# Patient Record
Sex: FEMALE | Race: WHITE | Employment: UNEMPLOYED | URBAN - METROPOLITAN AREA
[De-identification: names, ages, dates, MRNs, and addresses within clinical notes are randomized per-mention and may not be internally consistent; named-entity substitution may affect disease eponyms.]

---

## 2022-07-28 ENCOUNTER — APPOINTMENT (OUTPATIENT)
Dept: CT IMAGING | Age: 22
DRG: 134 | End: 2022-07-28
Payer: MEDICAID

## 2022-07-28 ENCOUNTER — HOSPITAL ENCOUNTER (INPATIENT)
Age: 22
LOS: 1 days | Discharge: HOME | DRG: 134 | End: 2022-07-28
Attending: EMERGENCY MEDICINE | Admitting: INTERNAL MEDICINE
Payer: MEDICAID

## 2022-07-28 ENCOUNTER — APPOINTMENT (OUTPATIENT)
Dept: GENERAL RADIOLOGY | Age: 22
DRG: 134 | End: 2022-07-28
Payer: MEDICAID

## 2022-07-28 VITALS
HEIGHT: 64 IN | HEART RATE: 102 BPM | OXYGEN SATURATION: 96 % | TEMPERATURE: 97.4 F | SYSTOLIC BLOOD PRESSURE: 114 MMHG | RESPIRATION RATE: 14 BRPM | DIASTOLIC BLOOD PRESSURE: 77 MMHG | WEIGHT: 136 LBS | BODY MASS INDEX: 23.22 KG/M2

## 2022-07-28 DIAGNOSIS — I26.99 ACUTE PULMONARY EMBOLISM, UNSPECIFIED PULMONARY EMBOLISM TYPE, UNSPECIFIED WHETHER ACUTE COR PULMONALE PRESENT (HCC): Primary | ICD-10-CM

## 2022-07-28 LAB
ABSOLUTE EOS #: 1.87 K/UL (ref 0–0.44)
ABSOLUTE IMMATURE GRANULOCYTE: 0.09 K/UL (ref 0–0.3)
ABSOLUTE LYMPH #: 2.38 K/UL (ref 1.2–5.2)
ABSOLUTE MONO #: 0.78 K/UL (ref 0.1–1.4)
ALBUMIN SERPL-MCNC: 3.9 G/DL (ref 3.5–5.2)
ALBUMIN/GLOBULIN RATIO: 1 (ref 1–2.5)
ALP BLD-CCNC: 93 U/L (ref 35–104)
ALT SERPL-CCNC: 18 U/L (ref 5–33)
ANION GAP SERPL CALCULATED.3IONS-SCNC: 8 MMOL/L (ref 9–17)
AST SERPL-CCNC: 22 U/L
BASOPHILS # BLD: 1 % (ref 0–2)
BASOPHILS ABSOLUTE: 0.07 K/UL (ref 0–0.2)
BILIRUB SERPL-MCNC: 0.2 MG/DL (ref 0.3–1.2)
BILIRUBIN URINE: NEGATIVE
BUN BLDV-MCNC: 11 MG/DL (ref 6–20)
CALCIUM SERPL-MCNC: 11 MG/DL (ref 8.6–10.4)
CHLORIDE BLD-SCNC: 102 MMOL/L (ref 98–107)
CO2: 30 MMOL/L (ref 20–31)
COLOR: YELLOW
COMMENT UA: NORMAL
CREAT SERPL-MCNC: 0.65 MG/DL (ref 0.5–0.9)
D-DIMER QUANTITATIVE: 0.51 MG/L FEU
EOSINOPHILS RELATIVE PERCENT: 13 % (ref 1–4)
GFR NON-AFRICAN AMERICAN: ABNORMAL ML/MIN
GFR SERPL CREATININE-BSD FRML MDRD: ABNORMAL ML/MIN/{1.73_M2}
GLUCOSE BLD-MCNC: 99 MG/DL (ref 70–99)
GLUCOSE URINE: NEGATIVE
HCG QUALITATIVE: NEGATIVE
HCT VFR BLD CALC: 38.3 % (ref 36.3–47.1)
HEMOGLOBIN: 11.9 G/DL (ref 11.9–15.1)
IMMATURE GRANULOCYTES: 1 %
KETONES, URINE: NEGATIVE
LEUKOCYTE ESTERASE, URINE: NEGATIVE
LIPASE: 18 U/L (ref 13–60)
LYMPHOCYTES # BLD: 17 % (ref 25–45)
MAGNESIUM: 1.9 MG/DL (ref 1.7–2.2)
MCH RBC QN AUTO: 26.7 PG (ref 25.2–33.5)
MCHC RBC AUTO-ENTMCNC: 31.1 G/DL (ref 28.4–34.8)
MCV RBC AUTO: 86.1 FL (ref 82.6–102.9)
MONOCYTES # BLD: 6 % (ref 2–8)
NITRITE, URINE: NEGATIVE
NRBC AUTOMATED: 0 PER 100 WBC
PDW BLD-RTO: 12.9 % (ref 11.8–14.4)
PH UA: 6.5 (ref 5–8)
PLATELET # BLD: 383 K/UL (ref 138–453)
PMV BLD AUTO: 9.3 FL (ref 8.1–13.5)
POTASSIUM SERPL-SCNC: 4 MMOL/L (ref 3.7–5.3)
PROTEIN UA: NEGATIVE
RBC # BLD: 4.45 M/UL (ref 3.95–5.11)
SEG NEUTROPHILS: 62 % (ref 34–64)
SEGMENTED NEUTROPHILS ABSOLUTE COUNT: 8.88 K/UL (ref 1.8–8)
SODIUM BLD-SCNC: 140 MMOL/L (ref 135–144)
SPECIFIC GRAVITY UA: 1.03 (ref 1–1.03)
TOTAL PROTEIN: 7.8 G/DL (ref 6.4–8.3)
TURBIDITY: CLEAR
URINE HGB: NEGATIVE
UROBILINOGEN, URINE: NORMAL
WBC # BLD: 14.1 K/UL (ref 4.5–13.5)

## 2022-07-28 PROCEDURE — 71260 CT THORAX DX C+: CPT | Performed by: STUDENT IN AN ORGANIZED HEALTH CARE EDUCATION/TRAINING PROGRAM

## 2022-07-28 PROCEDURE — 84703 CHORIONIC GONADOTROPIN ASSAY: CPT

## 2022-07-28 PROCEDURE — 6370000000 HC RX 637 (ALT 250 FOR IP): Performed by: STUDENT IN AN ORGANIZED HEALTH CARE EDUCATION/TRAINING PROGRAM

## 2022-07-28 PROCEDURE — 81003 URINALYSIS AUTO W/O SCOPE: CPT

## 2022-07-28 PROCEDURE — 99285 EMERGENCY DEPT VISIT HI MDM: CPT

## 2022-07-28 PROCEDURE — 83735 ASSAY OF MAGNESIUM: CPT

## 2022-07-28 PROCEDURE — 85025 COMPLETE CBC W/AUTO DIFF WBC: CPT

## 2022-07-28 PROCEDURE — 6360000004 HC RX CONTRAST MEDICATION: Performed by: STUDENT IN AN ORGANIZED HEALTH CARE EDUCATION/TRAINING PROGRAM

## 2022-07-28 PROCEDURE — 93005 ELECTROCARDIOGRAM TRACING: CPT | Performed by: STUDENT IN AN ORGANIZED HEALTH CARE EDUCATION/TRAINING PROGRAM

## 2022-07-28 PROCEDURE — 1200000000 HC SEMI PRIVATE

## 2022-07-28 PROCEDURE — 83690 ASSAY OF LIPASE: CPT

## 2022-07-28 PROCEDURE — 85379 FIBRIN DEGRADATION QUANT: CPT

## 2022-07-28 PROCEDURE — 71045 X-RAY EXAM CHEST 1 VIEW: CPT

## 2022-07-28 PROCEDURE — 2580000003 HC RX 258: Performed by: STUDENT IN AN ORGANIZED HEALTH CARE EDUCATION/TRAINING PROGRAM

## 2022-07-28 PROCEDURE — 80053 COMPREHEN METABOLIC PANEL: CPT

## 2022-07-28 RX ORDER — SODIUM CHLORIDE 0.9 % (FLUSH) 0.9 %
5-40 SYRINGE (ML) INJECTION PRN
Status: CANCELLED | OUTPATIENT
Start: 2022-07-28

## 2022-07-28 RX ORDER — SODIUM CHLORIDE 0.9 % (FLUSH) 0.9 %
5-40 SYRINGE (ML) INJECTION EVERY 12 HOURS SCHEDULED
Status: CANCELLED | OUTPATIENT
Start: 2022-07-28

## 2022-07-28 RX ORDER — POLYETHYLENE GLYCOL 3350 17 G/17G
17 POWDER, FOR SOLUTION ORAL DAILY PRN
Status: CANCELLED | OUTPATIENT
Start: 2022-07-28

## 2022-07-28 RX ORDER — ONDANSETRON 2 MG/ML
4 INJECTION INTRAMUSCULAR; INTRAVENOUS EVERY 6 HOURS PRN
Status: CANCELLED | OUTPATIENT
Start: 2022-07-28

## 2022-07-28 RX ORDER — ONDANSETRON 4 MG/1
4 TABLET, ORALLY DISINTEGRATING ORAL EVERY 8 HOURS PRN
Status: CANCELLED | OUTPATIENT
Start: 2022-07-28

## 2022-07-28 RX ORDER — ONDANSETRON 2 MG/ML
4 INJECTION INTRAMUSCULAR; INTRAVENOUS ONCE
Status: DISCONTINUED | OUTPATIENT
Start: 2022-07-28 | End: 2022-07-28 | Stop reason: HOSPADM

## 2022-07-28 RX ORDER — ACETAMINOPHEN 650 MG/1
650 SUPPOSITORY RECTAL EVERY 6 HOURS PRN
Status: CANCELLED | OUTPATIENT
Start: 2022-07-28

## 2022-07-28 RX ORDER — ACETAMINOPHEN 325 MG/1
650 TABLET ORAL EVERY 6 HOURS PRN
Status: CANCELLED | OUTPATIENT
Start: 2022-07-28

## 2022-07-28 RX ORDER — 0.9 % SODIUM CHLORIDE 0.9 %
1000 INTRAVENOUS SOLUTION INTRAVENOUS ONCE
Status: COMPLETED | OUTPATIENT
Start: 2022-07-28 | End: 2022-07-28

## 2022-07-28 RX ORDER — SODIUM CHLORIDE 9 MG/ML
INJECTION, SOLUTION INTRAVENOUS PRN
Status: CANCELLED | OUTPATIENT
Start: 2022-07-28

## 2022-07-28 RX ADMIN — APIXABAN 5 MG: 5 TABLET, FILM COATED ORAL at 18:13

## 2022-07-28 RX ADMIN — SODIUM CHLORIDE 1000 ML: 9 INJECTION, SOLUTION INTRAVENOUS at 14:47

## 2022-07-28 RX ADMIN — IOPAMIDOL 75 ML: 755 INJECTION, SOLUTION INTRAVENOUS at 15:26

## 2022-07-28 ASSESSMENT — ENCOUNTER SYMPTOMS
CHOKING: 0
VOMITING: 1
ABDOMINAL PAIN: 1
COUGH: 0
SHORTNESS OF BREATH: 1
NAUSEA: 0
BACK PAIN: 0

## 2022-07-28 NOTE — ED NOTES
Nurse confirmed with Dr Arvind Esqueda that patient is to be discharged and she confirmed yes that she perfect served the team and informed them. Nurse to room to inform patient of change and give discharge papers and first dose of elaquis. Patient given RX and assistance card for elaquis. IV removed. No questions at this time.      Zari Knapp LPN  19/37/83 2026

## 2022-07-28 NOTE — ED NOTES
Patient in need of a ride home from the ER. Patient is new to the area and is staying at [facility]. Patient unsure how to get back there. Patient has only [place] Medicaid in place and has not signed up for [employer] [facility] yet, instructions provided to do that. Patient told SW would set up a one-time cab trip for patient to get back to the shelter. [employer] arranged. Celestine Leal.  Edin Reynolds, [place]  07/28/22 2000

## 2022-07-28 NOTE — ED NOTES
Resident asked for Eliquis assistance. SW informed resident that she has [facility] and that they should be able to cover it. She then asked for any other assistance in case they did not. Resident was provided with the free 30 day trial offer for Eliquis.        Deep LANDRY, [address] [place] [address]  07/28/22 4660

## 2022-07-28 NOTE — DISCHARGE INSTRUCTIONS
You will be started on Eliquis because of your pulmonary embolus. Make sure he follow-up with vascular surgeon due to pulmonary embolism. If it anytime you have shortness of breath please make sure him back to the emergency room as soon as possible. Please make sure you take your medications as prescribed.     CT CHEST PULMONARY EMBOLISM W CONTRAST   Final Result   Couple of small nonocclusive intraluminal filling defects involving the   subsegmental branches to the left lower lobe arteries suggesting small   pulmonary emboli      Critical results were attempted and called by Dr. Samira Redd MD   however connection could not be made therefore results were given to  the   Radiology Results [address] and conveyed to Patti Monroy MD   on 7/28/2022 at 16:16,         XR CHEST PORTABLE   Final Result   No acute cardiopulmonary findings

## 2022-07-28 NOTE — ED PROVIDER NOTES
HPI, Physical Exam and Medical Decision Making performed by medical students or scribes is based on my personal performance of the HPI, PE and MDM. For Phys Assistant/ Nurse Practitioner cases/documentation I have had a face to face evaluation of this patient and have completed at least one if not all key elements of the E/M (history, physical exam, and MDM). Additional findings are as noted. For APC cases I have personally evaluated and examined the patient in conjunction with the APC and agree with the treatment plan and disposition of the patient as recorded by the APC.     Ceasar Saldaña MD  Attending Emergency  Physician       Guanako Martin MD  07/28/22 4836       Guanako Martin MD  07/28/22 2011

## 2022-07-28 NOTE — ED PROVIDER NOTES
[facility]  ED  Emergency Department Encounter  Emergency Medicine Resident     Pt Name: Court Orlando  MRN: [id number]  [place] 2000  Date of evaluation: 7/28/22  PCP:  No primary care provider on file. CHIEF COMPLAINT       Chief Complaint   Patient presents with    Chest Pain    Other     Possible pregnancy? ?    Stye     R eye       HISTORY OFPRESENT ILLNESS  (Location/Symptom, Timing/Onset, Context/Setting, Quality, Duration, Modifying Factors,Severity.)      Court Orlando is a 25 y. o.yo female who presents with sudden onset chest pain that started when she woke up this morning. Patient states that there is mildly worsened with inspiration. Patient went to urgent care today and was found to be tachycardic and thus was sent to the emergency room for evaluation. Patient reports that she is never experienced any pain like this before, she states that she is not aware if she is on any OCP, no recent long distance traveling, no recent surgery. Patient denies any fever, chills. Patient also reports that she took multiple pregnancy test, 1 came back positive but the other Dorsie Areola came back negative and unsure if she is pregnant. She is complaining of epigastric pain with mild nausea. Denies any vaginal discharge, vaginal bleeding, urinary dysuria and frequency. PAST MEDICAL / SURGICAL / SOCIAL / FAMILY HISTORY      has no past medical history on file. has no past surgical history on file.      Social History     Socioeconomic History    Marital status: Single     Spouse name: Not on file    Number of children: Not on file    Years of education: Not on file    Highest education level: Not on file   Occupational History    Not on file   Tobacco Use    Smoking status: Never    Smokeless tobacco: Never   Substance and Sexual Activity    Alcohol use: Not Currently    Drug use: Not Currently    Sexual activity: Not on file   Other Topics Concern    Not on file   Social History Narrative Not on file     Social Determinants of Health     Financial Resource Strain: Not on file   Food Insecurity: Not on file   Transportation Needs: Not on file   Physical Activity: Not on file   Stress: Not on file   Social Connections: Not on file   Intimate Partner Violence: Not on file   Housing Stability: Not on file       History reviewed. No pertinent family history. Allergies:  Penicillins    Home Medications:  Prior to Admission medications    Medication Sig Start Date End Date Taking? Authorizing Provider   apixaban starter pack (ELIQUIS DVT/PE STARTER PACK) 5 MG TBPK tablet Take 1 tablet by mouth See Admin Instructions 7/28/22  Yes Tj Vizcaino MD       REVIEW OFSYSTEMS    (2-9 systems for level 4, 10 or more for level 5)      Review of Systems   Constitutional:  Positive for chills. Negative for fatigue and fever. HENT:  Negative for congestion. Respiratory:  Positive for shortness of breath. Negative for cough and choking. Cardiovascular:  Positive for chest pain. Gastrointestinal:  Positive for abdominal pain and vomiting. Negative for nausea. Genitourinary:  Negative for decreased urine volume, dysuria, frequency, hematuria, vaginal bleeding, vaginal discharge and vaginal pain. Musculoskeletal:  Negative for back pain and gait problem. Skin:  Negative for rash and wound. Psychiatric/Behavioral:  Negative for behavioral problems and confusion. PHYSICAL EXAM   (up to 7 for level 4, 8 or more forlevel 5)      INITIAL VITALS:   ED Triage Vitals [07/28/22 1227]   BP Temp Temp Source Heart Rate Resp SpO2 Height Weight   124/78 97.9 °F (36.6 °C) Oral (!) 112 12 96 % 5' 4\" (1.626 m) 136 lb (61.7 kg)       Physical Exam  Constitutional:       Appearance: Normal appearance. HENT:      Head: Normocephalic and atraumatic. Nose: Nose normal.      Mouth/Throat:      Mouth: Mucous membranes are moist.   Eyes:      Extraocular Movements: Extraocular movements intact. Pupils: Pupils are equal, round, and reactive to light. Cardiovascular:      Rate and Rhythm: Tachycardia present. Pulses: Normal pulses. Heart sounds: No murmur heard. Pulmonary:      Effort: Pulmonary effort is normal. No respiratory distress. Abdominal:      General: There is no distension. Tenderness: There is no abdominal tenderness. Musculoskeletal:         General: No swelling or tenderness. Cervical back: Normal range of motion. No rigidity. Skin:     General: Skin is warm. Coloration: Skin is not jaundiced. Neurological:      General: No focal deficit present. Mental Status: She is alert. Psychiatric:         Mood and Affect: Mood normal.       DIFFERENTIAL  DIAGNOSIS     PLAN (LABS / IMAGING / EKG):  Orders Placed This Encounter   Procedures    XR CHEST PORTABLE    CT CHEST PULMONARY EMBOLISM W CONTRAST    Comprehensive Metabolic Panel    Lipase    Magnesium    CBC with Auto Differential    HCG Qualitative, Serum    D-Dimer, Quantitative    Urinalysis with Reflex to Culture    Cardiac Monitor    Pulse Oximetry    Inpatient consult to Vascular Surgery    Inpatient consult to Internal Medicine    EKG 12 Lead    Saline lock IV    ADMIT TO INPATIENT       MEDICATIONS ORDERED:  Orders Placed This Encounter   Medications    0.9 % sodium chloride bolus    DISCONTD: ondansetron (ZOFRAN) injection 4 mg    iopamidol (ISOVUE-370) 76 % injection 75 mL    apixaban starter pack (ELIQUIS DVT/PE STARTER PACK) 5 MG TBPK tablet     Sig: Take 1 tablet by mouth See Admin Instructions     Dispense:  74 tablet     Refill:  0    apixaban (ELIQUIS) tablet 5 mg     Order Specific Question:   Indication of Use     Answer:   Treatment-DVT/PE         Initial MDM/Plan: 25 y.o. female who presents with chest pain and abdominal pain. Patient tachycardic in the emergency room of 123. Not hypoxic, stable vitals.   EKG done did show S1Q3 pattern but no T3 pattern, she is tachycardic of Condition (MA) Reason for Exam: chest pain with elevated d dimer FINDINGS: Pulmonary Arteries: Pulmonary arteries are adequately opacified for evaluation. There are a couple of small nonocclusive intraluminal filling defects involving the subsegmental branches to the left lower lobe. No other significant intraluminal filling defects are seen. Main pulmonary artery is normal in caliber. Mediastinum: No evidence of mediastinal lymphadenopathy. The heart and pericardium demonstrate no acute abnormality. There is no acute abnormality of the thoracic aorta. Lungs/pleura: The lungs are without acute process. No focal consolidation or pulmonary edema. No evidence of pleural effusion or pneumothorax. Upper Abdomen: Limited images of the upper abdomen are unremarkable. Soft Tissues/Bones: No acute bone or soft tissue abnormality.      Couple of small nonocclusive intraluminal filling defects involving the subsegmental branches to the left lower lobe arteries suggesting small pulmonary emboli Critical results were attempted and called by Dr. Jason Harris MD however connection could not be made therefore results were given to  the Radiology Results [address] and conveyed to Patti Monroy MD on 7/28/2022 at 16:16,        EKG  EKG Interpretation    Interpreted by me    Rhythm: normal sinus   Rate: normal  Axis: normal  Ectopy: none  Conduction: normal  ST Segments: no acute change  T Waves: no acute change  Q Waves: none    Clinical Impression: Presence of S1, Q3 no T3    All EKG's are interpreted by the Emergency Department Physicianwho either signs or Co-signs this chart in the absence of a cardiologist.    EMERGENCY DEPARTMENT COURSE:  ED Course as of 07/28/22 2130   Thu Jul 28, 2022   1544 Elevation her dimer was 0.51, will get a CT chest to rule out pulmonary embolism [AN]   1727 Vascular surgery evaluated due to the small pulmonary embolism seen on CT scan, they recommended outpatient treatment with Eliquis or Xarelto. Plan to start patient on Eliquis. Plan for discharge. Patient to follow-up with Dr. Mahsa Jones as outpatient. Patient was updated on results and the plan [AN]      ED Course User Index  [AN] Sam Pate MD          PROCEDURES:  None    CONSULTS:  IP CONSULT TO VASCULAR SURGERY  IP CONSULT TO INTERNAL MEDICINE    CRITICAL CARE:      FINAL IMPRESSION      1.  Acute pulmonary embolism, unspecified pulmonary embolism type, unspecified whether acute cor pulmonale present [facility])          DISPOSITION / PLAN     DISPOSITION Decision To Discharge 07/28/2022 05:54:30 PM      PATIENT REFERRED TO:  [facility] ED  [address]        Saman Giang MD  [facility] [address]  [phone]    In 4 weeks  F/U for Pulmonary Embolism    DISCHARGE MEDICATIONS:  Discharge Medication List as of 7/28/2022  6:00 PM        START taking these medications    Details   apixaban starter pack (ELIQUIS DVT/PE STARTER PACK) 5 MG TBPK tablet Take 1 tablet by mouth See Admin Instructions, Disp-74 tablet, R-0Print             Sam Pate MD  Emergency Medicine Resident    (Please note that portions of this note were completed with a voice recognition program.Efforts were made to edit the dictations but occasionally words are mis-transcribed.)        Sam Pate MD  Resident  07/28/22 6396

## 2022-07-28 NOTE — CONSULTS
bruising  Endocrine ROS: negative for - malaise/lethargy, mood swings or palpitations  Respiratory ROS: negative for - cough, hemoptysis or shortness of breath  Cardiovascular ROS: Positive for chest pain with deep breaths  Gastrointestinal ROS: no abdominal pain, constipation, hematochezia  Genito-Urinary ROS: no dysuria, trouble voiding, or hematuria  Musculoskeletal ROS: negative for - joint pain, joint swelling or muscle pain  Neurological ROS:  TIA or stroke like symptom  Dermatological ROS: negative for dry skin and eczema      PHYSICAL EXAM:    VITALS:  /77   Pulse (!) 102   Temp 97.4 °F (36.3 °C)   Resp 14   Ht 5' 4\" (1.626 m)   Wt 136 lb (61.7 kg)   SpO2 96%   BMI 23.34 kg/m²   INTAKE/OUTPUT:   No intake or output data in the 24 hours ending 07/28/22 1723      CONSTITUTIONAL:  Alert and oriented, no acute distress  HEAD: normocephalic, atraumatic  EYES: Pupils equal and reactive to light, Extraocular muscles intact, sclera non icteric  ENT: Mucus membranes moist, No otorrhea, no rhinorrhea  NECK:  supple, symmetrical, trachea midline   LUNGS:  Good air movement bilaterally, unlabored respirations, on room air  CARDIOVASCULAR: Regular rate and rhythm  ABDOMEN: soft, non tender, non distended  : Deferred  Rectal:  Deferred  MUSCULOSKELETAL:  Equal strength bilaterally, normal muscle tone, mild tenderness to right calf  SKIN: No abscess or rash, punctate lesions appeared to look like bug bites on bilateral lower extremities  Ext: Palpable PT/DP pulses, no significant swelling to bilateral lower extremities  NEUROLOGIC:  Cranial nerves 2-12 grossly intact, no focal deficits  PSYCH: affect appropriate      Labs:   Lab Results   Component Value Date/Time    WBC 14.1 07/28/2022 01:23 PM    HGB 11.9 07/28/2022 01:23 PM    HCT 38.3 07/28/2022 01:23 PM    MCV 86.1 07/28/2022 01:23 PM     07/28/2022 01:23 PM     Lab Results   Component Value Date/Time     07/28/2022 01:23 PM    K 4.0

## 2022-07-29 LAB
EKG ATRIAL RATE: 110 BPM
EKG P AXIS: 61 DEGREES
EKG P-R INTERVAL: 140 MS
EKG Q-T INTERVAL: 316 MS
EKG QRS DURATION: 70 MS
EKG QTC CALCULATION (BAZETT): 427 MS
EKG R AXIS: 78 DEGREES
EKG T AXIS: 49 DEGREES
EKG VENTRICULAR RATE: 110 BPM

## 2022-07-29 PROCEDURE — 93010 ELECTROCARDIOGRAM REPORT: CPT | Performed by: INTERNAL MEDICINE

## 2022-08-09 ENCOUNTER — HOSPITAL ENCOUNTER (EMERGENCY)
Age: 22
Discharge: HOME | End: 2022-08-10
Attending: EMERGENCY MEDICINE

## 2022-08-09 DIAGNOSIS — R07.9 CHEST PAIN, UNSPECIFIED TYPE: Primary | ICD-10-CM

## 2022-08-09 PROCEDURE — 36415 COLL VENOUS BLD VENIPUNCTURE: CPT

## 2022-08-09 PROCEDURE — 93005 ELECTROCARDIOGRAM TRACING: CPT | Performed by: EMERGENCY MEDICINE

## 2022-08-09 PROCEDURE — 99285 EMERGENCY DEPT VISIT HI MDM: CPT

## 2022-08-10 ENCOUNTER — APPOINTMENT (OUTPATIENT)
Dept: GENERAL RADIOLOGY | Age: 22
End: 2022-08-10
Attending: EMERGENCY MEDICINE

## 2022-08-10 VITALS
WEIGHT: 136.02 LBS | DIASTOLIC BLOOD PRESSURE: 75 MMHG | OXYGEN SATURATION: 97 % | RESPIRATION RATE: 16 BRPM | TEMPERATURE: 98.3 F | SYSTOLIC BLOOD PRESSURE: 121 MMHG | HEART RATE: 98 BPM

## 2022-08-10 LAB
ALBUMIN SERPL-MCNC: 3.1 G/DL (ref 3.6–5.1)
ALBUMIN/GLOB SERPL: 0.7 {RATIO} (ref 1–2.4)
ALP SERPL-CCNC: 72 UNITS/L (ref 42–110)
ALT SERPL-CCNC: 29 UNITS/L
ANION GAP SERPL CALC-SCNC: 12 MMOL/L (ref 7–19)
AST SERPL-CCNC: 25 UNITS/L
BASOPHILS # BLD: 0 K/MCL (ref 0–0.3)
BASOPHILS NFR BLD: 0 %
BILIRUB SERPL-MCNC: 0.1 MG/DL (ref 0.2–1)
BUN SERPL-MCNC: 14 MG/DL (ref 6–20)
BUN/CREAT SERPL: 23 (ref 7–25)
CALCIUM SERPL-MCNC: 10.2 MG/DL (ref 8.4–10.2)
CHLORIDE SERPL-SCNC: 103 MMOL/L (ref 97–110)
CO2 SERPL-SCNC: 27 MMOL/L (ref 21–32)
CREAT SERPL-MCNC: 0.62 MG/DL (ref 0.51–0.95)
DEPRECATED RDW RBC: 39.5 FL (ref 39–50)
EOSINOPHIL # BLD: 1.4 K/MCL (ref 0–0.5)
EOSINOPHIL NFR BLD: 11 %
ERYTHROCYTE [DISTWIDTH] IN BLOOD: 13.2 % (ref 11–15)
FASTING DURATION TIME PATIENT: ABNORMAL H
GFR SERPLBLD BASED ON 1.73 SQ M-ARVRAT: >90 ML/MIN
GLOBULIN SER-MCNC: 4.6 G/DL (ref 2–4)
GLUCOSE SERPL-MCNC: 117 MG/DL (ref 70–99)
HCT VFR BLD CALC: 32.7 % (ref 36–46.5)
HGB BLD-MCNC: 10.4 G/DL (ref 12–15.5)
IMM GRANULOCYTES # BLD AUTO: 0.1 K/MCL (ref 0–0.2)
IMM GRANULOCYTES # BLD: 1 %
LIPASE SERPL-CCNC: 93 UNITS/L (ref 73–393)
LYMPHOCYTES # BLD: 3.1 K/MCL (ref 1.2–5.2)
LYMPHOCYTES NFR BLD: 24 %
MCH RBC QN AUTO: 26.5 PG (ref 26–34)
MCHC RBC AUTO-ENTMCNC: 31.8 G/DL (ref 32–36.5)
MCV RBC AUTO: 83.4 FL (ref 78–100)
MONOCYTES # BLD: 0.9 K/MCL (ref 0.3–0.9)
MONOCYTES NFR BLD: 7 %
NEUTROPHILS # BLD: 7.6 K/MCL (ref 1.8–8)
NEUTROPHILS NFR BLD: 57 %
NRBC BLD MANUAL-RTO: 0 /100 WBC
P AXIS (DEGREES): 55
P AXIS (DEGREES): 59
PLATELET # BLD AUTO: 376 K/MCL (ref 140–450)
POTASSIUM SERPL-SCNC: 4 MMOL/L (ref 3.4–5.1)
PR-INTERVAL (MSEC): 163
PR-INTERVAL (MSEC): 164
PROT SERPL-MCNC: 7.7 G/DL (ref 6.4–8.2)
QRS-INTERVAL (MSEC): 77
QRS-INTERVAL (MSEC): 77
QT-INTERVAL (MSEC): 338
QT-INTERVAL (MSEC): 341
QTC: 374
QTC: 380
R AXIS (DEGREES): 69
R AXIS (DEGREES): 70
RBC # BLD: 3.92 MIL/MCL (ref 4–5.2)
REPORT TEXT: NORMAL
REPORT TEXT: NORMAL
SODIUM SERPL-SCNC: 138 MMOL/L (ref 135–145)
T AXIS (DEGREES): 35
T AXIS (DEGREES): 35
TROPONIN I SERPL DL<=0.01 NG/ML-MCNC: <4 NG/L
VENTRICULAR RATE EKG/MIN (BPM): 80
VENTRICULAR RATE EKG/MIN (BPM): 83
WBC # BLD: 13.1 K/MCL (ref 4.2–11)

## 2022-08-10 PROCEDURE — 83690 ASSAY OF LIPASE: CPT | Performed by: EMERGENCY MEDICINE

## 2022-08-10 PROCEDURE — 84484 ASSAY OF TROPONIN QUANT: CPT | Performed by: EMERGENCY MEDICINE

## 2022-08-10 PROCEDURE — 80053 COMPREHEN METABOLIC PANEL: CPT | Performed by: EMERGENCY MEDICINE

## 2022-08-10 PROCEDURE — 85025 COMPLETE CBC W/AUTO DIFF WBC: CPT | Performed by: EMERGENCY MEDICINE

## 2022-08-10 PROCEDURE — 93005 ELECTROCARDIOGRAM TRACING: CPT | Performed by: EMERGENCY MEDICINE

## 2022-08-10 PROCEDURE — 71045 X-RAY EXAM CHEST 1 VIEW: CPT

## 2022-08-10 RX ORDER — 0.9 % SODIUM CHLORIDE 0.9 %
2 VIAL (ML) INJECTION EVERY 12 HOURS SCHEDULED
Status: DISCONTINUED | OUTPATIENT
Start: 2022-08-10 | End: 2022-08-10 | Stop reason: HOSPADM

## 2022-08-10 ASSESSMENT — HEART SCORE
HEART SCORE: 0
RISK FACTORS: NO RISK FACTORS KNOWN
TROPONIN: EQUAL OR LESS THAN NORMAL LIMIT
EKG: NORMAL
AGE: LESS THAN OR EQUAL TO 45
HISTORY: SLIGHTLY SUSPICIOUS

## 2022-08-10 ASSESSMENT — ENCOUNTER SYMPTOMS
HEADACHES: 0
ABDOMINAL PAIN: 0
SHORTNESS OF BREATH: 1
FEVER: 0
CONFUSION: 0

## 2022-08-10 ASSESSMENT — PAIN SCALES - GENERAL: PAINLEVEL_OUTOF10: 9

## 2022-09-04 ENCOUNTER — HOSPITAL ENCOUNTER (EMERGENCY)
Age: 22
Discharge: HOME | End: 2022-09-04
Attending: EMERGENCY MEDICINE
Payer: MEDICAID

## 2022-09-04 VITALS
DIASTOLIC BLOOD PRESSURE: 75 MMHG | TEMPERATURE: 98.4 F | RESPIRATION RATE: 17 BRPM | BODY MASS INDEX: 22.2 KG/M2 | HEIGHT: 64 IN | SYSTOLIC BLOOD PRESSURE: 124 MMHG | OXYGEN SATURATION: 100 % | HEART RATE: 67 BPM | WEIGHT: 130 LBS

## 2022-09-04 DIAGNOSIS — Z00.00 NORMAL EXAM: Primary | ICD-10-CM

## 2022-09-04 LAB — HCG(URINE) PREGNANCY TEST: NEGATIVE

## 2022-09-04 PROCEDURE — 81025 URINE PREGNANCY TEST: CPT

## 2022-09-04 PROCEDURE — 6370000000 HC RX 637 (ALT 250 FOR IP): Performed by: EMERGENCY MEDICINE

## 2022-09-04 PROCEDURE — 99283 EMERGENCY DEPT VISIT LOW MDM: CPT

## 2022-09-04 RX ORDER — IBUPROFEN 200 MG
400 TABLET ORAL ONCE
Status: COMPLETED | OUTPATIENT
Start: 2022-09-04 | End: 2022-09-04

## 2022-09-04 RX ADMIN — IBUPROFEN 400 MG: 200 TABLET, FILM COATED ORAL at 17:59

## 2022-09-04 NOTE — ED PROVIDER NOTES
[id number] [facility] ED  [address] [place] [facility] [address]  Phone: [phone]  Fax: [phone]      Pt Name: Gina JONES:[id number]  [place] 2000  Date of evaluation: 9/4/2022      CHIEF COMPLAINT       Chief Complaint   Patient presents with    Fall       HISTORY OF PRESENT ILLNESS   68-year-old female presents to the emergency department today after she had a ground-level fall. She states that she fell but does not know why. She is on blood thinners. She denies striking her head. She states that she hurts everywhere and is unable to identify specifically where she hurts. She presents here by EMS on full Bordon collar. She denies loss conscious. No nausea vomiting. No distal deficits. There is been no other contemporaneous evaluation or management of the symptoms prior arrival.  Patient does report there is a very strong chance that she could be pregnant. REVIEWOF SYSTEMS     Review of Systems   All other systems reviewed and are negative. PAST MEDICAL HISTORY    has no past medical history on file. SURGICAL HISTORY      has no past surgical history on file. [address]       Current Discharge Medication List        CONTINUE these medications which have NOT CHANGED    Details   apixaban starter pack (ELIQUIS DVT/PE STARTER PACK) 5 MG TBPK tablet Take 1 tablet by mouth See Admin Instructions  Qty: 74 tablet, Refills: 0             ALLERGIES     is allergic to penicillins. FAMILY HISTORY     has no family status information on file. family history is not on file. SOCIAL HISTORY      reports that she has never smoked. She has never used smokeless tobacco. She reports that she does not currently use alcohol. She reports that she does not currently use drugs. PHYSICAL EXAM     INITIAL VITALS:  height is 5' 4\" (1.626 m) and weight is 59 kg (130 lb). Her oral temperature is 98.4 °F (36.9 °C). Her blood pressure is 124/75 and her pulse is 67.  Her respiration is 17 and oxygen saturation is 100%. Physical Exam  Vitals reviewed. Constitutional:       General: She is not in acute distress. Appearance: She is well-developed. HENT:      Head: Normocephalic and atraumatic. Eyes:      Conjunctiva/sclera: Conjunctivae normal.      Pupils: Pupils are equal, round, and reactive to light. Neck:      Trachea: No tracheal deviation. Cardiovascular:      Rate and Rhythm: Normal rate and regular rhythm. Pulmonary:      Effort: No respiratory distress. Breath sounds: Normal breath sounds. Abdominal:      General: Bowel sounds are normal. There is no distension. Palpations: Abdomen is soft. Tenderness: no abdominal tenderness   Musculoskeletal:         General: No tenderness. Normal range of motion. Cervical back: Normal range of motion and neck supple. Skin:     General: Skin is warm and dry. Neurological:      Mental Status: She is alert and oriented to person, place, and time. Psychiatric:         Behavior: Behavior normal.         Thought Content: Thought content normal.         Judgment: Judgment normal.         MDM:   I have been able to clear this patient from cervical collar. There is no objective evidence of trauma. I will treat her pain with ibuprofen but saying that there is chance that she could be pregnant, I have ordered a urine pregnancy. 6:25 PM EDT  Patient remains neurologically intact. She ambulates here without difficulty. Supportive care instructions have been given and she will be discharged home. The patient was given the following medications:  Orders Placed This Encounter   Medications    ibuprofen (ADVIL;MOTRIN) tablet 400 mg          FINAL IMPRESSION      1.  Normal exam          DISPOSITION/PLAN   DISPOSITION Decision To Discharge 09/04/2022 06:26:03 PM      Condition on Disposition  Good    PATIENT REFERRED TO:  Your personal physician    Schedule an appointment as soon as possible for a visit in 2 days      DISCHARGE MEDICATIONS:  Current Discharge Medication List          (Please note that portions of this note werecompleted with a voice recognition program.  Efforts were made to edit the dictations but occasionally words are mis-transcribed.)    Lev Dawson MD MD, F.A.C.E.P, F.A.A.E.M  Emergency Physician Attending         Lev Dawson MD  09/04/22 7975

## 2022-09-07 ENCOUNTER — HOSPITAL ENCOUNTER (EMERGENCY)
Age: 22
Discharge: HOME | End: 2022-09-08
Attending: EMERGENCY MEDICINE
Payer: MEDICAID

## 2022-09-07 ENCOUNTER — APPOINTMENT (OUTPATIENT)
Dept: GENERAL RADIOLOGY | Age: 22
End: 2022-09-07
Payer: MEDICAID

## 2022-09-07 VITALS
HEART RATE: 95 BPM | DIASTOLIC BLOOD PRESSURE: 80 MMHG | OXYGEN SATURATION: 98 % | WEIGHT: 135 LBS | SYSTOLIC BLOOD PRESSURE: 130 MMHG | RESPIRATION RATE: 14 BRPM | BODY MASS INDEX: 23.17 KG/M2 | TEMPERATURE: 98.7 F

## 2022-09-07 DIAGNOSIS — S82.892A CLOSED FRACTURE OF LEFT ANKLE, INITIAL ENCOUNTER: Primary | ICD-10-CM

## 2022-09-07 PROCEDURE — 6370000000 HC RX 637 (ALT 250 FOR IP): Performed by: EMERGENCY MEDICINE

## 2022-09-07 PROCEDURE — 99283 EMERGENCY DEPT VISIT LOW MDM: CPT

## 2022-09-07 PROCEDURE — 73610 X-RAY EXAM OF ANKLE: CPT

## 2022-09-07 PROCEDURE — 73562 X-RAY EXAM OF KNEE 3: CPT

## 2022-09-07 RX ORDER — IBUPROFEN 800 MG/1
800 TABLET ORAL ONCE
Status: COMPLETED | OUTPATIENT
Start: 2022-09-07 | End: 2022-09-07

## 2022-09-07 RX ADMIN — IBUPROFEN 800 MG: 800 TABLET ORAL at 22:43

## 2022-09-07 ASSESSMENT — PAIN DESCRIPTION - PAIN TYPE: TYPE: ACUTE PAIN

## 2022-09-07 ASSESSMENT — PAIN SCALES - GENERAL
PAINLEVEL_OUTOF10: 8
PAINLEVEL_OUTOF10: 8

## 2022-09-07 ASSESSMENT — PAIN DESCRIPTION - LOCATION: LOCATION: ANKLE

## 2022-09-07 ASSESSMENT — PAIN DESCRIPTION - ORIENTATION: ORIENTATION: LEFT

## 2022-09-07 ASSESSMENT — PAIN - FUNCTIONAL ASSESSMENT: PAIN_FUNCTIONAL_ASSESSMENT: 0-10
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
## 2022-09-08 NOTE — ED PROVIDER NOTES
[address] [facility] ED  EMERGENCY DEPARTMENT ENCOUNTER      Pt Name: Penelope Reynolds  MRN: [id number]  [place] 2000  Date of evaluation: 9/7/2022  Provider: Tom Sutton MD    CHIEF COMPLAINT       Chief Complaint   Patient presents with    Ankle Pain     left       HISTORY OF PRESENT ILLNESS  (Location/Symptom, Timing/Onset, Context/Setting, Quality, Duration, Modifying Factors, Severity.)   Penelope Reynolds is a 25 y.o. female who presents to the emergency department complaining of left ankle pain and left knee pain. She relates the whole leg between the ankle and knee is hurting after a fall today. She feels like she stepped in a hole and twisted her ankle. She denies falling otherwise. Nursing Notes were reviewed. REVIEW OF SYSTEMS    (2-9 systems for level 4, 10 or more for level 5)     Review of Systems   Musculoskeletal:         Left ankle and knee pain after a fall   All other systems reviewed and are negative. Except as noted above the remainder of the review of systems was reviewed and negative. PAST MEDICAL HISTORY   No past medical history on file. SURGICAL HISTORY     No past surgical history on file. CURRENT MEDICATIONS       Discharge Medication List as of 9/8/2022 12:04 AM        CONTINUE these medications which have NOT CHANGED    Details   apixaban starter pack (ELIQUIS DVT/PE STARTER PACK) 5 MG TBPK tablet Take 1 tablet by mouth See Admin Instructions, Disp-74 tablet, R-0Print             ALLERGIES     Penicillins    FAMILY HISTORY     No family history on file. No family status information on file. SOCIAL HISTORY      reports that she has never smoked. She has never used smokeless tobacco. She reports that she does not currently use alcohol. She reports that she does not currently use drugs.     PHYSICAL EXAM    (up to 7 for level 4, 8 or more for level 5)     ED Triage Vitals [09/07/22 2229]   BP Temp Temp Source Heart Rate Resp SpO2 Height Weight 130/80 -- Oral 95 14 98 % -- 135 lb (61.2 kg)     Physical Exam  Vitals and nursing note reviewed. Constitutional:       General: She is not in acute distress. Appearance: She is well-developed. She is not ill-appearing, toxic-appearing or diaphoretic. HENT:      Head: Normocephalic and atraumatic. Right Ear: External ear normal.      Left Ear: External ear normal.      Nose: Nose normal.      Mouth/Throat:      Mouth: Mucous membranes are moist.   Eyes:      General:         Right eye: No discharge. Left eye: No discharge. Conjunctiva/sclera: Conjunctivae normal.      Pupils: Pupils are equal, round, and reactive to light. Cardiovascular:      Rate and Rhythm: Normal rate and regular rhythm. Heart sounds: Normal heart sounds. No murmur heard. Pulmonary:      Effort: Pulmonary effort is normal. No respiratory distress. Breath sounds: Normal breath sounds. No wheezing or rales. Abdominal:      General: Bowel sounds are normal. There is no distension. Palpations: Abdomen is soft. There is no mass. Tenderness: There is no abdominal tenderness. There is no guarding or rebound. Musculoskeletal:         General: Swelling, tenderness and signs of injury present. No deformity. Normal range of motion. Cervical back: Normal range of motion and neck supple. Right lower leg: No edema. Left lower leg: No edema. Comments: Left ankle pain   Lymphadenopathy:      Cervical: No cervical adenopathy. Skin:     General: Skin is warm. Findings: No rash. Neurological:      General: No focal deficit present. Mental Status: She is alert and oriented to person, place, and time. Motor: No abnormal muscle tone.    Psychiatric:         Mood and Affect: Mood normal.         Behavior: Behavior normal.        DIAGNOSTIC RESULTS     EKG: All EKG's are interpreted by the Emergency Department Physician who either signs or Co-signs this chart in the absence of a cardiologist.    none    RADIOLOGY:   Non-plain film images such as CT, Ultrasound and MRI are read by the radiologist.   Interpretation per the Radiologist below, if available at the time of this note:    XR ANKLE LEFT (MIN 3 VIEWS)   Final Result   Punctate calcific density between the tip of the lateral malleolus and   lateral process of the talus could represent an acute avulsion fracture if   the patient has focal correlative symptoms. No definite donor site is   identified. XR KNEE LEFT (3 VIEWS)   Final Result   No acute osseous abnormality. ED BEDSIDE ULTRASOUND:   Performed by ED Physician - none    LABS:  Labs Reviewed - No data to display    All other labs were within normal range or not returned as of this dictation. EMERGENCY DEPARTMENT COURSE and DIFFERENTIAL DIAGNOSIS/MDM:   Vitals:    Vitals:    09/07/22 2229   BP: 130/80   Pulse: 95   Resp: 14   Temp: 98.7 °F (37.1 °C)   TempSrc: Oral   SpO2: 98%   Weight: 61.2 kg (135 lb)   Patient is comfortable with xray. Concern for fracture versus strain. I did go over xrays and we will place a boot for possible avulsion fracture and have her f/u with ortho. CONSULTS:  None    PROCEDURES:  Ortho boot placed by RN for possible fracture. Pt tolerates well. PMS intact following procedure. Crutch training provided by RN. Patient will follow-up with orthopedic surgery for definitive care. FINAL IMPRESSION      1. Closed fracture of left ankle, initial encounter          DISPOSITION/PLAN   DISPOSITION Decision To Discharge 09/08/2022 12:03:05 AM      PATIENT REFERRED TO:  No follow-up provider specified.     DISCHARGE MEDICATIONS:  Discharge Medication List as of 9/8/2022 12:04 AM          (Please note that portions of this note were completed with a voice recognition program.  Efforts were made to edit the dictations but occasionally words are mis-transcribed.)    Torrey Terrazas MD  Attending Emergency Physician El Neal MD  09/11/22 8441

## 2022-09-12 DIAGNOSIS — M25.572 LEFT ANKLE PAIN, UNSPECIFIED CHRONICITY: Primary | ICD-10-CM

## 2022-10-13 ENCOUNTER — HOSPITAL ENCOUNTER (EMERGENCY)
Age: 22
Discharge: HOME | End: 2022-10-14
Attending: EMERGENCY MEDICINE
Payer: MEDICAID

## 2022-10-13 DIAGNOSIS — R10.84 GENERALIZED ABDOMINAL PAIN: Primary | ICD-10-CM

## 2022-10-13 LAB
ABSOLUTE EOS #: 0.1 K/UL (ref 0–0.4)
ABSOLUTE LYMPH #: 2.7 K/UL (ref 1.2–5.2)
ABSOLUTE MONO #: 0.7 K/UL (ref 0.1–1.4)
BASOPHILS # BLD: 0 % (ref 0–2)
BASOPHILS ABSOLUTE: 0 K/UL (ref 0–0.2)
EOSINOPHILS RELATIVE PERCENT: 1 % (ref 1–4)
HCT VFR BLD CALC: 40.1 % (ref 36–46)
HEMOGLOBIN: 13.2 G/DL (ref 12–16)
LYMPHOCYTES # BLD: 23 % (ref 25–45)
MCH RBC QN AUTO: 25.7 PG (ref 26–34)
MCHC RBC AUTO-ENTMCNC: 32.9 G/DL (ref 31–37)
MCV RBC AUTO: 78.1 FL (ref 80–100)
MONOCYTES # BLD: 6 % (ref 2–8)
PDW BLD-RTO: 14.5 % (ref 12.5–15.4)
PLATELET # BLD: 347 K/UL (ref 140–450)
PMV BLD AUTO: 7.4 FL (ref 6–12)
RBC # BLD: 5.14 M/UL (ref 4–5.2)
SEG NEUTROPHILS: 70 % (ref 34–64)
SEGMENTED NEUTROPHILS ABSOLUTE COUNT: 8 K/UL (ref 1.8–8)
WBC # BLD: 11.4 K/UL (ref 4.5–13.5)

## 2022-10-13 PROCEDURE — 83690 ASSAY OF LIPASE: CPT

## 2022-10-13 PROCEDURE — 99284 EMERGENCY DEPT VISIT MOD MDM: CPT

## 2022-10-13 PROCEDURE — 85025 COMPLETE CBC W/AUTO DIFF WBC: CPT

## 2022-10-13 PROCEDURE — 84702 CHORIONIC GONADOTROPIN TEST: CPT

## 2022-10-13 PROCEDURE — 2580000003 HC RX 258: Performed by: EMERGENCY MEDICINE

## 2022-10-13 PROCEDURE — 80053 COMPREHEN METABOLIC PANEL: CPT

## 2022-10-13 PROCEDURE — 6370000000 HC RX 637 (ALT 250 FOR IP): Performed by: EMERGENCY MEDICINE

## 2022-10-13 RX ORDER — QUETIAPINE FUMARATE 100 MG/1
TABLET, FILM COATED ORAL
COMMUNITY
Start: 2022-07-30

## 2022-10-13 RX ORDER — LEVOTHYROXINE SODIUM 0.05 MG/1
TABLET ORAL
COMMUNITY
Start: 2022-08-15

## 2022-10-13 RX ORDER — 0.9 % SODIUM CHLORIDE 0.9 %
1000 INTRAVENOUS SOLUTION INTRAVENOUS ONCE
Status: COMPLETED | OUTPATIENT
Start: 2022-10-13 | End: 2022-10-14

## 2022-10-13 RX ORDER — TRAZODONE HYDROCHLORIDE 100 MG/1
TABLET ORAL
COMMUNITY
Start: 2022-08-24

## 2022-10-13 RX ORDER — HYDROXYZINE HYDROCHLORIDE 25 MG/1
TABLET, FILM COATED ORAL
COMMUNITY
Start: 2022-08-19

## 2022-10-13 RX ORDER — ONDANSETRON 4 MG/1
4 TABLET, ORALLY DISINTEGRATING ORAL EVERY 8 HOURS PRN
Status: DISCONTINUED | OUTPATIENT
Start: 2022-10-13 | End: 2022-10-14 | Stop reason: HOSPADM

## 2022-10-13 RX ORDER — SERTRALINE HYDROCHLORIDE 100 MG/1
100 TABLET, FILM COATED ORAL DAILY
COMMUNITY

## 2022-10-13 RX ADMIN — ONDANSETRON 4 MG: 4 TABLET, ORALLY DISINTEGRATING ORAL at 23:15

## 2022-10-13 RX ADMIN — SODIUM CHLORIDE 1000 ML: 9 INJECTION, SOLUTION INTRAVENOUS at 23:40

## 2022-10-13 ASSESSMENT — PAIN DESCRIPTION - LOCATION: LOCATION: ABDOMEN

## 2022-10-13 ASSESSMENT — PAIN SCALES - GENERAL: PAINLEVEL_OUTOF10: 10

## 2022-10-13 ASSESSMENT — ENCOUNTER SYMPTOMS
ABDOMINAL PAIN: 1
VOMITING: 1
RESPIRATORY NEGATIVE: 1
ALLERGIC/IMMUNOLOGIC NEGATIVE: 1
EYES NEGATIVE: 1

## 2022-10-14 VITALS
BODY MASS INDEX: 23.9 KG/M2 | TEMPERATURE: 98.2 F | DIASTOLIC BLOOD PRESSURE: 76 MMHG | OXYGEN SATURATION: 99 % | RESPIRATION RATE: 16 BRPM | HEIGHT: 64 IN | HEART RATE: 82 BPM | WEIGHT: 140 LBS | SYSTOLIC BLOOD PRESSURE: 111 MMHG

## 2022-10-14 LAB
ALBUMIN SERPL-MCNC: 4.7 G/DL (ref 3.5–5.2)
ALBUMIN/GLOBULIN RATIO: 1 (ref 1–2.5)
ALP BLD-CCNC: 95 U/L (ref 35–104)
ALT SERPL-CCNC: 25 U/L (ref 5–33)
ANION GAP SERPL CALCULATED.3IONS-SCNC: 11 MMOL/L (ref 9–17)
AST SERPL-CCNC: 24 U/L
BILIRUB SERPL-MCNC: 0.1 MG/DL (ref 0.3–1.2)
BUN BLDV-MCNC: 11 MG/DL (ref 6–20)
CALCIUM SERPL-MCNC: 11.9 MG/DL (ref 8.6–10.4)
CHLORIDE BLD-SCNC: 97 MMOL/L (ref 98–107)
CO2: 28 MMOL/L (ref 20–31)
CREAT SERPL-MCNC: 0.53 MG/DL (ref 0.5–0.9)
GFR SERPL CREATININE-BSD FRML MDRD: >60 ML/MIN/1.73M2
GLUCOSE BLD-MCNC: 91 MG/DL (ref 70–99)
HCG QUANTITATIVE: <1 MIU/ML
LIPASE: 21 U/L (ref 13–60)
POTASSIUM SERPL-SCNC: 4.1 MMOL/L (ref 3.7–5.3)
SODIUM BLD-SCNC: 136 MMOL/L (ref 135–144)
TOTAL PROTEIN: 9.2 G/DL (ref 6.4–8.3)

## 2022-10-14 ASSESSMENT — PAIN - FUNCTIONAL ASSESSMENT: PAIN_FUNCTIONAL_ASSESSMENT: 0-10

## 2022-10-14 ASSESSMENT — PAIN SCALES - GENERAL: PAINLEVEL_OUTOF10: 10

## 2022-10-14 NOTE — ED NOTES
Pt brought in by squad from home. C/o generalized abdominal pain & Nausea. Pt also is concerned that she may be pregnant & has not had a menstrual cycle since August. Pain level of 10/10 & 100mg of OTC tylenol taken PTA. RR are easy/unlabored, AOX4.      Racquel Draper, RN  10/13/22 [address], RN  10/14/22 0001

## 2022-10-14 NOTE — ED NOTES
Relayed to Dr. Gonzalez Re that pts peripheral iv infiltrated while fluids were running-this was her 3rd poke.  Holding off on another insertion attempt until blood work gets back & confirms pregnancy     Karen Daugherty RN  10/13/22 2362

## 2022-10-14 NOTE — ED PROVIDER NOTES
eMERGENCY dEPARTMENT eNCOUnter      Pt Name: Archie Ramos  MRN: [id number]  [place] 2000  Date of evaluation: 10/13/2022      CHIEF COMPLAINT       Chief Complaint   Patient presents with    Abdominal Pain          HISTORY OF PRESENT ILLNESS    Archie Ramos is a 23 y.o. female who presents to department for evaluation of about a 3-day history of generalized abdominal pain. Patient is mildly MRDD and is not a very good historian. Patient states she has had some nausea vomited times once and has been going to the bathroom multiple times today. Apparently the patient did take a home pregnancy test which was positive. We will recheck this tonight. REVIEW OF SYSTEMS       Review of Systems   Constitutional: Negative. HENT: Negative. Eyes: Negative. Respiratory: Negative. Cardiovascular: Negative. Gastrointestinal:  Positive for abdominal pain and vomiting. Endocrine: Negative. Genitourinary: Negative. Musculoskeletal: Negative. Skin: Negative. Allergic/Immunologic: Negative. Neurological: Negative. Hematological: Negative. Psychiatric/Behavioral: Negative. PAST MEDICAL HISTORY    has a past medical history of Anxiety, Bipolar 1 disorder ([address] Heart murmur, PTSD (post-traumatic stress disorder), and Thyroid disease. SURGICAL HISTORY      has no past surgical history on file.     CURRENT MEDICATIONS       Previous Medications    APIXABAN STARTER PACK (ELIQUIS DVT/PE STARTER PACK) 5 MG TBPK TABLET    Take 1 tablet by mouth See Admin Instructions    HYDROXYZINE HCL (ATARAX) 25 MG TABLET    TAKE 1 TABLET BY MOUTH THREE TIMES DAILY    LEVOTHYROXINE (SYNTHROID) 50 MCG TABLET    TAKE 1 TABLET BY MOUTH EVERY DAY    QUETIAPINE (SEROQUEL) 100 MG TABLET    TAKE 1 TABLET BY MOUTH EVERY DAY AT BEDTIME    SERTRALINE (ZOLOFT) 100 MG TABLET    Take 100 mg by mouth daily Pt takes 1.5 tab per day  150mg q morning    TRAZODONE (DESYREL) 100 MG TABLET           ALLERGIES is allergic to penicillins. FAMILY HISTORY     has no family status information on file. family history is not on file. SOCIAL HISTORY      reports that she has never smoked. She has never used smokeless tobacco. She reports that she does not currently use alcohol. She reports that she does not currently use drugs. PHYSICAL EXAM     INITIAL VITALS:  height is 5' 4\" (1.626 m) and weight is 63.5 kg (140 lb). Her oral temperature is 98.2 °F (36.8 °C). Her blood pressure is 111/76 and her pulse is 82. Her respiration is 16 and oxygen saturation is 99%. Constitutional: Alert, oriented x3, nontoxic, afebrile, answering questions appropriately, acting properly for age, in no acute distress  HEENT: Extraocular muscles intact, mucus membranes moist, TMs clear bilaterally, no posterior pharyngeal erythema or exudates, Pupils equal, round, reactive to light,   Neck: Trachea midline, Supple without lymphadenopathy, no posterior midline neck tenderness to palpation  Cardiovascular: Regular rhythm and rate no S3, S4, or murmurs  Respiratory: Clear to auscultation bilaterally no wheezes, rhonchi, rales, no respiratory distress  Gastrointestinal: Soft, nontender, nondistended, positive bowel sounds. No rebound, rigidity, or guarding. Musculoskeletal: No extremity pain or swelling  Neurologic: Moving all 4 extremities without difficulty there are no gross focal neurologic deficits  Skin: Warm and dry    DIFFERENTIAL DIAGNOSIS/ MDM:     Abdominal lpain uncertain etiology will get a go ahead and check to see if the patient is actually pregnant and that will determine what else we can do here in the department. We will do some baseline belly labs and give her some Zofran. She will also get a liter of fluid.     DIAGNOSTIC RESULTS     EKG: All EKG's are interpreted by the Emergency Department Physician who either signs or Co-signs this chart in the absence of a cardiologist.        Not indicated unless otherwise documented above    LABS:  Results for orders placed or performed during the hospital encounter of 10/13/22   HCG, Quantitative, Pregnancy   Result Value Ref Range    hCG Quant <1 <5 mIU/mL   CBC with Auto Differential   Result Value Ref Range    WBC 11.4 4.5 - 13.5 k/uL    RBC 5.14 4.0 - 5.2 m/uL    Hemoglobin 13.2 12.0 - 16.0 g/dL    Hematocrit 40.1 36 - 46 %    MCV 78.1 (L) 80 - 100 fL    MCH 25.7 (L) 26 - 34 pg    MCHC 32.9 31 - 37 g/dL    RDW 14.5 12.5 - 15.4 %    Platelets 761 416 - 423 k/uL    MPV 7.4 6.0 - 12.0 fL    Seg Neutrophils 70 (H) 34 - 64 %    Lymphocytes 23 (L) 25 - 45 %    Monocytes 6 2 - 8 %    Eosinophils % 1 1 - 4 %    Basophils 0 0 - 2 %    Segs Absolute 8.00 1.8 - 8.0 k/uL    Absolute Lymph # 2.70 1.2 - 5.2 k/uL    Absolute Mono # 0.70 0.1 - 1.4 k/uL    Absolute Eos # 0.10 0.0 - 0.4 k/uL    Basophils Absolute 0.00 0.0 - 0.2 k/uL   Comprehensive Metabolic Panel   Result Value Ref Range    Glucose 91 70 - 99 mg/dL    BUN 11 6 - 20 mg/dL    Creatinine 0.53 0.50 - 0.90 mg/dL    Est, Glom Filt Rate >60 >60 mL/min/1.73m2    Calcium 11.9 (H) 8.6 - 10.4 mg/dL    Sodium 136 135 - 144 mmol/L    Potassium 4.1 3.7 - 5.3 mmol/L    Chloride 97 (L) 98 - 107 mmol/L    CO2 28 20 - 31 mmol/L    Anion Gap 11 9 - 17 mmol/L    Alkaline Phosphatase 95 35 - 104 U/L    ALT 25 5 - 33 U/L    AST 24 <32 U/L    Total Bilirubin 0.1 (L) 0.3 - 1.2 mg/dL    Total Protein 9.2 (H) 6.4 - 8.3 g/dL    Albumin 4.7 3.5 - 5.2 g/dL    Albumin/Globulin Ratio 1.0 1.0 - 2.5   Lipase   Result Value Ref Range    Lipase 21 13 - 60 U/L       Not indicated unless otherwise documented above    RADIOLOGY:   I reviewed the radiologist interpretations:  No orders to display       Not indicated unless otherwise documented above    EMERGENCY DEPARTMENT COURSE:     The patient was given the following medications:  Orders Placed This Encounter   Medications    0.9 % sodium chloride bolus    ondansetron (ZOFRAN-ODT) disintegrating tablet 4 mg Vitals:    Vitals:    10/13/22 2245 10/13/22 2248 10/14/22 0001   BP: 122/66  111/76   Pulse: 76  82   Resp: 12  16   Temp:  98.2 °F (36.8 °C)    TempSrc:  Oral    SpO2: 99%  99%   Weight: 63.5 kg (140 lb)     Height: 5' 4\" (1.626 m)       -------------------------  /76   Pulse 82   Temp 98.2 °F (36.8 °C) (Oral)   Resp 16   Ht 5' 4\" (1.626 m)   Wt 63.5 kg (140 lb)   LMP 08/02/2022 (Approximate)   SpO2 99%   BMI 24.03 kg/m²         I have reviewed the disposition diagnosis with the patient and or their family/guardian. I have answered their questions and given discharge instructions. They voiced understanding of these instructions and did not have any further questions or complaints. CRITICAL CARE:    None    CONSULTS:    None    PROCEDURES:    None      OARRS Report if indicated             FINAL IMPRESSION      1. Generalized abdominal pain          DISPOSITION/PLAN   DISPOSITION Decision To Discharge    I have reviewed the disposition diagnosis with the patient and or their family/guardian. I have answered their questions and given discharge instructions. They voiced understanding of these instructions and did not have any further questions or complaints. Reevaluation: Patient has not vomited since she has been here she is feeling better. Her her laboratories are unremarkable and her pregnancy test is negative. I feel the patient has some dyspepsia and that she can take over-the-counter medications for this and be evaluated tomorrow by her family doctors. She is stable for discharge home. PATIENT REFERRED TO:    Follow-up with your own doctor in 1 to 2 days.         DISCHARGE MEDICATIONS:  New Prescriptions    No medications on file       (Please note that portions of this note were completed with a voice recognition program.  Efforts were made to edit the dictations but occasionally words are mis-transcribed.)    Javier Vazquez MD  Attending Emergency Physician Donavan Morales MD  10/14/22 0810

## 2022-10-14 NOTE — DISCHARGE INSTRUCTIONS
Liquid diet x1 day, you may take Gas-X for any of the pain that you might be having. Follow-up with your doctor tomorrow and return back to the emergency setting if you are worsening in any way. Your pregnancy test was negative.

## 2022-11-18 ENCOUNTER — APPOINTMENT (OUTPATIENT)
Dept: CT IMAGING | Age: 22
End: 2022-11-18
Payer: MEDICAID

## 2022-11-18 ENCOUNTER — APPOINTMENT (OUTPATIENT)
Dept: GENERAL RADIOLOGY | Age: 22
End: 2022-11-18
Payer: MEDICAID

## 2022-11-18 ENCOUNTER — HOSPITAL ENCOUNTER (EMERGENCY)
Age: 22
Discharge: HOME | End: 2022-11-19
Attending: EMERGENCY MEDICINE
Payer: MEDICAID

## 2022-11-18 VITALS
OXYGEN SATURATION: 95 % | HEART RATE: 89 BPM | DIASTOLIC BLOOD PRESSURE: 80 MMHG | RESPIRATION RATE: 20 BRPM | SYSTOLIC BLOOD PRESSURE: 121 MMHG | TEMPERATURE: 98 F

## 2022-11-18 DIAGNOSIS — R55 NEAR SYNCOPE: Primary | ICD-10-CM

## 2022-11-18 LAB
ABSOLUTE EOS #: 0.11 K/UL (ref 0–0.44)
ABSOLUTE IMMATURE GRANULOCYTE: <0.03 K/UL (ref 0–0.3)
ABSOLUTE LYMPH #: 3.08 K/UL (ref 1.2–5.2)
ABSOLUTE MONO #: 0.5 K/UL (ref 0.1–1.4)
ANION GAP SERPL CALCULATED.3IONS-SCNC: 10 MMOL/L (ref 9–17)
BASOPHILS # BLD: 0 % (ref 0–2)
BASOPHILS ABSOLUTE: <0.03 K/UL (ref 0–0.2)
BUN BLDV-MCNC: 15 MG/DL (ref 6–20)
CALCIUM SERPL-MCNC: 10.9 MG/DL (ref 8.6–10.4)
CHLORIDE BLD-SCNC: 107 MMOL/L (ref 98–107)
CO2: 25 MMOL/L (ref 20–31)
CREAT SERPL-MCNC: 0.52 MG/DL (ref 0.5–0.9)
EOSINOPHILS RELATIVE PERCENT: 2 % (ref 1–4)
GFR SERPL CREATININE-BSD FRML MDRD: >60 ML/MIN/1.73M2
GLUCOSE BLD-MCNC: 93 MG/DL (ref 70–99)
HCG QUALITATIVE: NEGATIVE
HCT VFR BLD CALC: 35.7 % (ref 36.3–47.1)
HEMOGLOBIN: 11.3 G/DL (ref 11.9–15.1)
IMMATURE GRANULOCYTES: 0 %
LYMPHOCYTES # BLD: 48 % (ref 25–45)
MCH RBC QN AUTO: 26.2 PG (ref 25.2–33.5)
MCHC RBC AUTO-ENTMCNC: 31.7 G/DL (ref 28.4–34.8)
MCV RBC AUTO: 82.6 FL (ref 82.6–102.9)
MONOCYTES # BLD: 8 % (ref 2–8)
NRBC AUTOMATED: 0 PER 100 WBC
PDW BLD-RTO: 13.8 % (ref 11.8–14.4)
PLATELET # BLD: 251 K/UL (ref 138–453)
PMV BLD AUTO: 9.4 FL (ref 8.1–13.5)
POTASSIUM SERPL-SCNC: 3.8 MMOL/L (ref 3.7–5.3)
PRO-BNP: <20 PG/ML
RBC # BLD: 4.32 M/UL (ref 3.95–5.11)
SEG NEUTROPHILS: 42 % (ref 34–64)
SEGMENTED NEUTROPHILS ABSOLUTE COUNT: 2.7 K/UL (ref 1.8–8)
SODIUM BLD-SCNC: 142 MMOL/L (ref 135–144)
TROPONIN, HIGH SENSITIVITY: <6 NG/L (ref 0–14)
WBC # BLD: 6.4 K/UL (ref 4.5–13.5)

## 2022-11-18 PROCEDURE — 84443 ASSAY THYROID STIM HORMONE: CPT

## 2022-11-18 PROCEDURE — 84484 ASSAY OF TROPONIN QUANT: CPT

## 2022-11-18 PROCEDURE — 99285 EMERGENCY DEPT VISIT HI MDM: CPT

## 2022-11-18 PROCEDURE — 80048 BASIC METABOLIC PNL TOTAL CA: CPT

## 2022-11-18 PROCEDURE — 84703 CHORIONIC GONADOTROPIN ASSAY: CPT

## 2022-11-18 PROCEDURE — 93005 ELECTROCARDIOGRAM TRACING: CPT | Performed by: EMERGENCY MEDICINE

## 2022-11-18 PROCEDURE — 83880 ASSAY OF NATRIURETIC PEPTIDE: CPT

## 2022-11-18 PROCEDURE — 6360000004 HC RX CONTRAST MEDICATION: Performed by: EMERGENCY MEDICINE

## 2022-11-18 PROCEDURE — 71046 X-RAY EXAM CHEST 2 VIEWS: CPT

## 2022-11-18 PROCEDURE — 85025 COMPLETE CBC W/AUTO DIFF WBC: CPT

## 2022-11-18 PROCEDURE — 71260 CT THORAX DX C+: CPT | Performed by: EMERGENCY MEDICINE

## 2022-11-18 RX ADMIN — IOPAMIDOL 75 ML: 755 INJECTION, SOLUTION INTRAVENOUS at 23:23

## 2022-11-18 ASSESSMENT — ENCOUNTER SYMPTOMS
COUGH: 0
DIARRHEA: 0
SHORTNESS OF BREATH: 1
NAUSEA: 0
VOMITING: 0
ABDOMINAL PAIN: 1

## 2022-11-19 LAB — TSH SERPL DL<=0.05 MIU/L-ACNC: 0.98 UIU/ML (ref 0.3–5)

## 2022-11-19 NOTE — ED NOTES
Patient remains resting on cart, calm, watching tv  Call light in hand  No new updates at this time    Patient ambulates to bathroom additional time with [facility], steady gait     Vel Patterson RN  11/19/22 3490

## 2022-11-19 NOTE — ED PROVIDER NOTES
[facility] ED  Emergency Department Encounter  Emergency Medicine Resident     Pt Lorraine Stewart  MRN: [id number]  [place] 2000  Date of evaluation: 11/18/22  PCP:  No primary care provider on file. CHIEF COMPLAINT       Chief Complaint   Patient presents with    Dizziness    Pain     All over      Other     Family wants her to checked for MS        HISTORY OF PRESENT ILLNESS  (Location/Symptom, Timing/Onset, Context/Setting, Quality, Duration, Modifying Factors, Severity.)      Ari Owens is a 23 y.o. female who presents with dizziness/lightheadedness, diffuse pain, near syncope event. Patient has past medical history of ADHD, PTSD, bipolar, depression, autism, anxiety. Patient has been taking BuSpar, Zoloft, clonazepam daily, pantoprazole. Patient reports she has been compliant with Eliquis since being diagnosed with pulmonary embolism. Patient says she has a longstanding history of syncope episodes with transient memory loss. Patient reports she currently does not remember any details regarding prior syncope events. Today, patient was climbing up stairs and she felt shortness of breath and had difficulty speaking in complete sentences. Patient was speaking to fiancé on the phone and felt like she was going to faint (lightheaded, dizzy). Patient is also currently reporting diffuse pain on her entire body with weakness/numbness mainly in her lower extremities. Patient admits her current symptoms are similar to the symptoms she was experiencing with her pulmonary embolism. Denies nausea, vomiting, diarrhea, rash, cough. PAST MEDICAL / SURGICAL / SOCIAL / FAMILY HISTORY      has a past medical history of Anxiety, Bipolar 1 disorder ([address] Heart murmur, PTSD (post-traumatic stress disorder), and Thyroid disease. has no past surgical history on file.       Social History     Socioeconomic History    Marital status: Single     Spouse name: Not on file    Number of children: Not on file    Years of education: Not on file    Highest education level: Not on file   Occupational History    Not on file   Tobacco Use    Smoking status: Never    Smokeless tobacco: Never   Substance and Sexual Activity    Alcohol use: Not Currently    Drug use: Not Currently    Sexual activity: Not on file   Other Topics Concern    Not on file   Social History Narrative    Not on file     Social Determinants of Health     Financial Resource Strain: Not on file   Food Insecurity: Not on file   Transportation Needs: Not on file   Physical Activity: Not on file   Stress: Not on file   Social Connections: Not on file   Intimate Partner Violence: Not on file   Housing Stability: Not on file       No family history on file. Allergies:  Penicillins and Vyvanse [lisdexamfetamine]    Home Medications:  Prior to Admission medications    Medication Sig Start Date End Date Taking? Authorizing Provider   hydrOXYzine HCl (ATARAX) 25 MG tablet TAKE 1 TABLET BY MOUTH THREE TIMES DAILY 8/19/22   Historical Provider, MD   levothyroxine (SYNTHROID) 50 MCG tablet TAKE 1 TABLET BY MOUTH EVERY DAY 8/15/22   Historical Provider, MD   traZODone (DESYREL) 100 MG tablet  8/24/22   Historical Provider, MD   QUEtiapine (SEROQUEL) 100 MG tablet TAKE 1 TABLET BY MOUTH EVERY DAY AT BEDTIME 7/30/22   Historical Provider, MD   sertraline (ZOLOFT) 100 MG tablet Take 100 mg by mouth daily Pt takes 1.5 tab per day  150mg q morning    Historical Provider, MD   apixaban starter pack (ELIQUIS DVT/PE STARTER PACK) 5 MG TBPK tablet Take 1 tablet by mouth See Admin Instructions 7/28/22   Linsey Huizar MD       REVIEW OF SYSTEMS    (2-9 systems for level 4, 10 or more for level 5)      Review of Systems   Constitutional:  Negative for chills and diaphoresis. Respiratory:  Positive for shortness of breath. Negative for cough. Cardiovascular:  Positive for chest pain. Gastrointestinal:  Positive for abdominal pain.  Negative for diarrhea, nausea and vomiting. Musculoskeletal:  Positive for myalgias. Neurological:  Positive for dizziness, weakness, light-headedness, numbness and headaches. Negative for seizures. PHYSICAL EXAM   (up to 7 for level 4, 8 or more for level 5)      INITIAL VITALS:   /71   Pulse (!) 109   Temp 98 °F (36.7 °C) (Oral)   Resp 18   SpO2 97%     Physical Exam  Constitutional:       General: She is awake. She is not in acute distress. Appearance: She is not ill-appearing, toxic-appearing or diaphoretic. HENT:      Mouth/Throat:      Lips: No lesions. Pharynx: Oropharynx is clear. Cardiovascular:      Rate and Rhythm: Regular rhythm. Tachycardia present. Heart sounds: Normal heart sounds. No murmur heard. Pulmonary:      Effort: Pulmonary effort is normal.      Breath sounds: Normal breath sounds and air entry. Abdominal:      General: Abdomen is flat. Palpations: Abdomen is soft. There is no hepatomegaly, splenomegaly or mass. Tenderness: There is generalized abdominal tenderness. Musculoskeletal:      Comments: Tender to palpation throughout entire body   Lymphadenopathy:      Cervical: No cervical adenopathy. Neurological:      Mental Status: She is alert. Comments: Patient reports lower extremity numbness and weakness on physical exam.   Psychiatric:         Behavior: Behavior is cooperative.        DIFFERENTIAL  DIAGNOSIS     PLAN (LABS / IMAGING / EKG):  Orders Placed This Encounter   Procedures    XR CHEST (2 VW)    CT CHEST PULMONARY EMBOLISM W CONTRAST    Basic Metabolic Panel    Brain Natriuretic Peptide    CBC with Auto Differential    Troponin    HCG Qualitative, Serum    Continuous Pulse Oximetry    EKG 12 Lead    Insert peripheral IV       MEDICATIONS ORDERED:  Orders Placed This Encounter   Medications    iopamidol (ISOVUE-370) 76 % injection 75 mL         DDX: Pulmonary embolism, anemia, NSTEMI, arrhythmia, syncope (vasovagal)    MDM: CBC and BMP to check for anemia and electrolyte disturbances. BNP to check for cardiac volume overload. Troponin to check for NSTEMI. Chest x-ray to check for cardiomegaly, pulmonary infiltrates, any other cardiothoracic abnormalities. Twelve-lead EKG to check for arrhythmia. CT chest with contrast to evaluate for pulmonary embolism. DIAGNOSTIC RESULTS / EMERGENCY DEPARTMENT COURSE / MDM   LAB RESULTS:  Results for orders placed or performed during the hospital encounter of 68/10/33   Basic Metabolic Panel   Result Value Ref Range    Glucose 93 70 - 99 mg/dL    BUN 15 6 - 20 mg/dL    Creatinine 0.52 0.50 - 0.90 mg/dL    Est, Glom Filt Rate >60 >60 mL/min/1.73m2    Calcium 10.9 (H) 8.6 - 10.4 mg/dL    Sodium 142 135 - 144 mmol/L    Potassium 3.8 3.7 - 5.3 mmol/L    Chloride 107 98 - 107 mmol/L    CO2 25 20 - 31 mmol/L    Anion Gap 10 9 - 17 mmol/L   CBC with Auto Differential   Result Value Ref Range    WBC 6.4 4.5 - 13.5 k/uL    RBC 4.32 3.95 - 5.11 m/uL    Hemoglobin 11.3 (L) 11.9 - 15.1 g/dL    Hematocrit 35.7 (L) 36.3 - 47.1 %    MCV 82.6 82.6 - 102.9 fL    MCH 26.2 25.2 - 33.5 pg    MCHC 31.7 28.4 - 34.8 g/dL    RDW 13.8 11.8 - 14.4 %    Platelets 103 327 - 409 k/uL    MPV 9.4 8.1 - 13.5 fL    NRBC Automated 0.0 0.0 per 100 WBC    Seg Neutrophils 42 34 - 64 %    Lymphocytes 48 (H) 25 - 45 %    Monocytes 8 2 - 8 %    Eosinophils % 2 1 - 4 %    Basophils 0 0 - 2 %    Immature Granulocytes 0 0 %    Segs Absolute 2.70 1.80 - 8.00 k/uL    Absolute Lymph # 3.08 1.20 - 5.20 k/uL    Absolute Mono # 0.50 0.10 - 1.40 k/uL    Absolute Eos # 0.11 0.00 - 0.44 k/uL    Basophils Absolute <0.03 0.00 - 0.20 k/uL    Absolute Immature Granulocyte <0.03 0.00 - 0.30 k/uL   Troponin   Result Value Ref Range    Troponin, High Sensitivity PENDING 0 - 14 ng/L   HCG Qualitative, Serum   Result Value Ref Range    hCG Qual NEGATIVE NEGATIVE       IMPRESSION:     RADIOLOGY:  XR CHEST (2 VW)   Final Result   No acute process.          CT CHEST PULMONARY EMBOLISM W CONTRAST    (Results Pending)         EKG  Sinus tachycardia    All EKG's are interpreted by the Emergency Department Physician who either signs or Co-signs this chart in the absence of a cardiologist.    EMERGENCY DEPARTMENT COURSE:  Unremarkable    ED Course as of 11/18/22 2207 Fri Nov 18, 2022 2203 CXR neg [EM]      ED Course User Index  [EM] Russ La MD       No notes of [facility] Admission Criteria type on file. PROCEDURES:  None    CONSULTS:  None    CRITICAL CARE:  None      FINAL IMPRESSION      1.  Near syncope          DISPOSITION / PLAN     DISPOSITION        PATIENT REFERRED TO:  [facility] AT [address] [address] [phone]  Schedule an appointment as soon as possible for a visit   For follow up    [facility]  [address] [facility] [address] [phone]  Go to   As needed    [address]:  New Prescriptions    No medications on file       Rosibel Abbott MD  Emergency Medicine Resident    (Please note that portions of thisnote were completed with a voice recognition program.  Efforts were made to edit the dictations but occasionally words are mis-transcribed.)        Rosibel Abbott MD  Resident  11/18/22 2207

## 2022-11-19 NOTE — DISCHARGE INSTRUCTIONS
Take your medication as indicated and prescribed. If you were given a prescription for prednisone or any other steroid then, take Pepcid (famotidine - over the counter) every day while you are taking the steroids. If you are a diabetic, you should check your blood sugar more frequently while taking prednisone. Use you use an inhaler or nebulizer or were given one to use, then use as prescribed, or at minimum every 4 hours while you are having shortness of breath. If you are given an antibiotic then, make sure you get the prescription filled and take the antibiotics until finished. Drink plenty of water while taking the antibiotics. Avoid drinking alcohol or drinks that have caffeine in it while taking antibiotics. For pain use acetaminophen (Tylenol) or ibuprofen (Motrin / Advil), unless prescribed medications that have acetaminophen or ibuprofen (or similar medications) in it. You can take over the counter acetaminophen tablets (1 - 2 tablets of the 500-mg strength every 6 hours) or ibuprofen tablets (2 tablets every 4 hours). PLEASE RETURN TO THE EMERGENCY DEPARTMENT IMMEDIATELY for worsening symptoms of shortness of breath, wheezing, change in the amount of sputum that you cough up or a change in the color of your sputum, using your inhaler more frequently or if your inhaler only lasts up to 2 hours, or if you develop any concerning symptoms such as: high fever not relieved by acetaminophen (Tylenol) and/or ibuprofen (Motrin / Advil), chills, shortness of breath, chest pain, feeling of your heart fluttering or racing, persistent nausea and/or vomiting, vomiting up blood, blood in your stool, loss of consciousness, numbness, weakness or tingling in the arms or legs or change in color of the extremities, changes in mental status, persistent headache, blurry vision, loss of bladder / bowel control, unable to follow up with your physician, or other any other care or concern.

## 2022-11-19 NOTE — ED NOTES
Patient ambulates to bathroom with writer, steady and strong gait  Denies dizziness  Writer assists with patient ambulating back to room, reconnected to monitoring devices   Patient able to reposition self on stretcher  No issues at this time  Call light in reach       Tamica Mar RN  11/18/22 3575

## 2022-11-19 NOTE — ED PROVIDER NOTES
[facility] ED     Emergency Department     Faculty Attestation    I performed a history and physical examination of the patient and discussed management with the resident. I reviewed the residents note and agree with the documented findings and plan of care. Any areas of disagreement are noted on the chart. I was personally present for the key portions of any procedures. I have documented in the chart those procedures where I was not present during the key portions. I have reviewed the emergency nurses triage note. I agree with the chief complaint, past medical history, past surgical history, allergies, medications, social and family history as documented unless otherwise noted below. For Physician Assistant/ Nurse Practitioner cases/documentation I have personally evaluated this patient and have completed at least one if not all key elements of the E/M (history, physical exam, and MDM). Additional findings are as noted. Patient here with a myriad of complaints however chief which is chest pain shortness of breath with palpitations. Of note patient new diagnosis of a pulmonary embolism 4 months ago placed on Eliquis. She states she is compliant with it and has not missed any doses. Near syncopal episode tonight but did not hit her head denies headache. Complains of bilateral lower extremity weakness however she is moving spontaneously in the bed and with distraction has 5 out of 5 strength in all muscle groups of the lower extremity. Of note she also states she walks to the hospital.  Equal radial pedal pulses bilaterally. Lungs clear and equal.  Heart regular no murmur. Abdomen soft nontender. Normal pupils. Will proceed with cardiac work-up including planned PE study is high risk by [place] given tachycardia prior PE with symptoms reevaluate    EKG interpretation: Sinus tachycardia 102. Normal intervals normal axis no acute ST or T changes normal EKG except for rate.     Critical Care     none    Lucie Ludwig MD, Yvonne Dumont  Attending Emergency  Physician           Lucie Ludwig MD  11/18/22 1044

## 2022-11-19 NOTE — ED PROVIDER NOTES
[facility] [address] ED  Emergency Department  Emergency Medicine Resident Sign-out     Care of Murphy Roca was assumed from Dr. Reina Hdez and is being seen for Dizziness, Pain (All over/), and Other (Family wants her to checked for MS )  . The patient's initial evaluation and plan have been discussed with the prior provider who initially evaluated the patient. EMERGENCY DEPARTMENT COURSE / MEDICAL DECISION MAKING:       MEDICATIONS GIVEN:  Orders Placed This Encounter   Medications    iopamidol (ISOVUE-370) 76 % injection 75 mL         LABS / RADIOLOGY:     Labs Reviewed   BASIC METABOLIC PANEL - Abnormal; Notable for the following components:       Result Value    Calcium 10.9 (*)     All other components within normal limits   CBC WITH AUTO DIFFERENTIAL - Abnormal; Notable for the following components:    Hemoglobin 11.3 (*)     Hematocrit 35.7 (*)     Lymphocytes 48 (*)     All other components within normal limits   BRAIN NATRIURETIC PEPTIDE   TROPONIN   HCG, SERUM, QUALITATIVE   TSH WITH REFLEX       XR CHEST (2 VW)    Result Date: 11/18/2022  EXAMINATION: TWO XRAY VIEWS OF THE CHEST 11/18/2022 6:32 pm COMPARISON: 07/28/2022 HISTORY: ORDERING SYSTEM PROVIDED HISTORY: chest pain TECHNOLOGIST PROVIDED HISTORY: chest pain FINDINGS: The lungs are without acute focal process. There is no effusion or pneumothorax. The cardiomediastinal silhouette is without acute process. The osseous structures are without acute process. No acute process. RECENT VITALS:     Temp: 98 °F (36.7 °C),  Heart Rate: 89, Resp: 20, BP: 121/80, SpO2: 95 %      This patient is a 23 y.o. Female with near syncopal episode, shortness of breath and dizziness, history of PE, on Eliquis. Patient reports she has been compliant. Patient is homeless. Awaiting PE work-up. Lab work and work-up grossly unremarkable. CT PE unremarkable.   Discussed with patient with regards to follow-up with primary care physician and for strict return precautions. Patient verbalized agreement understanding. Stable for discharge. ED Course as of 11/19/22 0041   Fri Nov 18, 2022   2203 CXR neg [EM]   Sat Nov 19, 2022   0011 CT PE neg [EM]      ED Course User Index  [EM] Adriana Adams MD       OUTSTANDING TASKS / RECOMMENDATIONS:    Labs  CT PE  Dispo     FINAL IMPRESSION:     1.  Near syncope        DISPOSITION:         DISPOSITION:  [x]  Discharge   []  Transfer -    []  Admission -     []  Against Medical Advice   []  [place]   FOLLOW-UP: [address] [id number] [phone]  Schedule an appointment as soon as possible for a visit   For follow up    [facility] ED  [place] [id number] [phone]  Go to   As needed   DISCHARGE MEDICATIONS: New Prescriptions    No medications on file          Adriana Adams MD  Emergency Medicine Resident  [facility]       Adriana Adams MD  Resident  11/19/22 0972

## 2022-11-19 NOTE — ED NOTES
Additional warm blankets applied for comfort, pillow also applied for comfort  Call light in hand     Trelavijaya FraireGuthrie [facility] Hospital  11/18/22 1361

## 2022-11-20 LAB
EKG ATRIAL RATE: 102 BPM
EKG P AXIS: 53 DEGREES
EKG P-R INTERVAL: 176 MS
EKG Q-T INTERVAL: 324 MS
EKG QRS DURATION: 76 MS
EKG QTC CALCULATION (BAZETT): 422 MS
EKG R AXIS: 67 DEGREES
EKG T AXIS: 20 DEGREES
EKG VENTRICULAR RATE: 102 BPM

## 2022-11-20 PROCEDURE — 93010 ELECTROCARDIOGRAM REPORT: CPT | Performed by: INTERNAL MEDICINE

## 2022-11-22 ENCOUNTER — APPOINTMENT (OUTPATIENT)
Dept: GENERAL RADIOLOGY | Age: 22
End: 2022-11-22
Payer: MEDICAID

## 2022-11-22 ENCOUNTER — HOSPITAL ENCOUNTER (EMERGENCY)
Age: 22
Discharge: HOME | End: 2022-11-22
Attending: EMERGENCY MEDICINE
Payer: MEDICAID

## 2022-11-22 VITALS
SYSTOLIC BLOOD PRESSURE: 101 MMHG | BODY MASS INDEX: 24.59 KG/M2 | HEART RATE: 81 BPM | HEIGHT: 64 IN | DIASTOLIC BLOOD PRESSURE: 61 MMHG | WEIGHT: 144 LBS | TEMPERATURE: 99.3 F | OXYGEN SATURATION: 100 % | RESPIRATION RATE: 16 BRPM

## 2022-11-22 DIAGNOSIS — M62.838 SPASM OF MUSCLE: Primary | ICD-10-CM

## 2022-11-22 LAB — HCG(URINE) PREGNANCY TEST: NEGATIVE

## 2022-11-22 PROCEDURE — 72040 X-RAY EXAM NECK SPINE 2-3 VW: CPT

## 2022-11-22 PROCEDURE — 81025 URINE PREGNANCY TEST: CPT

## 2022-11-22 PROCEDURE — 72072 X-RAY EXAM THORAC SPINE 3VWS: CPT

## 2022-11-22 PROCEDURE — 99284 EMERGENCY DEPT VISIT MOD MDM: CPT

## 2022-11-22 PROCEDURE — 72100 X-RAY EXAM L-S SPINE 2/3 VWS: CPT

## 2022-11-22 ASSESSMENT — PAIN - FUNCTIONAL ASSESSMENT
PAIN_FUNCTIONAL_ASSESSMENT: 0-10
PAIN_FUNCTIONAL_ASSESSMENT: 0-10

## 2022-11-22 ASSESSMENT — PAIN SCALES - GENERAL
PAINLEVEL_OUTOF10: 10
PAINLEVEL_OUTOF10: 6

## 2022-11-23 NOTE — ED PROVIDER NOTES
[facility]     Emergency Department     Faculty Attestation    I performed a history and physical examination of the patient and discussed management with the resident. I reviewed the residents note and agree with the documented findings and plan of care. Any areas of disagreement are noted on the chart. I was personally present for the key portions of any procedures. I have documented in the chart those procedures where I was not present during the key portions. I have reviewed the emergency nurses triage note. I agree with the chief complaint, past medical history, past surgical history, allergies, medications, social and family history as documented unless otherwise noted below. For Physician Assistant/ Nurse Practitioner cases/documentation I have personally evaluated this patient and have completed at least one if not all key elements of the E/M (history, physical exam, and MDM). Additional findings are as noted. Presents with back pain after she fell out of a chair 2 days ago. She says she did hit her head at that time but did not lose consciousness. She is on Eliquis. Patient states that her entire back hurts. She denies any chest pain or abdominal pain. Patient says she did walk here today to be seen. On exam, patient is resting comfortably in the bed playing on her cell phone. She appears well. She is alert and oriented and answers questions appropriately. Lungs clear to auscultation bilaterally heart sounds are normal.  Abdomen is soft and nontender. Anywhere I touched lightly on patient's back she screams out in pain. There is no obvious midline step-offs or deformities. I do not feel that CT scan of patient's head is indicated at this time as patient is 2 days out from the fall and mental status and neurologic exam is normal.  We will get x-rays of the cervical, thoracic, and lumbar spine. We will treat patient's pain.       Baron Bertrand MD  Attending Emergency Physician           Harshil Lowry MD  11/22/22 5161

## 2022-11-23 NOTE — ED PROVIDER NOTES
[facility] ED  Emergency Department Encounter  EmergencyMedicine Resident     Pt Alex Rosales  MRN: [id number]  [place] 2000  Date of evaluation: 11/22/22  PCP:  No primary care provider on file. CHIEF COMPLAINT       Chief Complaint   Patient presents with    Back Pain     C/o entire back pain and head ache after falling out of a chair on Sunday       HISTORY OF PRESENT ILLNESS  (Location/Symptom, Timing/Onset, Context/Setting, Quality, Duration, Modifying Factors, Severity.)      Sita Jorgensen is a 23 y.o. female who presents with back pain and headache. Patient states that she fell out of a chair on Sunday. Denies any loss of consciousness. States that she has lost feeling in her bilateral legs, is unable to walk. When asked how patient arrived in our ER she states that she walks. Denies any nausea or vomiting. Does state that she is on Eliquis due to a history of DVT. Patient states that she was given a muscle relaxer by a Dr. Summer Ram earlier today. PAST MEDICAL / SURGICAL / SOCIAL / FAMILY HISTORY      has a past medical history of Anxiety, Bipolar 1 disorder ([address] Heart murmur, PTSD (post-traumatic stress disorder), and Thyroid disease. has no past surgical history on file.   No past medical history on review    Social History     Socioeconomic History    Marital status: Single     Spouse name: Not on file    Number of children: Not on file    Years of education: Not on file    Highest education level: Not on file   Occupational History    Not on file   Tobacco Use    Smoking status: Never    Smokeless tobacco: Never   Substance and Sexual Activity    Alcohol use: Not Currently    Drug use: Not Currently    Sexual activity: Not on file   Other Topics Concern    Not on file   Social History Narrative    Not on file     Social Determinants of Health     Financial Resource Strain: Not on file   Food Insecurity: Not on file   Transportation Needs: Not on file Physical Activity: Not on file   Stress: Not on file   Social Connections: Not on file   Intimate Partner Violence: Not on file   Housing Stability: Not on file       History reviewed. No pertinent family history. Allergies:  Penicillins and Vyvanse [lisdexamfetamine]    Home Medications:  Prior to Admission medications    Medication Sig Start Date End Date Taking? Authorizing Provider   hydrOXYzine HCl (ATARAX) 25 MG tablet TAKE 1 TABLET BY MOUTH THREE TIMES DAILY 8/19/22   Historical Provider, MD   levothyroxine (SYNTHROID) 50 MCG tablet TAKE 1 TABLET BY MOUTH EVERY DAY 8/15/22   Historical Provider, MD   traZODone (DESYREL) 100 MG tablet  8/24/22   Historical Provider, MD   QUEtiapine (SEROQUEL) 100 MG tablet TAKE 1 TABLET BY MOUTH EVERY DAY AT BEDTIME 7/30/22   Historical Provider, MD   sertraline (ZOLOFT) 100 MG tablet Take 100 mg by mouth daily Pt takes 1.5 tab per day  150mg q morning    Historical Provider, MD   apixaban starter pack (ELIQUIS DVT/PE STARTER PACK) 5 MG TBPK tablet Take 1 tablet by mouth See Admin Instructions 7/28/22   Linsey Huizar MD       REVIEW OF SYSTEMS    (2-9 systems for level 4, 10 or more for level 5)      Review of Systems   Constitutional:  Negative for chills and fever. HENT:  Negative for ear pain, postnasal drip, sore throat and trouble swallowing. Eyes:  Negative for visual disturbance. Respiratory:  Negative for cough, chest tightness and shortness of breath. Cardiovascular:  Negative for chest pain. Gastrointestinal:  Negative for abdominal distention, abdominal pain, constipation, diarrhea and vomiting. Genitourinary:  Negative for difficulty urinating, dysuria, flank pain and vaginal discharge. Musculoskeletal:  Positive for back pain and gait problem. Negative for neck pain. Neurological:  Positive for weakness. Negative for syncope, numbness and headaches. Psychiatric/Behavioral:  Negative for confusion.       PHYSICAL EXAM   (up to 7 for level 4, 8 or more for level 5)      INITIAL VITALS:   /61   Pulse 81   Temp 99.3 °F (37.4 °C) (Oral)   Resp 16   Ht 5' 4\" (1.626 m)   Wt 144 lb (65.3 kg)   SpO2 100%   BMI 24.72 kg/m²     Physical Exam  Constitutional:       Appearance: Normal appearance. HENT:      Head: Normocephalic and atraumatic. Right Ear: External ear normal.      Left Ear: External ear normal.   Eyes:      Extraocular Movements: Extraocular movements intact. Cardiovascular:      Rate and Rhythm: Normal rate. Pulses: Normal pulses. Pulmonary:      Effort: Pulmonary effort is normal.      Breath sounds: Normal breath sounds. Abdominal:      Palpations: Abdomen is soft. Tenderness: There is no abdominal tenderness. Musculoskeletal:         General: Normal range of motion. Cervical back: Normal range of motion. Comments: No obvious deformity, no tenderness, strength intact. Neurological:      General: No focal deficit present. Mental Status: She is alert and oriented to person, place, and time. Psychiatric:         Mood and Affect: Mood normal.       DIFFERENTIAL  DIAGNOSIS     PLAN (LABS / IMAGING / EKG):  Orders Placed This Encounter   Procedures    XR LUMBAR SPINE (2-3 VIEWS)    XR THORACIC SPINE (3 VIEWS)    XR CERVICAL SPINE (2-3 VIEWS)    PREGNANCY, URINE       MEDICATIONS ORDERED:  No orders of the defined types were placed in this encounter. DDX: Sprain, strain, fall, closed head injury, dental injury, cauda equina, epidural abscess    MDM: 23 y.o. female presents today with back pain and headache. Patient's exam does not correlate with history, while patient was not looking palpation elicited a response. No overt weakness appreciated.     EMERGENCY DEPARTMENT COURSE:  ED Course as of 11/25/22 0743   Tue Nov 22, 2022 [address] [facility] [SS]      ED Course User Index  [SS] Onelia Moreno MD      Patient has no red flag symptoms, no concern for cauda equina or epidural abscess. Patient has no history of IV drug use. X-ray imaging is unremarkable, no need for CT head as the injury is a few days old and patient is neuro intact. Will discharge with outpatient follow-up. [place] Coma Scale  Eye Opening: Spontaneous  Best Verbal Response: Oriented  Best Motor Response: Obeys commands  [place] Coma Scale Score: 15  DIAGNOSTIC RESULTS / EMERGENCY DEPARTMENT COURSE / MDM   LAB RESULTS:  Results for orders placed or performed during the hospital encounter of 11/22/22   PREGNANCY, URINE   Result Value Ref Range    HCG(Urine) Pregnancy Test NEGATIVE NEGATIVE           RADIOLOGY:  XR LUMBAR SPINE (2-3 VIEWS)   Final Result   No acute abnormality in the cervical spine      No acute abnormality in the thoracic spine      No acute abnormality in the lumbar spine         XR THORACIC SPINE (3 VIEWS)   Final Result   No acute abnormality in the cervical spine      No acute abnormality in the thoracic spine      No acute abnormality in the lumbar spine         XR CERVICAL SPINE (2-3 VIEWS)   Final Result   No acute abnormality in the cervical spine      No acute abnormality in the thoracic spine      No acute abnormality in the lumbar spine                  PROCEDURES:  None    CONSULTS:  None    CRITICAL CARE:  There was significant risk of life threatening deterioration of patient's condition requiring my direct management. Critical care time 0 minutes, excluding any documented procedures.       FINAL IMPRESSION      1. Spasm of muscle          DISPOSITION / PLAN     DISPOSITION Decision To Discharge 11/22/2022 10:06:47 PM      PATIENT REFERRED TO:  [address] [place] [id number] [phone]        DISCHARGE MEDICATIONS:  Discharge Medication List as of 11/22/2022 10:31 PM          Nicolás Haynes MD  Emergency Medicine Resident    (Please note that portions of thisnote were completed with a voice recognition program.  Efforts were made to edit the dictations but occasionally words are mis-transcribed.)        Carri Watt MD  Resident  11/25/22 8976

## 2022-11-23 NOTE — DISCHARGE INSTRUCTIONS
Call today or tomorrow to follow up with No primary care provider on file. in 2 days. Use an ice pack or bag filled with ice and apply to the injured area 3 - 4 times a day for 15 - 20 minutes each time. If the injury is older than 3 days, then use a heating pad to help relax the muscles in your back. Use ibuprofen or Tylenol (unless prescribed medications that have Tylenol in it) for pain. You can take over the counter Ibuprofen (advil) tablets (4 tablets every 8 hours or 3 tablets every 6 hours or 2 tablets every 4 hours)    Petros' Flexion Exercises     1. Pelvic tilt. Lie on your back with knees bent, feet flat on floor. Flatten the small of your back against the floor, without pushing down with the legs. Hold for 5 to 10 seconds. 2. Single Knee to chest. Lie on your back with knees bent and feet flat on the floor. Slowly pull your right knee toward your shoulder and hold 5 to 10 seconds. Lower the knee and repeat with the other knee. 3. Double knee to chest. Begin as in the previous exercise. After pulling right knee to chest, pull left knee to chest and hold both knees for 5 to 10 seconds. Slowly lower one leg at a time. 4. Partial sit-up. Do the pelvic tilt (exercise 1) and, while holding this position, slowly curl your head and shoulders off the floor. Hold briefly. Return slowly to the starting position. 5. Hamstring stretch. Start in long sitting with toes directed toward the ceiling and knees fully extended. Slowly lower the trunk forward over the legs, keeping knees extended, arms outstretched over the legs, and eyes focus ahead. 6. Hip Flexor stretch. Place one foot in front of the other with the left (front) knee flexed and the right (back) knee held rigidly straight. Flex forward through the trunk until the left knee contacts the axillary fold (arm pit region). Repeat with right leg forward and left leg back. 7. Squat.  Stand with both feet parallel, about shoulder's width apart. Attempting to maintain the trunk as perpendicular as possible to the floor, eyes focused ahead, and feet flat on the floor, the subject slowly lowers his body by flexing his knees    Return to the Emergency Department for inability to move legs, worsening of pain, tingling / loss of sensation, any other care or concern.

## 2022-11-25 ASSESSMENT — ENCOUNTER SYMPTOMS
BACK PAIN: 1
CHEST TIGHTNESS: 0
COUGH: 0
VOMITING: 0
ABDOMINAL PAIN: 0
TROUBLE SWALLOWING: 0
DIARRHEA: 0
ABDOMINAL DISTENTION: 0
SHORTNESS OF BREATH: 0
CONSTIPATION: 0
SORE THROAT: 0

## 2022-11-26 ENCOUNTER — HOSPITAL ENCOUNTER (EMERGENCY)
Age: 22
Discharge: HOME | End: 2022-11-26
Attending: EMERGENCY MEDICINE
Payer: MEDICAID

## 2022-11-26 VITALS
RESPIRATION RATE: 19 BRPM | SYSTOLIC BLOOD PRESSURE: 128 MMHG | OXYGEN SATURATION: 99 % | TEMPERATURE: 98.4 F | DIASTOLIC BLOOD PRESSURE: 77 MMHG | HEART RATE: 92 BPM

## 2022-11-26 DIAGNOSIS — B34.9 VIRAL ILLNESS: Primary | ICD-10-CM

## 2022-11-26 LAB
SARS-COV-2, RAPID: NOT DETECTED
SPECIMEN DESCRIPTION: NORMAL

## 2022-11-26 PROCEDURE — 99283 EMERGENCY DEPT VISIT LOW MDM: CPT

## 2022-11-26 PROCEDURE — 87635 SARS-COV-2 COVID-19 AMP PRB: CPT

## 2022-11-26 PROCEDURE — 6370000000 HC RX 637 (ALT 250 FOR IP): Performed by: STUDENT IN AN ORGANIZED HEALTH CARE EDUCATION/TRAINING PROGRAM

## 2022-11-26 RX ORDER — IBUPROFEN 800 MG/1
800 TABLET ORAL ONCE
Status: DISCONTINUED | OUTPATIENT
Start: 2022-11-26 | End: 2022-11-27 | Stop reason: HOSPADM

## 2022-11-26 ASSESSMENT — ENCOUNTER SYMPTOMS
SORE THROAT: 1
BACK PAIN: 0
ABDOMINAL PAIN: 0
TROUBLE SWALLOWING: 0
SHORTNESS OF BREATH: 0
VOICE CHANGE: 0
RHINORRHEA: 1
VOMITING: 0
COUGH: 0
NAUSEA: 0

## 2022-11-27 NOTE — ED PROVIDER NOTES
[facility]  ED  Emergency Department Encounter  EmergencyMedicine Resident     Pt Alen Bruner  MRN: [id number]  [place] 2000  Date of evaluation: 11/26/22  PCP:  No primary care provider on file. CHIEF COMPLAINT       Chief Complaint   Patient presents with    Headache     Morethan a month    Generalized Body Aches       HISTORY OF PRESENT ILLNESS  (Location/Symptom, Timing/Onset, Context/Setting, Quality, Duration, Modifying Factors, Severity.)      Von De Jesus is a 23 y.o. female who presents with headache and generalized body aches. She does state that she lives at the shelter. Denies any nausea or vomiting. Does have some fevers and chills. She states that she has taken all of her medications regularly. Patient was recently seen in the department due to fall, was evaluated with imaging. Patient states that her fiancé has similar symptoms. Has not taken any COVID tests. PAST MEDICAL / SURGICAL / SOCIAL / FAMILY HISTORY      has a past medical history of Anxiety, Bipolar 1 disorder ([address] Heart murmur, PTSD (post-traumatic stress disorder), and Thyroid disease. has no past surgical history on file.       Social History     Socioeconomic History    Marital status: Single     Spouse name: Not on file    Number of children: Not on file    Years of education: Not on file    Highest education level: Not on file   Occupational History    Not on file   Tobacco Use    Smoking status: Never    Smokeless tobacco: Never   Substance and Sexual Activity    Alcohol use: Not Currently    Drug use: Not Currently    Sexual activity: Not on file   Other Topics Concern    Not on file   Social History Narrative    Not on file     Social Determinants of Health     Financial Resource Strain: Not on file   Food Insecurity: Not on file   Transportation Needs: Not on file   Physical Activity: Not on file   Stress: Not on file   Social Connections: Not on file   Intimate Partner External ear normal.      Left Ear: External ear normal.   Eyes:      Extraocular Movements: Extraocular movements intact. Pupils: Pupils are equal, round, and reactive to light. Cardiovascular:      Rate and Rhythm: Normal rate. Pulses: Normal pulses. Pulmonary:      Effort: Pulmonary effort is normal.      Breath sounds: Normal breath sounds. Abdominal:      Palpations: Abdomen is soft. Tenderness: There is no abdominal tenderness. Musculoskeletal:         General: Normal range of motion. Cervical back: Normal range of motion. Skin:     Capillary Refill: Capillary refill takes less than 2 seconds. Neurological:      General: No focal deficit present. Mental Status: She is alert and oriented to person, place, and time. Psychiatric:         Mood and Affect: Mood normal.       DIFFERENTIAL  DIAGNOSIS     PLAN (LABS / IMAGING / EKG):  Orders Placed This Encounter   Procedures    COVID-19, Rapid       MEDICATIONS ORDERED:  Orders Placed This Encounter   Medications    ibuprofen (ADVIL;MOTRIN) tablet 800 mg       DDX: Viral illness, COVID-19, influenza    MDM: 23 y.o. female presents today with viral symptoms. Patient also states that she has a headache that she has not taken anything for. Denies any neurologic symptoms. We will treat pain with Motrin, rapid COVID obtained. COVID-negative, will provide symptomatic treatment and discharge.     EMERGENCY DEPARTMENT COURSE:  ED Course as of 11/26/22 2336   Sat Nov 26, 2022   3295 SARS-CoV-2, Rapid: Not Detected [SS]      ED Course User Index  [SS] Vicky Demarco MD            [place] Coma Scale  Eye Opening: Spontaneous  Best Verbal Response: Oriented  Best Motor Response: Obeys commands  [place] Coma Scale Score: 15  DIAGNOSTIC RESULTS / EMERGENCY DEPARTMENT COURSE / MDM   LAB RESULTS:  Results for orders placed or performed during the hospital encounter of 11/26/22   COVID-19, Rapid    Specimen: Nasopharyngeal Swab   Result Value Ref Range    Specimen Description . NASOPHARYNGEAL SWAB     SARS-CoV-2, Rapid Not Detected Not Detected           RADIOLOGY:  No orders to display            PROCEDURES:  None    CONSULTS:  None    CRITICAL CARE:  There was significant risk of life threatening deterioration of patient's condition requiring my direct management. Critical care time 0 minutes, excluding any documented procedures. FINAL IMPRESSION      1.  Viral illness          DISPOSITION / PLAN     DISPOSITION Decision To Discharge 11/26/2022 10:55:22 PM      PATIENT REFERRED TO:  [address] [place] [id number] [phone]        DISCHARGE MEDICATIONS:  Discharge Medication List as of 11/26/2022 10:55 PM          Bisi Varma MD  Emergency Medicine Resident    (Please note that portions of thisnote were completed with a voice recognition program.  Efforts were made to edit the dictations but occasionally words are mis-transcribed.)       Bisi Varma MD  Resident  11/26/22 9465

## 2022-11-27 NOTE — ED NOTES
Pt to ED complaining of headache, body aches and loss of taste for a month. Pt denies any medical history at all, denies taking any medication. Pt is A&OX4. VS taken and recorded. All needs attended. Will cont plan of care.      Andres Romeo RN  11/26/22 0630

## 2022-11-27 NOTE — DISCHARGE INSTRUCTIONS
Call today or tomorrow to follow up with No primary care provider on file. in 2 days. Drink plenty of water, gatorade, juice. Avoid drinking alcohol. Use ibuprofen or Tylenol (unless prescribed medications that have Tylenol in it) for pain. You can take over the counter Ibuprofen (advil) tablets (4 every 8 hours or 3 every 6 hours or 2 every 4 hours)    Return to the Emergency Department for worsening of cough, fever > 101.5 and not controlled with Tylenol or Ibuprofen, excessive nausea or vomiting, worsening of nasal discharge, any other care or concern.

## 2022-11-27 NOTE — ED PROVIDER NOTES
[facility] [address] ED     Emergency Department     Faculty Attestation        I performed a history and physical examination of the patient and discussed management with the resident. I reviewed the residents note and agree with the documented findings and plan of care. Any areas of disagreement are noted on the chart. I was personally present for the key portions of any procedures. I have documented in the chart those procedures where I was not present during the key portions. I have reviewed the emergency nurses triage note. I agree with the chief complaint, past medical history, past surgical history, allergies, medications, social and family history as documented unless otherwise noted below. For Physician Assistant/ Nurse Practitioner cases/documentation I have personally evaluated this patient and have completed at least one if not all key elements of the E/M (history, physical exam, and MDM). Additional findings are as noted. PCP:  No primary care provider on file. Pertinent Comments:     Patient is a 32-year-old female here with headache as well as some generalized body aches and loss of taste. Denies any chest pain or shortness of breath and there is no abdominal pain or vomiting/diarrhea. Lungs are clear to auscultation bilateral with midline trachea heart is regular rate and rhythm and abdomen soft/nontender. HEENT examination demonstrates clear nasal rhinorrhea and congestion with posterior pharynx mildly injected but no exudates and a normal midline uvula. Assessment/plan: Patient with viral URI will obtain COVID swab and reevaluate after attempted symptomatic control    Critical Care  None      (Please note that portions of this note were completed with a voice recognition program. Efforts were made to edit the dictations but occasionally words are mis-transcribed.  Whenever words are used in this note in any gender, they shall be construed as though they were used in the gender appropriate to the circumstances; and whenever words are used in this note in the singular or plural form, they shall be construed as though they were used in the form appropriate to the circumstances.)    Zachary Kurtz MD [facility]  Attending Emergency Medicine Physician           Joyce Hale MD  11/26/22 2115       Joyce Hale MD  11/26/22 1199

## 2022-12-16 ENCOUNTER — APPOINTMENT (OUTPATIENT)
Dept: CT IMAGING | Age: 22
End: 2022-12-16
Payer: MEDICAID

## 2022-12-16 ENCOUNTER — APPOINTMENT (OUTPATIENT)
Dept: GENERAL RADIOLOGY | Age: 22
End: 2022-12-16
Payer: MEDICAID

## 2022-12-16 ENCOUNTER — HOSPITAL ENCOUNTER (EMERGENCY)
Age: 22
Discharge: HOME | End: 2022-12-16
Attending: EMERGENCY MEDICINE
Payer: MEDICAID

## 2022-12-16 VITALS
RESPIRATION RATE: 21 BRPM | DIASTOLIC BLOOD PRESSURE: 54 MMHG | HEART RATE: 65 BPM | WEIGHT: 150 LBS | SYSTOLIC BLOOD PRESSURE: 102 MMHG | OXYGEN SATURATION: 93 % | BODY MASS INDEX: 25.75 KG/M2 | TEMPERATURE: 96.9 F

## 2022-12-16 DIAGNOSIS — Z71.1 NO PROBLEM, FEARED COMPLAINT UNFOUNDED: Primary | ICD-10-CM

## 2022-12-16 DIAGNOSIS — R68.89 ABNORMAL SENSORY EXAM: ICD-10-CM

## 2022-12-16 LAB
ABSOLUTE EOS #: 0.21 K/UL (ref 0–0.44)
ABSOLUTE IMMATURE GRANULOCYTE: 0.05 K/UL (ref 0–0.3)
ABSOLUTE LYMPH #: 4.24 K/UL (ref 1.2–5.2)
ABSOLUTE MONO #: 0.72 K/UL (ref 0.1–1.4)
ACETAMINOPHEN LEVEL: <5 UG/ML (ref 10–30)
ALBUMIN SERPL-MCNC: 4.3 G/DL (ref 3.5–5.2)
ALBUMIN/GLOBULIN RATIO: 1.1 (ref 1–2.5)
ALP BLD-CCNC: 92 U/L (ref 35–104)
ALT SERPL-CCNC: 32 U/L (ref 5–33)
ANION GAP SERPL CALCULATED.3IONS-SCNC: 12 MMOL/L (ref 9–17)
AST SERPL-CCNC: 26 U/L
BASOPHILS # BLD: 0 % (ref 0–2)
BASOPHILS ABSOLUTE: 0.04 K/UL (ref 0–0.2)
BILIRUB SERPL-MCNC: <0.1 MG/DL (ref 0.3–1.2)
BILIRUBIN DIRECT: <0.1 MG/DL
BILIRUBIN, INDIRECT: ABNORMAL MG/DL (ref 0–1)
BUN BLDV-MCNC: 9 MG/DL (ref 6–20)
CALCIUM SERPL-MCNC: 11 MG/DL (ref 8.6–10.4)
CHLORIDE BLD-SCNC: 101 MMOL/L (ref 98–107)
CO2: 25 MMOL/L (ref 20–31)
CREAT SERPL-MCNC: 0.52 MG/DL (ref 0.5–0.9)
EOSINOPHILS RELATIVE PERCENT: 2 % (ref 1–4)
ETHANOL PERCENT: <0.01 %
ETHANOL: <10 MG/DL
GFR SERPL CREATININE-BSD FRML MDRD: >60 ML/MIN/1.73M2
GLUCOSE BLD-MCNC: 109 MG/DL (ref 70–99)
HCG QUALITATIVE: NEGATIVE
HCT VFR BLD CALC: 37.1 % (ref 36.3–47.1)
HEMOGLOBIN: 12.1 G/DL (ref 11.9–15.1)
IMMATURE GRANULOCYTES: 0 %
LACTIC ACID, WHOLE BLOOD: 1.3 MMOL/L (ref 0.7–2.1)
LYMPHOCYTES # BLD: 34 % (ref 25–45)
MCH RBC QN AUTO: 26.1 PG (ref 25.2–33.5)
MCHC RBC AUTO-ENTMCNC: 32.6 G/DL (ref 28.4–34.8)
MCV RBC AUTO: 80.1 FL (ref 82.6–102.9)
MONOCYTES # BLD: 6 % (ref 2–8)
NRBC AUTOMATED: 0 PER 100 WBC
PDW BLD-RTO: 14.5 % (ref 11.8–14.4)
PLATELET # BLD: 340 K/UL (ref 138–453)
PMV BLD AUTO: 9.5 FL (ref 8.1–13.5)
POTASSIUM SERPL-SCNC: 3.9 MMOL/L (ref 3.7–5.3)
RBC # BLD: 4.63 M/UL (ref 3.95–5.11)
RBC # BLD: ABNORMAL 10*6/UL
SALICYLATE LEVEL: <1 MG/DL (ref 3–10)
SEG NEUTROPHILS: 58 % (ref 34–64)
SEGMENTED NEUTROPHILS ABSOLUTE COUNT: 7.32 K/UL (ref 1.8–8)
SODIUM BLD-SCNC: 138 MMOL/L (ref 135–144)
TOTAL PROTEIN: 8.1 G/DL (ref 6.4–8.3)
TOXIC TRICYCLIC SC,BLOOD: NEGATIVE
TSH SERPL DL<=0.05 MIU/L-ACNC: 1.28 UIU/ML (ref 0.3–5)
WBC # BLD: 12.6 K/UL (ref 4.5–13.5)

## 2022-12-16 PROCEDURE — 84703 CHORIONIC GONADOTROPIN ASSAY: CPT

## 2022-12-16 PROCEDURE — 71045 X-RAY EXAM CHEST 1 VIEW: CPT

## 2022-12-16 PROCEDURE — 80076 HEPATIC FUNCTION PANEL: CPT

## 2022-12-16 PROCEDURE — G0480 DRUG TEST DEF 1-7 CLASSES: HCPCS

## 2022-12-16 PROCEDURE — 84443 ASSAY THYROID STIM HORMONE: CPT

## 2022-12-16 PROCEDURE — 80307 DRUG TEST PRSMV CHEM ANLYZR: CPT

## 2022-12-16 PROCEDURE — 80179 DRUG ASSAY SALICYLATE: CPT

## 2022-12-16 PROCEDURE — 83605 ASSAY OF LACTIC ACID: CPT

## 2022-12-16 PROCEDURE — 80143 DRUG ASSAY ACETAMINOPHEN: CPT

## 2022-12-16 PROCEDURE — 70450 CT HEAD/BRAIN W/O DYE: CPT

## 2022-12-16 PROCEDURE — 99284 EMERGENCY DEPT VISIT MOD MDM: CPT

## 2022-12-16 PROCEDURE — 85025 COMPLETE CBC W/AUTO DIFF WBC: CPT

## 2022-12-16 PROCEDURE — 80048 BASIC METABOLIC PNL TOTAL CA: CPT

## 2022-12-16 ASSESSMENT — ENCOUNTER SYMPTOMS
NAUSEA: 0
SHORTNESS OF BREATH: 1
VOICE CHANGE: 0
COUGH: 1
ABDOMINAL DISTENTION: 0
TROUBLE SWALLOWING: 0
VOMITING: 0
EYES NEGATIVE: 1
APNEA: 0

## 2022-12-17 NOTE — ED TRIAGE NOTES
PT Presents to ED with c/o saying she is in the middle of a black out . PT states she  became confused about 20 mins ago.

## 2022-12-17 NOTE — DISCHARGE INSTRUCTIONS
Thank you for visiting [facility] Emergency Department. You need to call No primary care provider on file. to make an appointment as directed for follow up. Should you have any questions regarding your care or further treatment, please call [facility] at [phone].

## 2022-12-17 NOTE — ED PROVIDER NOTES
[facility] ED  eMERGENCY dEPARTMENT eNCOUnter   Attending Attestation     Pt Name: Rickie Simmons  MRN: [id number]  [place] 2000  Date of evaluation: 12/16/22       Rickie Simmons is a 23 y.o. female who presents with Altered Mental Status      History: Patient presents with altered mental status per the patient. Her and her boyfriend state that she has been confused and not knowing what day it was but then also knowing what that was. Patient says that she cannot feel anything but then has pain with any touch everywhere over her body. Exam: Heart rate and rhythm are regular. Lungs are clear to auscultation bilaterally. Abdomen is soft with diffuse tenderness. Patient has normal sensation on my exam.  Patient feels pain without difficulty. Babinski is downgoing. Patient is moving and withdrawing to any sort of mild pain stimulus. Patient said I will when I pinched her little toe. Patient states that she thinks she might of felt something. I then asked her if she is having improving sensation. Patient says that she is having improving sensation after that. Patient is having improving sensation upper extremities as well based on her boyfriend touching her hand and her being startled by the touch. Patient had significant work-up including CT of the brain, lab work. No significant abnormalities noted. Patient is improved and acting more appropriately. Concern for some psychiatric or behavioral issue that is contributing to her complaints. Given abatement of symptoms, will plan for discharge. I performed a history and physical examination of the patient and discussed management with the resident. I reviewed the residents note and agree with the documented findings and plan of care. Any areas of disagreement are noted on the chart. I was personally present for the key portions of any procedures.  I have documented in the chart those procedures where I was not present during the key portions. I have personally reviewed all images and agree with the resident's interpretation. I have reviewed the emergency nurses triage note. I agree with the chief complaint, past medical history, past surgical history, allergies, medications, social and family history as documented unless otherwise noted below. Documentation of the HPI, Physical Exam and Medical Decision Making performed by medical students or scribes is based on my personal performance of the HPI, PE and MDM. For Phys Assistant/ Nurse Practitioner cases/documentation I have had a face to face evaluation of this patient and have completed at least one if not all key elements of the E/M (history, physical exam, and MDM). Additional findings are as noted. For APC cases I have personally evaluated and examined the patient in conjunction with the APC and agree with the treatment plan and disposition of the patient as recorded by the APC.     Nieves Moreno MD  Attending Emergency  Physician       Royer Manriquez MD  12/16/22 7919

## 2022-12-17 NOTE — ED PROVIDER NOTES
[facility]  ED  Emergency Department Encounter  Emergency Medicine Resident     Pt Name: Archie Ramos  YEM:[id number]  [place] 2000  Date of evaluation: 12/16/22  PCP:  No primary care provider on file. CHIEF COMPLAINT       Chief Complaint   Patient presents with    Altered Mental Status       HISTORY OF PRESENT ILLNESS  (Location/Symptom, Timing/Onset, Context/Setting, Quality, Duration, ModifyingFactors, Severity.)      Archie Ramos is a 23 y.o. female presents to the emergency department for evaluation. Patient was able to ambulate to her bed, get in and in speaking with the nurse stated that she is in the middle of a \"blackout. Patient is able to tell where she is, the date, the people that are with her and states that there has been an individual with her and knows who it is. States this has happened in the past.  On evaluation by physician staff. Patient reiterates that she is in the middle of a blackout, take several different medications and begin to warn her significant other that something was happening. Patient now complaining of new numbness in her bilateral legs and bilateral hands and now of some shortness of breath. Patient also states that she is having difficulty remembering people's names. Patient denies any traumatic injury, history of seizures or ingestion or injection of any kind. PAST MEDICAL / SURGICAL / SOCIAL /FAMILY HISTORY      has a past medical history of Anxiety, Bipolar 1 disorder ([address] Heart murmur, PTSD (post-traumatic stress disorder), and Thyroid disease. No other pertinent PMH on review with patient/guardian. has no past surgical history on file. No other pertinent PSH on review with patient/guardian.   Social History     Socioeconomic History    Marital status: Single     Spouse name: Not on file    Number of children: Not on file    Years of education: Not on file    Highest education level: Not on file   Occupational History Not on file   Tobacco Use    Smoking status: Never    Smokeless tobacco: Never   Substance and Sexual Activity    Alcohol use: Not Currently    Drug use: Not Currently    Sexual activity: Not on file   Other Topics Concern    Not on file   Social History Narrative    Not on file     Social Determinants of Health     Financial Resource Strain: Not on file   Food Insecurity: Not on file   Transportation Needs: Not on file   Physical Activity: Not on file   Stress: Not on file   Social Connections: Not on file   Intimate Partner Violence: Not on file   Housing Stability: Not on file       I counseled the patient against using tobacco products. No family history on file. No other pertinent FamHx on review with patient/guardian. Allergies:  Penicillins and Vyvanse [lisdexamfetamine]    Home Medications:  Prior to Admission medications    Medication Sig Start Date End Date Taking? Authorizing Provider   hydrOXYzine HCl (ATARAX) 25 MG tablet TAKE 1 TABLET BY MOUTH THREE TIMES DAILY 8/19/22   Historical Provider, MD   levothyroxine (SYNTHROID) 50 MCG tablet TAKE 1 TABLET BY MOUTH EVERY DAY 8/15/22   Historical Provider, MD   traZODone (DESYREL) 100 MG tablet  8/24/22   Historical Provider, MD   QUEtiapine (SEROQUEL) 100 MG tablet TAKE 1 TABLET BY MOUTH EVERY DAY AT BEDTIME 7/30/22   Historical Provider, MD   sertraline (ZOLOFT) 100 MG tablet Take 100 mg by mouth daily Pt takes 1.5 tab per day  150mg q morning    Historical Provider, MD   apixaban starter pack (ELIQUIS DVT/PE STARTER PACK) 5 MG TBPK tablet Take 1 tablet by mouth See Admin Instructions 7/28/22   Assumkimberly Shaw MD       REVIEW OF SYSTEMS    (2-9 systems for level 4, 10 ormore for level 5)      Review of Systems   Constitutional:  Negative for activity change and appetite change. HENT:  Negative for congestion, tinnitus, trouble swallowing and voice change. Eyes: Negative. Respiratory:  Positive for cough and shortness of breath.  Negative for apnea. Cardiovascular:  Negative for chest pain. Gastrointestinal:  Negative for abdominal distention, nausea and vomiting. Endocrine: Negative for cold intolerance and heat intolerance. Genitourinary:  Negative for dysuria and urgency. Musculoskeletal:  Positive for gait problem. Negative for arthralgias, neck pain and neck stiffness. Skin: Negative. Allergic/Immunologic: Negative for immunocompromised state. Neurological:  Positive for speech difficulty, weakness, light-headedness and numbness. Negative for dizziness, seizures, facial asymmetry and headaches. Psychiatric/Behavioral:  Positive for confusion and decreased concentration. Negative for agitation and behavioral problems. The patient is nervous/anxious. PHYSICAL EXAM   (up to 7 for level 4, 8 or more for level 5)      INITIAL VITALS:   BP (!) 102/54   Pulse 65   Temp 96.9 °F (36.1 °C) (Oral)   Resp 21   Wt 150 lb (68 kg)   SpO2 93%   BMI 25.75 kg/m²     Physical Exam  Vitals reviewed. Constitutional:       Appearance: She is well-developed. HENT:      Head: Normocephalic and atraumatic. Mouth/Throat:      Mouth: Mucous membranes are moist.      Pharynx: Oropharynx is clear. Eyes:      General: No visual field deficit. Extraocular Movements: Extraocular movements intact. Pupils: Pupils are equal, round, and reactive to light. Neck:      Meningeal: Brudzinski's sign absent. Cardiovascular:      Rate and Rhythm: Normal rate and regular rhythm. Heart sounds: Normal heart sounds. Pulmonary:      Effort: Pulmonary effort is normal.      Breath sounds: Normal breath sounds. Abdominal:      General: Bowel sounds are normal.      Palpations: Abdomen is soft. Musculoskeletal:         General: Normal range of motion. Cervical back: Normal range of motion and neck supple. No rigidity. Skin:     General: Skin is warm and dry. Capillary Refill: Capillary refill takes less than 2 seconds. Neurological:      Mental Status: She is alert. She is disoriented and confused. GCS: GCS eye subscore is 4. GCS verbal subscore is 5. GCS motor subscore is 6. Cranial Nerves: No cranial nerve deficit, dysarthria or facial asymmetry. Sensory: Sensory deficit present. Motor: Weakness present. Deep Tendon Reflexes: Reflexes normal. Babinski sign absent on the right side. Babinski sign absent on the left side. Psychiatric:         Mood and Affect: Mood is anxious. DIFFERENTIAL  DIAGNOSIS     DDX: Acute psychosis, seizure, metabolic encephalopathy    PLAN (LABS / IMAGING / EKG):  Orders Placed This Encounter   Procedures    CT Head W/O Contrast    XR CHEST PORTABLE    TOX SCR, BLD, ED    CBC with Auto Differential    BMP    Hepatic Function Panel    HCG Qualitative, Serum    Lactic Acid    TSH       MEDICATIONS ORDERED:  No orders of the defined types were placed in this encounter.           DIAGNOSTIC RESULTS / EMERGENCY DEPARTMENT COURSE / MDM     LABS:  Results for orders placed or performed during the hospital encounter of 12/16/22   TOX SCR, BLD, ED   Result Value Ref Range    Acetaminophen Level <5 (L) 10 - 30 ug/mL    Ethanol <10 <10 mg/dL    Ethanol percent <1.076 <3.651 %    Salicylate Lvl <1 (L) 3 - 10 mg/dL    Toxic Tricyclic Sc,Blood NEGATIVE NEGATIVE   CBC with Auto Differential   Result Value Ref Range    WBC 12.6 4.5 - 13.5 k/uL    RBC 4.63 3.95 - 5.11 m/uL    Hemoglobin 12.1 11.9 - 15.1 g/dL    Hematocrit 37.1 36.3 - 47.1 %    MCV 80.1 (L) 82.6 - 102.9 fL    MCH 26.1 25.2 - 33.5 pg    MCHC 32.6 28.4 - 34.8 g/dL    RDW 14.5 (H) 11.8 - 14.4 %    Platelets 207 784 - 139 k/uL    MPV 9.5 8.1 - 13.5 fL    NRBC Automated 0.0 0.0 per 100 WBC    Seg Neutrophils 58 34 - 64 %    Lymphocytes 34 25 - 45 %    Monocytes 6 2 - 8 %    Eosinophils % 2 1 - 4 %    Basophils 0 0 - 2 %    Immature Granulocytes 0 0 %    Segs Absolute 7.32 1.80 - 8.00 k/uL    Absolute Lymph # 4.24 1.20 - 5.20 k/uL    Absolute Mono # 0.72 0.10 - 1.40 k/uL    Absolute Eos # 0.21 0.00 - 0.44 k/uL    Basophils Absolute 0.04 0.00 - 0.20 k/uL    Absolute Immature Granulocyte 0.05 0.00 - 0.30 k/uL    RBC Morphology ANISOCYTOSIS PRESENT    BMP   Result Value Ref Range    Glucose 109 (H) 70 - 99 mg/dL    BUN 9 6 - 20 mg/dL    Creatinine 0.52 0.50 - 0.90 mg/dL    Est, Glom Filt Rate >60 >60 mL/min/1.73m2    Calcium 11.0 (H) 8.6 - 10.4 mg/dL    Sodium 138 135 - 144 mmol/L    Potassium 3.9 3.7 - 5.3 mmol/L    Chloride 101 98 - 107 mmol/L    CO2 25 20 - 31 mmol/L    Anion Gap 12 9 - 17 mmol/L   Hepatic Function Panel   Result Value Ref Range    Albumin 4.3 3.5 - 5.2 g/dL    Alkaline Phosphatase 92 35 - 104 U/L    ALT 32 5 - 33 U/L    AST 26 <32 U/L    Total Bilirubin <0.1 (L) 0.3 - 1.2 mg/dL    Bilirubin, Direct <0.1 <0.3 mg/dL    Bilirubin, Indirect Can not be calculated 0.0 - 1.0 mg/dL    Total Protein 8.1 6.4 - 8.3 g/dL    Albumin/Globulin Ratio 1.1 1.0 - 2.5   HCG Qualitative, Serum   Result Value Ref Range    hCG Qual NEGATIVE NEGATIVE   Lactic Acid   Result Value Ref Range    Lactic Acid, Whole Blood 1.3 0.7 - 2.1 mmol/L   TSH   Result Value Ref Range    TSH 1.28 0.30 - 5.00 uIU/mL         IMPRESSION/MDM/ED COURSE:  23 y.o. female presented with cluster of symptoms that are continuing to evolve and change during the patient's stay. Patient initially able to walk, ambulate having no difficulty is now states that she is inability to walk, numbness and tingling of her hands and legs and feet. Patient seen from nursing station moving all of her extremities without difficulty. However given her given patient's symptoms will perform CT head, broad metabolic work-up to ensure there is no encephalopathy or ingestion of toxic materials for evaluation of possible seizures versus metabolic encephalopathy.     ED Course as of 12/17/22 0626   Fri Dec 16, 2022   2320 On reevaluation by attending physician, patient symptoms greatly improved, since sensation, vision and mentation improving. We will have the patient walk around the unit to ensure that she is safe for discharge home with follow-up with PCP as needed [ES]      ED Course User Index  [ES] Cristopher Farley MD        Patient/Guardian requesting discharge. Patient/Guardian was given written and verbal instructions prior to discharge. Patient/Guardian understood and agreed. Patient/Guardian had no further questions. RADIOLOGY:  CT Head W/O Contrast   Final Result   No acute intracranial abnormality. Extensive bilateral paranasal sinus   disease. XR CHEST PORTABLE   Final Result   No acute cardiopulmonary process demonstrated. There is mild pulmonary   hypoventilation. Stable normal cardiac size, without evidence of cardiac decompensation. EKG  None    All EKG's are interpreted by the Emergency Department Physician who either signs or Co-signs this chart in the absence of a cardiologist.      PROCEDURES:  None    CONSULTS:  None        FINAL IMPRESSION      1. No problem, feared complaint unfounded    2.  Abnormal sensory exam          DISPOSITION / PLAN       DISPOSITION Decision To Discharge 12/16/2022 11:22:19 PM        PATIENT REFERREDTO:  [facility]  [address] [place] [address]  [phone]  Go to   As needed    DISCHARGE MEDICATIONS:  Discharge Medication List as of 12/16/2022 11:43 PM          Cristopher Farley MD  PGY 3  Resident Physician Emergency Medicine  12/17/22 6:26 AM        (Please note that portions of this note were completed with a voice recognition program.Efforts were made to edit the dictations but occasionally words are mis-transcribed.)       Cristopher Farley MD  Resident  12/17/22 6832

## 2022-12-24 ENCOUNTER — APPOINTMENT (OUTPATIENT)
Dept: GENERAL RADIOLOGY | Age: 22
End: 2022-12-24
Payer: MEDICAID

## 2022-12-24 ENCOUNTER — HOSPITAL ENCOUNTER (EMERGENCY)
Age: 22
Discharge: HOME | End: 2022-12-24
Attending: EMERGENCY MEDICINE
Payer: MEDICAID

## 2022-12-24 VITALS
DIASTOLIC BLOOD PRESSURE: 95 MMHG | TEMPERATURE: 97 F | OXYGEN SATURATION: 95 % | HEART RATE: 101 BPM | RESPIRATION RATE: 16 BRPM | SYSTOLIC BLOOD PRESSURE: 138 MMHG

## 2022-12-24 VITALS
SYSTOLIC BLOOD PRESSURE: 122 MMHG | DIASTOLIC BLOOD PRESSURE: 70 MMHG | HEART RATE: 98 BPM | OXYGEN SATURATION: 98 % | TEMPERATURE: 97.7 F | RESPIRATION RATE: 20 BRPM

## 2022-12-24 DIAGNOSIS — M25.551 RIGHT HIP PAIN: Primary | ICD-10-CM

## 2022-12-24 DIAGNOSIS — Z32.02 PREGNANCY TEST NEGATIVE: Primary | ICD-10-CM

## 2022-12-24 LAB
CHP ED QC CHECK: YES
PREGNANCY TEST URINE, POC: NEGATIVE

## 2022-12-24 PROCEDURE — 6370000000 HC RX 637 (ALT 250 FOR IP): Performed by: STUDENT IN AN ORGANIZED HEALTH CARE EDUCATION/TRAINING PROGRAM

## 2022-12-24 PROCEDURE — 99283 EMERGENCY DEPT VISIT LOW MDM: CPT

## 2022-12-24 PROCEDURE — 73502 X-RAY EXAM HIP UNI 2-3 VIEWS: CPT

## 2022-12-24 RX ORDER — ONDANSETRON 4 MG/1
4 TABLET, FILM COATED ORAL EVERY 8 HOURS PRN
Qty: 10 TABLET | Refills: 0 | Status: SHIPPED | OUTPATIENT
Start: 2022-12-24

## 2022-12-24 RX ORDER — ONDANSETRON 4 MG/1
4 TABLET, ORALLY DISINTEGRATING ORAL ONCE
Status: COMPLETED | OUTPATIENT
Start: 2022-12-24 | End: 2022-12-24

## 2022-12-24 RX ORDER — ACETAMINOPHEN 500 MG
1000 TABLET ORAL ONCE
Status: COMPLETED | OUTPATIENT
Start: 2022-12-24 | End: 2022-12-24

## 2022-12-24 RX ORDER — IBUPROFEN 400 MG/1
400 TABLET ORAL EVERY 8 HOURS PRN
Qty: 20 TABLET | Refills: 1 | Status: SHIPPED | OUTPATIENT
Start: 2022-12-24

## 2022-12-24 RX ADMIN — ACETAMINOPHEN 1000 MG: 500 TABLET ORAL at 16:20

## 2022-12-24 RX ADMIN — ONDANSETRON 4 MG: 4 TABLET, ORALLY DISINTEGRATING ORAL at 16:20

## 2022-12-24 ASSESSMENT — ENCOUNTER SYMPTOMS
VOICE CHANGE: 0
EYE DISCHARGE: 0
TROUBLE SWALLOWING: 0
SHORTNESS OF BREATH: 0
COLOR CHANGE: 0
APNEA: 0
ABDOMINAL PAIN: 0
EYES NEGATIVE: 1
ABDOMINAL DISTENTION: 0
CHEST TIGHTNESS: 0
DIARRHEA: 0
APNEA: 0
BACK PAIN: 1
CONSTIPATION: 0
BACK PAIN: 0
EYE ITCHING: 0
NAUSEA: 0
ABDOMINAL DISTENTION: 0
WHEEZING: 0
DIARRHEA: 0
CONSTIPATION: 0
VOMITING: 0

## 2022-12-24 ASSESSMENT — PAIN SCALES - GENERAL: PAINLEVEL_OUTOF10: 10

## 2022-12-24 ASSESSMENT — PAIN DESCRIPTION - LOCATION: LOCATION: HIP

## 2022-12-24 ASSESSMENT — PAIN DESCRIPTION - ORIENTATION: ORIENTATION: RIGHT

## 2022-12-24 ASSESSMENT — PAIN - FUNCTIONAL ASSESSMENT: PAIN_FUNCTIONAL_ASSESSMENT: 0-10

## 2022-12-24 NOTE — ED PROVIDER NOTES
[facility]     Emergency Department     Faculty Attestation    I performed a history and physical examination of the patient and discussed management with the resident. I reviewed the residents note and agree with the documented findings including all diagnostic interpretations and plan of care. Any areas of disagreement are noted on the chart. I was personally present for the key portions of any procedures. I have documented in the chart those procedures where I was not present during the key portions. I have reviewed the emergency nurses triage note. I agree with the chief complaint, past medical history, past surgical history, allergies, medications, social and family history as documented unless otherwise noted below. Documentation of the HPI, Physical Exam and Medical Decision Making performed by scribyesi is based on my personal performance of the HPI, PE and MDM. For Physician Assistant/ Nurse Practitioner cases/documentation I have personally evaluated this patient and have completed at least one if not all key elements of the E/M (history, physical exam, and MDM). Additional findings are as noted. Primary Care Physician: No primary care provider on file. History: This is a 23 y.o. female who presents to the Emergency Department with complaint of body aches, headaches. Started earlier while she was in the lobby at the rewarming station. Recent large work-up performed last week for difficulty localize neurologic complaints that was negative    Physical:     oral temperature is 97.7 °F (36.5 °C). Her blood pressure is 122/70 and her pulse is 98.  Her respiration is 20 and oxygen saturation is 98%.    23 y.o. female no acute distress, cardiac exam regular rate and rhythm no murmurs rubs gallops, pulmonary clear bilaterally    Impression: Viral illness    Plan: Symptomatic treatment, hCG    Abimael Baker MD, [facility]  Attending Emergency Physician        Cecily Thorpe MD  12/24/22 4905

## 2022-12-24 NOTE — DISCHARGE INSTRUCTIONS
Thank you for visiting [facility]. You need to call No primary care provider on file. to make an appointment as directed for follow up. Should you have any questions regarding your care or further treatment, please call [facility] at [phone].

## 2022-12-24 NOTE — ED NOTES
Pt came to ed via triage d/t body pain, general illness, concern for pregnancy. Pt states she had sexual intercourse w no protection a day after period ended. Pt also states her body hurts and would like to be checked out. Pt is homeless but lives at [facility]. VSS, NAD, AOx4.  Pt resting in bed with call light in reach no verbalized needs at this time        Claribel Barnes RN  12/24/22 7566

## 2022-12-24 NOTE — ED PROVIDER NOTES
[facility]  ED  Emergency Department Encounter  Emergency Medicine Resident     Pt Name: Sita Jorgensen  IDV:[id number]  [place] 2000  Date of evaluation: 12/24/22  PCP:  No primary care provider on file. CHIEF COMPLAINT       Chief Complaint   Patient presents with    Illness    Pregnancy Test       HISTORY OF PRESENT ILLNESS  (Location/Symptom, Timing/Onset, Context/Setting, Quality, Duration, ModifyingFactors, Severity.)      Sita Jorgensen is a 23 y.o. female with complaints of full body pain, new numbness in her legs and tops of her feet, changes in her mental status and a feeling of just feeling sick. Patient denies any recent viral illnesses, sicknesses, trauma, falls, seizure-like activity. Patient states that she was concerned that she may have frostbite or pregnancy which is what brought her to the emerge apartment today  [facility] / SURGICAL / SOCIAL /FAMILY HISTORY      has a past medical history of Anxiety, Bipolar 1 disorder ([address] Heart murmur, PTSD (post-traumatic stress disorder), and Thyroid disease. No other pertinent PMH on review with patient/guardian. has no past surgical history on file. No other pertinent PSH on review with patient/guardian.   Social History     Socioeconomic History    Marital status: Single     Spouse name: Not on file    Number of children: Not on file    Years of education: Not on file    Highest education level: Not on file   Occupational History    Not on file   Tobacco Use    Smoking status: Never    Smokeless tobacco: Never   Substance and Sexual Activity    Alcohol use: Not Currently    Drug use: Not Currently    Sexual activity: Not on file   Other Topics Concern    Not on file   Social History Narrative    Not on file     Social Determinants of Health     Financial Resource Strain: Not on file   Food Insecurity: Not on file   Transportation Needs: Not on file   Physical Activity: Not on file   Stress: Not on file   Social Connections: Not on file   Intimate Partner Violence: Not on file   Housing Stability: Not on file       I counseled the patient against using tobacco products. No family history on file. No other pertinent FamHx on review with patient/guardian. Allergies:  Penicillins and Vyvanse [lisdexamfetamine]    Home Medications:  Prior to Admission medications    Medication Sig Start Date End Date Taking? Authorizing Provider   ondansetron (ZOFRAN) 4 MG tablet Take 1 tablet by mouth every 8 hours as needed for Nausea 12/24/22  Yes Elin Dickey MD   hydrOXYzine HCl (ATARAX) 25 MG tablet TAKE 1 TABLET BY MOUTH THREE TIMES DAILY 8/19/22   Historical Provider, MD   levothyroxine (SYNTHROID) 50 MCG tablet TAKE 1 TABLET BY MOUTH EVERY DAY 8/15/22   Historical Provider, MD   traZODone (DESYREL) 100 MG tablet  8/24/22   Historical Provider, MD   QUEtiapine (SEROQUEL) 100 MG tablet TAKE 1 TABLET BY MOUTH EVERY DAY AT BEDTIME 7/30/22   Historical Provider, MD   sertraline (ZOLOFT) 100 MG tablet Take 100 mg by mouth daily Pt takes 1.5 tab per day  150mg q morning    Historical Provider, MD   apixaban starter pack (ELIQUIS DVT/PE STARTER PACK) 5 MG TBPK tablet Take 1 tablet by mouth See Admin Instructions 7/28/22   Linsey Villalta MD       REVIEW OF SYSTEMS    (2-9 systems for level 4, 10 ormore for level 5)      Review of Systems   Constitutional:  Negative for activity change, fatigue and fever. HENT:  Negative for congestion, trouble swallowing and voice change. Eyes: Negative. Respiratory:  Negative for apnea, shortness of breath and wheezing. Cardiovascular:  Negative for chest pain. Gastrointestinal:  Negative for abdominal distention, constipation, diarrhea, nausea and vomiting. Endocrine: Negative for cold intolerance and heat intolerance. Genitourinary:  Negative for dysuria and urgency. Musculoskeletal:  Positive for arthralgias, back pain and myalgias. Skin:  Negative for color change. Allergic/Immunologic: Negative for immunocompromised state. Neurological:  Positive for numbness. Negative for dizziness, facial asymmetry and headaches. Psychiatric/Behavioral:  Negative for agitation, behavioral problems and confusion. The patient is nervous/anxious. PHYSICAL EXAM   (up to 7 for level 4, 8 or more for level 5)      INITIAL VITALS:   /70   Pulse 98   Temp 97.7 °F (36.5 °C) (Oral)   Resp 20   SpO2 98%     Physical Exam  Vitals reviewed. Constitutional:       Appearance: Normal appearance. She is not ill-appearing. HENT:      Head: Normocephalic and atraumatic. Nose: Nose normal.      Mouth/Throat:      Mouth: Mucous membranes are moist.      Pharynx: Oropharynx is clear. Eyes:      Extraocular Movements: Extraocular movements intact. Conjunctiva/sclera: Conjunctivae normal.      Pupils: Pupils are equal, round, and reactive to light. Cardiovascular:      Rate and Rhythm: Normal rate and regular rhythm. Pulses: Normal pulses. Heart sounds: Normal heart sounds. Pulmonary:      Effort: Pulmonary effort is normal.      Breath sounds: Normal breath sounds. Abdominal:      General: Abdomen is flat. There is no distension. Palpations: Abdomen is soft. Tenderness: There is no abdominal tenderness. Musculoskeletal:         General: Normal range of motion. Cervical back: Normal range of motion and neck supple. Skin:     General: Skin is warm and dry. Capillary Refill: Capillary refill takes less than 2 seconds. Neurological:      General: No focal deficit present. Mental Status: She is alert. Mental status is at baseline.    Psychiatric:         Mood and Affect: Mood normal.       DIFFERENTIAL  DIAGNOSIS     DDX: Viral illness, pregnancy test    PLAN (LABS / IMAGING / EKG):  Orders Placed This Encounter   Procedures    POCT urine pregnancy       MEDICATIONS ORDERED:  Orders Placed This Encounter   Medications    acetaminophen (TYLENOL) tablet 1,000 mg    ondansetron (ZOFRAN-ODT) disintegrating tablet 4 mg    ondansetron (ZOFRAN) 4 MG tablet     Sig: Take 1 tablet by mouth every 8 hours as needed for Nausea     Dispense:  10 tablet     Refill:  0           DIAGNOSTIC RESULTS / EMERGENCY DEPARTMENT COURSE / MDM     LABS:  Results for orders placed or performed during the hospital encounter of 12/24/22   POCT urine pregnancy   Result Value Ref Range    Preg Test, Ur Negative     QC OK? yes          IMPRESSION/MDM/ED COURSE:  23 y.o. female presented with desire to have a pregnancy test as well as nonspecific symptoms that do not have a consistent physical examination finding. Will symptomatically control with Tylenol and Zofran. If patient improves and vital signs are stable will discharge after getting pregnancy test.    ED Course as of 12/24/22 1706   Sat Dec 24, 2022   1704 Patient feels significantly better after receiving the antinausea medication as well as a Tylenol. Will discharge at this time. [ES]      ED Course User Index  [ES] Pia Issa MD        Patient/Guardian requesting discharge. Patient/Guardian was given written and verbal instructions prior to discharge. Patient/Guardian understood and agreed. Patient/Guardian had no further questions. RADIOLOGY:  No orders to display         EKG  None    All EKG's are interpreted by the Emergency Department Physician who either signs or Co-signs this chart in the absence of a cardiologist.      PROCEDURES:  None    CONSULTS:  None        FINAL IMPRESSION      1.  Pregnancy test negative          DISPOSITION / PLAN       DISPOSITION Decision To Discharge 12/24/2022 05:05:52 PM        PATIENT REFERREDTO:  [facility]  [address]  [phone]  Go to       DISCHARGE MEDICATIONS:  New Prescriptions    ONDANSETRON (ZOFRAN) 4 MG TABLET    Take 1 tablet by mouth every 8 hours as needed for Nausea       Pia Issa MD  PGY 3  Resident Physician Emergency Medicine  12/24/22 5:06 PM        (Please note that portions of this note were completed with a voice recognition program.Efforts were made to edit the dictations but occasionally words are mis-transcribed.)       Pawel Sampson MD  Resident  12/24/22 7731

## 2022-12-24 NOTE — ED PROVIDER NOTES
[facility] HANDOFF       Handoff taken on the following patient from prior Attending Physician:  Pt Name: Mary Carmen Ragland  PCP:  No primary care provider on file. Attestation  I was available and discussed any additional care issues that arose and coordinated the management plans with the resident(s) caring for the patient during my duty period. Any areas of disagreement with resident's documentation of care or procedures are noted on the chart. I was personally present for the key portions of any/all procedures during my duty period. I have documented in the chart those procedures where I was not present during the key portions. CHIEF COMPLAINT       Chief Complaint   Patient presents with    Illness    Pregnancy Test         CURRENT MEDICATIONS     Previous Medications  Previous Medications    APIXABAN STARTER PACK (ELIQUIS DVT/PE STARTER PACK) 5 MG TBPK TABLET    Take 1 tablet by mouth See Admin Instructions    HYDROXYZINE HCL (ATARAX) 25 MG TABLET    TAKE 1 TABLET BY MOUTH THREE TIMES DAILY    LEVOTHYROXINE (SYNTHROID) 50 MCG TABLET    TAKE 1 TABLET BY MOUTH EVERY DAY    QUETIAPINE (SEROQUEL) 100 MG TABLET    TAKE 1 TABLET BY MOUTH EVERY DAY AT BEDTIME    SERTRALINE (ZOLOFT) 100 MG TABLET    Take 100 mg by mouth daily Pt takes 1.5 tab per day  150mg q morning    TRAZODONE (DESYREL) 100 MG TABLET           Encounter Medications  Orders Placed This Encounter   Medications    acetaminophen (TYLENOL) tablet 1,000 mg    ondansetron (ZOFRAN-ODT) disintegrating tablet 4 mg       ALLERGIES     is allergic to penicillins and vyvanse [lisdexamfetamine].       RECENT VITALS:   Temp: 97.7 °F (36.5 °C),  Heart Rate: 98, Resp: 20, BP: 122/70    RADIOLOGY:   No orders to display       LABS:  Labs Reviewed   POCT URINE PREGNANCY - Normal     Various complaints, appears nontoxic, requesting pregnancy test, will give analgesics, meal, anticipate discharge      PLAN/ TASKS OUTSTANDING Reassess, disposition      (Please note that portions of this note were completed with a voice recognition program.  Efforts were made to edit the dictations but occasionally words are mis-transcribed. )    Tal Hernadez MD,, MD, F.A.C.E.P.   Attending Emergency Physician       Tal Hernadez MD  12/24/22 8890

## 2022-12-25 NOTE — ED PROVIDER NOTES
[facility] ED  Emergency Department Encounter  Emergency Medicine Resident     Pt Bozena Mclean  MRN: [id number]  [place] 2000  Date of evaluation: 12/24/22  PCP:  No primary care provider on file. CHIEF COMPLAINT       Chief Complaint   Patient presents with    Homeless    Fall     Tamea Sprung on ice, hip injury       HISTORY OF PRESENT ILLNESS  (Location/Symptom, Timing/Onset, Context/Setting, Quality, Duration, Modifying Factors, Severity.)      Alycia Carmichael is a 23 y.o. female who presents with chief complaint of pain around the right hip with radiation to the right knee. Patient said she was coming in from the grocery store but suddenly fell on a slab of ice. Patient was here in the ED earlier this day. Pain was 6 on 10 in severity, although movement of right hip has been preserved. Denies any loss of consciousness, trauma to the head, belly trauma. PAST MEDICAL / SURGICAL / SOCIAL / FAMILY HISTORY      has a past medical history of Anxiety, Bipolar 1 disorder ([address] Heart murmur, PTSD (post-traumatic stress disorder), and Thyroid disease. has no past surgical history on file.       Social History     Socioeconomic History    Marital status: Single     Spouse name: Not on file    Number of children: Not on file    Years of education: Not on file    Highest education level: Not on file   Occupational History    Not on file   Tobacco Use    Smoking status: Never    Smokeless tobacco: Never   Substance and Sexual Activity    Alcohol use: Not Currently    Drug use: Not Currently    Sexual activity: Not on file   Other Topics Concern    Not on file   Social History Narrative    Not on file     Social Determinants of Health     Financial Resource Strain: Not on file   Food Insecurity: Not on file   Transportation Needs: Not on file   Physical Activity: Not on file   Stress: Not on file   Social Connections: Not on file   Intimate Partner Violence: Not on file   Housing Stability: Not on file       No family history on file. Allergies:  Penicillins and Vyvanse [lisdexamfetamine]    Home Medications:  Prior to Admission medications    Medication Sig Start Date End Date Taking? Authorizing Provider   ibuprofen (IBU) 400 MG tablet Take 1 tablet by mouth every 8 hours as needed for Pain 12/24/22  Yes Suhas Howard MD   ondansetron (ZOFRAN) 4 MG tablet Take 1 tablet by mouth every 8 hours as needed for Nausea 12/24/22   Cleo Villa MD   hydrOXYzine HCl (ATARAX) 25 MG tablet TAKE 1 TABLET BY MOUTH THREE TIMES DAILY  Patient not taking: Reported on 12/24/2022 8/19/22   Historical Provider, MD   levothyroxine (SYNTHROID) 50 MCG tablet TAKE 1 TABLET BY MOUTH EVERY DAY 8/15/22   Historical Provider, MD   traZODone (DESYREL) 100 MG tablet  8/24/22   Historical Provider, MD   QUEtiapine (SEROQUEL) 100 MG tablet TAKE 1 TABLET BY MOUTH EVERY DAY AT BEDTIME 7/30/22   Historical Provider, MD   sertraline (ZOLOFT) 100 MG tablet Take 100 mg by mouth daily Pt takes 1.5 tab per day  150mg q morning    Historical Provider, MD   apixaban starter pack (ELIQUIS DVT/PE STARTER PACK) 5 MG TBPK tablet Take 1 tablet by mouth See Admin Instructions 7/28/22   Linsey Rich MD       REVIEW OF SYSTEMS    (2-9 systems for level 4, 10 or more for level 5)      Review of Systems   Constitutional:  Negative for activity change and appetite change. HENT:  Negative for congestion and dental problem. Eyes:  Negative for discharge and itching. Respiratory:  Negative for apnea and chest tightness. Cardiovascular:  Negative for chest pain and leg swelling. Gastrointestinal:  Negative for abdominal distention, abdominal pain, constipation and diarrhea. Endocrine: Negative for cold intolerance. Genitourinary:  Negative for difficulty urinating, dyspareunia and dysuria. Musculoskeletal:  Negative for arthralgias, back pain and gait problem.         Patient complained of severe right hip pain     PHYSICAL EXAM   (up to 7 for level 4, 8 or more for level 5)      INITIAL VITALS:   BP (!) 138/95   Pulse (!) 101   Temp 97 °F (36.1 °C) (Oral)   Resp 16   SpO2 95%     Physical Exam  Constitutional:       Appearance: Normal appearance. HENT:      Head: Normocephalic and atraumatic. Cardiovascular:      Rate and Rhythm: Normal rate and regular rhythm. Pulses: Normal pulses. Heart sounds: Normal heart sounds. Pulmonary:      Effort: Pulmonary effort is normal. No respiratory distress. Breath sounds: Normal breath sounds. No stridor. No wheezing. Abdominal:      General: Abdomen is flat. Palpations: Abdomen is soft. Neurological:      General: No focal deficit present. Mental Status: She is alert and oriented to person, place, and time. DIFFERENTIAL  DIAGNOSIS     PLAN (LABS / IMAGING / EKG):  Orders Placed This Encounter   Procedures    XR HIP 2-3 VW W PELVIS RIGHT       MEDICATIONS ORDERED:  Orders Placed This Encounter   Medications    ibuprofen (IBU) 400 MG tablet     Sig: Take 1 tablet by mouth every 8 hours as needed for Pain     Dispense:  20 tablet     Refill:  1       DDX: Right hip pain    DIAGNOSTIC RESULTS / EMERGENCY DEPARTMENT COURSE / MDM   LAB RESULTS:  No results found for this visit on 12/24/22. IMPRESSION: Right hip pain    RADIOLOGY:  XR HIP 2-3 VW W PELVIS RIGHT   Final Result      No acute osseous abnormality. No fracture. EMERGENCY DEPARTMENT COURSE:  29-year-old female Mrs. Candi Soriano coming into the ED with a chief complaint of right hip pain since she had a fall while coming in from the grocery store and fell on the ice. Upon examination of right hip all the movements are preserved with some mild pain over the lateral aspect of the hip. X-ray of the right hip and pelvis have been ordered. No notes of EC Admission Criteria type on file.       FINAL IMPRESSION      Right hip pain     DISPOSITION / PLAN     DISPOSITION Decision To Discharge 12/24/2022 09:28:31 PM      PATIENT REFERRED TO:  No follow-up provider specified.     DISCHARGE MEDICATIONS:  New Prescriptions    IBUPROFEN (IBU) 400 MG TABLET    Take 1 tablet by mouth every 8 hours as needed for Pain       Lana Garza MD  Emergency Medicine Resident    (Please note that portions of thisnote were completed with a voice recognition program.  Efforts were made to edit the dictations but occasionally words are mis-transcribed.)      Lana Garza MD  Resident  12/24/22 6382

## 2022-12-25 NOTE — ED PROVIDER NOTES
[facility]     Emergency Department     Faculty Attestation    I performed a history and physical examination of the patient and discussed management with the resident. I reviewed the resident´s note and agree with the documented findings and plan of care. Any areas of disagreement are noted on the chart. I was personally present for the key portions of any procedures. I have documented in the chart those procedures where I was not present during the key portions. I have reviewed the emergency nurses triage note. I agree with the chief complaint, past medical history, past surgical history, allergies, medications, social and family history as documented unless otherwise noted below. For Physician Assistant/ Nurse Practitioner cases/documentation I have personally evaluated this patient and have completed at least one if not all key elements of the E/M (history, physical exam, and MDM). Additional findings are as noted. Pain right lateral hip, pelvis stable, the remainder of the femur was nontender.      Ramon Garcia MD  12/24/22 1076

## 2022-12-25 NOTE — DISCHARGE INSTRUCTIONS
You were here for right hip pain  Please take pain medication as prescribed  Follow-up with your PCP

## 2022-12-25 NOTE — ED NOTES
Pt arrives to ED with right hip pain after falling on ice. Pt states pain is 10/10, denies taking anything otc. Pt was seen earlier today in ED for tailbone pain. Pt received warm blankets. Pt A&Ox4, RR even and nonlabored, NAD.      Jacklyn Dixon, RN  12/24/22 0809

## 2023-03-19 ENCOUNTER — APPOINTMENT (OUTPATIENT)
Dept: GENERAL RADIOLOGY | Age: 23
End: 2023-03-19
Payer: MEDICAID

## 2023-03-19 ENCOUNTER — APPOINTMENT (OUTPATIENT)
Dept: CT IMAGING | Age: 23
End: 2023-03-19
Payer: MEDICAID

## 2023-03-19 ENCOUNTER — HOSPITAL ENCOUNTER (EMERGENCY)
Age: 23
Discharge: HOME | End: 2023-03-19
Attending: EMERGENCY MEDICINE
Payer: MEDICAID

## 2023-03-19 VITALS
DIASTOLIC BLOOD PRESSURE: 80 MMHG | TEMPERATURE: 97.6 F | OXYGEN SATURATION: 100 % | RESPIRATION RATE: 6 BRPM | HEART RATE: 81 BPM | SYSTOLIC BLOOD PRESSURE: 127 MMHG

## 2023-03-19 DIAGNOSIS — M25.572 ACUTE LEFT ANKLE PAIN: Primary | ICD-10-CM

## 2023-03-19 PROCEDURE — 70450 CT HEAD/BRAIN W/O DYE: CPT

## 2023-03-19 PROCEDURE — 99284 EMERGENCY DEPT VISIT MOD MDM: CPT

## 2023-03-19 PROCEDURE — 6370000000 HC RX 637 (ALT 250 FOR IP): Performed by: EMERGENCY MEDICINE

## 2023-03-19 PROCEDURE — 73630 X-RAY EXAM OF FOOT: CPT

## 2023-03-19 PROCEDURE — 73610 X-RAY EXAM OF ANKLE: CPT

## 2023-03-19 RX ORDER — ACETAMINOPHEN 500 MG
1000 TABLET ORAL ONCE
Status: COMPLETED | OUTPATIENT
Start: 2023-03-19 | End: 2023-03-19

## 2023-03-19 RX ORDER — ACETAMINOPHEN 500 MG
1000 TABLET ORAL EVERY 6 HOURS PRN
Qty: 24 TABLET | Refills: 0 | Status: SHIPPED | OUTPATIENT
Start: 2023-03-19 | End: 2023-03-22

## 2023-03-19 RX ORDER — IBUPROFEN 800 MG/1
800 TABLET ORAL EVERY 8 HOURS PRN
Qty: 9 TABLET | Refills: 0 | Status: SHIPPED | OUTPATIENT
Start: 2023-03-19 | End: 2023-03-22

## 2023-03-19 RX ADMIN — ACETAMINOPHEN 1000 MG: 500 TABLET ORAL at 10:38

## 2023-03-19 ASSESSMENT — ENCOUNTER SYMPTOMS
SHORTNESS OF BREATH: 0
NAUSEA: 0
COLOR CHANGE: 0
ABDOMINAL PAIN: 0
VOMITING: 0

## 2023-03-19 ASSESSMENT — PAIN DESCRIPTION - ORIENTATION
ORIENTATION: LEFT
ORIENTATION: LEFT

## 2023-03-19 ASSESSMENT — PAIN DESCRIPTION - DESCRIPTORS
DESCRIPTORS: DISCOMFORT;ACHING
DESCRIPTORS: ACHING

## 2023-03-19 ASSESSMENT — PAIN DESCRIPTION - LOCATION
LOCATION: ANKLE
LOCATION: ANKLE

## 2023-03-19 ASSESSMENT — PAIN SCALES - GENERAL
PAINLEVEL_OUTOF10: 10
PAINLEVEL_OUTOF10: 10

## 2023-03-19 ASSESSMENT — PAIN - FUNCTIONAL ASSESSMENT: PAIN_FUNCTIONAL_ASSESSMENT: 0-10

## 2023-03-19 NOTE — ED NOTES
Pt came into the ED with C/O of left ankle pain due to tripping this morning. Pt is on blood thinners and stated that she did hit her head a little but did not loss consciousness. Pt is Aox4 albe to ambulate.       Rui Tay RN  03/19/23 0747

## 2023-03-19 NOTE — DISCHARGE INSTRUCTIONS
You were seen here for left ankle pain and a headache. CT head did not show any acute bleeds, left ankle and foot x-rays did not show any fractures. You were given a boot to use as needed for pain and ambulation. You were given a prescription for Tylenol and Motrin. Take this as needed for pain. Return to the emergency department immediately if you experience worsening symptoms including persistent headache, vomiting, vision changes, fever, worsening swelling of the ankle, skin color changes, or any other concerns.

## 2023-03-19 NOTE — ED PROVIDER NOTES
CT scan of the brain was ordered after minor head trauma of less than 24 hours with a GCS of 15 due to:  Coagulopathy. Critical Care  None      (Please note that portions of this note were completed with a voice recognition program. Efforts were made to edit the dictations but occasionally words are mis-transcribed.  Whenever words are used in this note in any gender, they shall be construed as though they were used in the gender appropriate to the circumstances; and whenever words are used in this note in the singular or plural form, they shall be construed as though they were used in the form appropriate to the circumstances.)    MD Olga Madsen  Attending Emergency Medicine Physician            Eladio Bolaños MD  03/19/23 1033       Eladio Bolaños MD  03/19/23 1100
COURSE:  Patient evaluated at bedside. Will obtain head CT without contrast as well as left ankle and foot x-rays. Will give Tylenol for pain. Patient was reassessed, feeling better with the Tylenol for pain. Patient was updated about her results and that there was no acute intracranial pathology nor were there any acute fractures of the ankle or foot. Patient was given an Aircast for comfort. Patient already has crutches at home. Patient was medically cleared for discharge. PROCEDURES:  None    CONSULTS:  None      FINAL IMPRESSION      1.  Acute left ankle pain          DISPOSITION / PLAN     DISPOSITION Decision To Discharge 03/19/2023 12:28:14 PM      PATIENT REFERRED TO:  [address] [address] [phone]    As needed    [facility]  [address] [address]  [phone]        DISCHARGE MEDICATIONS:  Discharge Medication List as of 3/19/2023 12:30 PM        START taking these medications    Details   acetaminophen (TYLENOL) 500 MG tablet Take 2 tablets by mouth every 6 hours as needed for Pain, Disp-24 tablet, R-0Print             Natan Cai MD  Emergency Medicine Resident    (Please note that portions of thisnote were completed with a voice recognition program.  Efforts were made to edit the dictations but occasionally words are mis-transcribed.)       Natan Cai MD  Resident  03/19/23 1001

## 2023-03-29 ENCOUNTER — HOSPITAL ENCOUNTER (INPATIENT)
Age: 23
LOS: 8 days | Discharge: HOME | DRG: 751 | End: 2023-04-06
Attending: EMERGENCY MEDICINE | Admitting: PSYCHIATRY & NEUROLOGY
Payer: COMMERCIAL

## 2023-03-29 DIAGNOSIS — R45.851 DEPRESSION WITH SUICIDAL IDEATION: Primary | ICD-10-CM

## 2023-03-29 DIAGNOSIS — F32.A DEPRESSION WITH SUICIDAL IDEATION: Primary | ICD-10-CM

## 2023-03-29 LAB
ACETAMINOPHEN LEVEL: <5 UG/ML (ref 10–30)
ALBUMIN SERPL-MCNC: 4.6 G/DL (ref 3.5–5.2)
ALP SERPL-CCNC: 85 U/L (ref 35–104)
ALT SERPL-CCNC: 32 U/L (ref 5–33)
AMPHETAMINE SCREEN URINE: NEGATIVE
ANION GAP SERPL CALCULATED.3IONS-SCNC: 12 MMOL/L (ref 9–17)
AST SERPL-CCNC: 32 U/L
BARBITURATE SCREEN URINE: NEGATIVE
BENZODIAZEPINE SCREEN, URINE: NEGATIVE
BILIRUB SERPL-MCNC: <0.2 MG/DL (ref 0.3–1.2)
BUN SERPL-MCNC: 21 MG/DL (ref 6–20)
CALCIUM SERPL-MCNC: 10.9 MG/DL (ref 8.6–10.4)
CANNABINOID SCREEN URINE: NEGATIVE
CHLORIDE SERPL-SCNC: 100 MMOL/L (ref 98–107)
CO2 SERPL-SCNC: 25 MMOL/L (ref 20–31)
COCAINE METABOLITE, URINE: NEGATIVE
CREAT SERPL-MCNC: 0.65 MG/DL (ref 0.5–0.9)
ETHANOL PERCENT: <0.01 %
ETHANOL: <10 MG/DL
FENTANYL URINE: NEGATIVE
GFR SERPL CREATININE-BSD FRML MDRD: >60 ML/MIN/1.73M2
GLUCOSE SERPL-MCNC: 100 MG/DL (ref 70–99)
HCT VFR BLD AUTO: 36 % (ref 36–46)
HGB BLD-MCNC: 12 G/DL (ref 12–16)
MCH RBC QN AUTO: 26.8 PG (ref 26–34)
MCHC RBC AUTO-ENTMCNC: 33.4 G/DL (ref 31–37)
MCV RBC AUTO: 80.3 FL (ref 80–100)
METHADONE SCREEN, URINE: NEGATIVE
OPIATES, URINE: NEGATIVE
OXYCODONE SCREEN URINE: NEGATIVE
PDW BLD-RTO: 15.4 % (ref 11.5–14.9)
PHENCYCLIDINE, URINE: NEGATIVE
PLATELET # BLD AUTO: 296 K/UL (ref 150–450)
PMV BLD AUTO: 7.4 FL (ref 6–12)
POTASSIUM SERPL-SCNC: 4 MMOL/L (ref 3.7–5.3)
PROT SERPL-MCNC: 8.4 G/DL (ref 6.4–8.3)
RBC # BLD: 4.48 M/UL (ref 4–5.2)
SALICYLATE LEVEL: <1 MG/DL (ref 3–10)
SODIUM SERPL-SCNC: 137 MMOL/L (ref 135–144)
TEST INFORMATION: NORMAL
TOXIC TRICYCLIC SC,BLOOD: ABNORMAL
TRICYCLIC ANTIDEP,URINE: NEGATIVE
TSH SERPL-ACNC: 2.54 UIU/ML (ref 0.3–5)
WBC # BLD AUTO: 8.4 K/UL (ref 4.5–13.5)

## 2023-03-29 PROCEDURE — 1240000000 HC EMOTIONAL WELLNESS R&B

## 2023-03-29 PROCEDURE — G0480 DRUG TEST DEF 1-7 CLASSES: HCPCS

## 2023-03-29 PROCEDURE — 80143 DRUG ASSAY ACETAMINOPHEN: CPT

## 2023-03-29 PROCEDURE — 80179 DRUG ASSAY SALICYLATE: CPT

## 2023-03-29 PROCEDURE — 80307 DRUG TEST PRSMV CHEM ANLYZR: CPT

## 2023-03-29 PROCEDURE — 99285 EMERGENCY DEPT VISIT HI MDM: CPT

## 2023-03-29 PROCEDURE — 84443 ASSAY THYROID STIM HORMONE: CPT

## 2023-03-29 PROCEDURE — 36415 COLL VENOUS BLD VENIPUNCTURE: CPT

## 2023-03-29 PROCEDURE — 85027 COMPLETE CBC AUTOMATED: CPT

## 2023-03-29 PROCEDURE — 80053 COMPREHEN METABOLIC PANEL: CPT

## 2023-03-29 RX ORDER — IBUPROFEN 400 MG/1
400 TABLET ORAL EVERY 6 HOURS PRN
Status: DISCONTINUED | OUTPATIENT
Start: 2023-03-29 | End: 2023-04-06 | Stop reason: HOSPADM

## 2023-03-29 RX ORDER — HALOPERIDOL 5 MG/1
5 TABLET ORAL EVERY 6 HOURS PRN
Status: DISCONTINUED | OUTPATIENT
Start: 2023-03-29 | End: 2023-04-06 | Stop reason: HOSPADM

## 2023-03-29 RX ORDER — DIPHENHYDRAMINE HYDROCHLORIDE 50 MG/ML
50 INJECTION INTRAMUSCULAR; INTRAVENOUS EVERY 6 HOURS PRN
Status: DISCONTINUED | OUTPATIENT
Start: 2023-03-29 | End: 2023-04-06 | Stop reason: HOSPADM

## 2023-03-29 RX ORDER — TRAZODONE HYDROCHLORIDE 50 MG/1
50 TABLET ORAL NIGHTLY PRN
Status: DISCONTINUED | OUTPATIENT
Start: 2023-03-30 | End: 2023-04-03

## 2023-03-29 RX ORDER — POLYETHYLENE GLYCOL 3350 17 G/17G
17 POWDER, FOR SOLUTION ORAL DAILY PRN
Status: DISCONTINUED | OUTPATIENT
Start: 2023-03-29 | End: 2023-04-06 | Stop reason: HOSPADM

## 2023-03-29 RX ORDER — HYDROXYZINE 50 MG/1
50 TABLET, FILM COATED ORAL 3 TIMES DAILY PRN
Status: DISCONTINUED | OUTPATIENT
Start: 2023-03-29 | End: 2023-04-06 | Stop reason: HOSPADM

## 2023-03-29 RX ORDER — HALOPERIDOL 5 MG/ML
5 INJECTION INTRAMUSCULAR EVERY 6 HOURS PRN
Status: DISCONTINUED | OUTPATIENT
Start: 2023-03-29 | End: 2023-04-06 | Stop reason: HOSPADM

## 2023-03-29 RX ORDER — MAGNESIUM HYDROXIDE/ALUMINUM HYDROXICE/SIMETHICONE 120; 1200; 1200 MG/30ML; MG/30ML; MG/30ML
30 SUSPENSION ORAL EVERY 6 HOURS PRN
Status: DISCONTINUED | OUTPATIENT
Start: 2023-03-29 | End: 2023-04-06 | Stop reason: HOSPADM

## 2023-03-29 RX ORDER — ACETAMINOPHEN 325 MG/1
650 TABLET ORAL EVERY 6 HOURS PRN
Status: DISCONTINUED | OUTPATIENT
Start: 2023-03-29 | End: 2023-04-06 | Stop reason: HOSPADM

## 2023-03-29 ASSESSMENT — ENCOUNTER SYMPTOMS
EYE DISCHARGE: 0
BLOOD IN STOOL: 0
COUGH: 0
DIARRHEA: 0
RHINORRHEA: 0
COLOR CHANGE: 0
VOMITING: 0
SINUS PRESSURE: 0
NAUSEA: 0
EYE REDNESS: 0
EYE PAIN: 0
TROUBLE SWALLOWING: 0
SORE THROAT: 0
BACK PAIN: 0
CONSTIPATION: 0
CHEST TIGHTNESS: 0
FACIAL SWELLING: 0
SHORTNESS OF BREATH: 0
WHEEZING: 0
ABDOMINAL PAIN: 0

## 2023-03-29 ASSESSMENT — LIFESTYLE VARIABLES
HOW OFTEN DO YOU HAVE A DRINK CONTAINING ALCOHOL: NEVER
HOW MANY STANDARD DRINKS CONTAINING ALCOHOL DO YOU HAVE ON A TYPICAL DAY: PATIENT DOES NOT DRINK

## 2023-03-29 ASSESSMENT — PATIENT HEALTH QUESTIONNAIRE - PHQ9: SUM OF ALL RESPONSES TO PHQ QUESTIONS 1-9: 6

## 2023-03-29 ASSESSMENT — PAIN - FUNCTIONAL ASSESSMENT: PAIN_FUNCTIONAL_ASSESSMENT: NONE - DENIES PAIN

## 2023-03-30 PROBLEM — F33.3 MAJOR DEPRESSIVE DISORDER, RECURRENT, SEVERE WITH PSYCHOTIC FEATURES (HCC): Status: ACTIVE | Noted: 2023-03-30

## 2023-03-30 PROCEDURE — 99223 1ST HOSP IP/OBS HIGH 75: CPT | Performed by: INTERNAL MEDICINE

## 2023-03-30 PROCEDURE — 1240000000 HC EMOTIONAL WELLNESS R&B

## 2023-03-30 PROCEDURE — 6370000000 HC RX 637 (ALT 250 FOR IP): Performed by: INTERNAL MEDICINE

## 2023-03-30 PROCEDURE — 6370000000 HC RX 637 (ALT 250 FOR IP): Performed by: PSYCHIATRY & NEUROLOGY

## 2023-03-30 PROCEDURE — 6370000000 HC RX 637 (ALT 250 FOR IP)

## 2023-03-30 RX ORDER — ARIPIPRAZOLE 5 MG/1
5 TABLET ORAL DAILY
Status: DISCONTINUED | OUTPATIENT
Start: 2023-03-30 | End: 2023-03-31

## 2023-03-30 RX ADMIN — HYDROXYZINE HYDROCHLORIDE 50 MG: 50 TABLET, FILM COATED ORAL at 07:35

## 2023-03-30 RX ADMIN — APIXABAN 5 MG: 5 TABLET, FILM COATED ORAL at 20:54

## 2023-03-30 RX ADMIN — APIXABAN 5 MG: 5 TABLET, FILM COATED ORAL at 14:44

## 2023-03-30 RX ADMIN — ACETAMINOPHEN 650 MG: 325 TABLET ORAL at 14:44

## 2023-03-30 RX ADMIN — ARIPIPRAZOLE 5 MG: 5 TABLET ORAL at 12:43

## 2023-03-30 RX ADMIN — NICOTINE POLACRILEX 2 MG: 2 GUM, CHEWING BUCCAL at 12:45

## 2023-03-30 RX ADMIN — NICOTINE POLACRILEX 2 MG: 2 GUM, CHEWING BUCCAL at 07:34

## 2023-03-30 RX ADMIN — NICOTINE POLACRILEX 2 MG: 2 GUM, CHEWING BUCCAL at 17:34

## 2023-03-30 ASSESSMENT — PAIN SCALES - GENERAL
PAINLEVEL_OUTOF10: 0
PAINLEVEL_OUTOF10: 3

## 2023-03-30 ASSESSMENT — PAIN - FUNCTIONAL ASSESSMENT: PAIN_FUNCTIONAL_ASSESSMENT: ACTIVITIES ARE NOT PREVENTED

## 2023-03-30 ASSESSMENT — PAIN DESCRIPTION - LOCATION: LOCATION: HEAD

## 2023-03-30 ASSESSMENT — PAIN DESCRIPTION - DESCRIPTORS: DESCRIPTORS: ACHING

## 2023-03-30 ASSESSMENT — SLEEP AND FATIGUE QUESTIONNAIRES
DO YOU USE A SLEEP AID: NO
DO YOU HAVE DIFFICULTY SLEEPING: NO
AVERAGE NUMBER OF SLEEP HOURS: 8

## 2023-03-30 NOTE — ED NOTES
Safeguard in [facility] for patient watch. Safeguard informed that they need to stay with the patient at all time, must be present in the room and if they need a break or relief to let the nurse know so they can be replaced. Safeguard verbalizes understanding. Belongings and patient checked by security. Belongings locked up. Pt in blue gown. Mode of arrival (squad #, walk in, police, etc) : [employer]        Chief complaint(s): Mental Health Evaluation        Arrival Note (brief scenario, treatment PTA, etc). : pt brought into ED by [employer]. Pt has been feeling thoughts of suicide with plan to cut wrist all day per patient. Pt stated she has had previous attempts of suicide in the past. Pt states she's not from here she's from [place] and is here due to her 4 children and children's father currently incarcerated. Pt states she's been under a lot of stress and admits to abdirashid loretta hallucinations telling her to harm herself. Pt denies visual hallucinations and denies the use of drugs and alcohol at this time. C= \"Have you ever felt that you should Cut down on your drinking? \"  No  A= \"Have people Annoyed you by criticizing your drinking? \"  No  G= \"Have you ever felt bad or Guilty about your drinking? \"  No  E= \"Have you ever had a drink as an Eye-opener first thing in the morning to steady your nerves or to help a hangover? \"  No      Deferred []      Reason for deferring: N/A    *If yes to two or more: probable alcohol abuse. Nora Rodriguez LPN  09/47/75 4848
appetite. Level of Care Disposition:.JIMENA consulted with  from psychiatry. Pt accepted for an inpatient admission to the [facility] for safety and stabilization.

## 2023-03-30 NOTE — ED PROVIDER NOTES
[address] ED  eMERGENCY dEPARTMENT eNCOUnter      Pt Name: Chin Truong  MRN: [id number]  [place] 2000  Date of evaluation: 3/29/23      CHIEF COMPLAINT       Chief Complaint   Patient presents with    [address] [facility]    Chin Truong is a 23 y.o. female who presents complaining of suicidal ideation. Patient has a history of anxiety bipolar and PTSD. Patient states that she is been feeling suicidal all day today with thoughts of killing herself by cutting her wrist.  Patient has had prior suicide attempts in the past with overdosing. Patient states she is not currently taking any antidepressants. Patient does have a pack of medications that she states she is taking but none of them are for her actual depression. Patient took herself off because of the fact that she was having suicidal ideations while on them. Patient denies any somatic complaints. Patient does state that she has been having some hallucinations. Patient denies any drug or alcohol abuse. REVIEW OF SYSTEMS       Review of Systems   Constitutional:  Negative for activity change, appetite change, chills, diaphoresis and fever. HENT:  Negative for congestion, ear pain, facial swelling, nosebleeds, rhinorrhea, sinus pressure, sore throat and trouble swallowing. Eyes:  Negative for pain, discharge and redness. Respiratory:  Negative for cough, chest tightness, shortness of breath and wheezing. Cardiovascular:  Negative for chest pain, palpitations and leg swelling. Gastrointestinal:  Negative for abdominal pain, blood in stool, constipation, diarrhea, nausea and vomiting. Genitourinary:  Negative for difficulty urinating, dysuria, flank pain, frequency, genital sores and hematuria. Musculoskeletal:  Negative for arthralgias, back pain, gait problem, joint swelling, myalgias and neck pain. Skin:  Negative for color change, pallor, rash and wound.    Neurological:
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
## 2023-03-30 NOTE — H&P
Historical Provider, MD   QUEtiapine (SEROQUEL) 100 MG tablet TAKE 1 TABLET BY MOUTH EVERY DAY AT BEDTIME  Patient not taking: Reported on 3/30/2023 7/30/22   Historical Provider, MD   sertraline (ZOLOFT) 100 MG tablet Take 100 mg by mouth daily Pt takes 1.5 tab per day  150mg q morning  Patient not taking: Reported on 3/30/2023    Historical Provider, MD        Allergies:     Penicillins and Vyvanse [lisdexamfetamine]    Social History:     Tobacco:    reports that she has never smoked. She has never used smokeless tobacco.  Alcohol:      reports that she does not currently use alcohol. Drug Use:  reports that she does not currently use drugs. Family History:     No family history on file. Review of Systems:     Positive and Negative as described in HPI. CONSTITUTIONAL:  negative for fevers, chills, sweats, fatigue, weight loss  HEENT:  negative for vision, hearing changes, runny nose, throat pain  RESPIRATORY:  negative for shortness of breath, cough, congestion, wheezing. CARDIOVASCULAR:  negative for chest pain, palpitations.   GASTROINTESTINAL:  negative for nausea, vomiting, diarrhea, constipation, change in bowel habits, abdominal pain   GENITOURINARY:  negative for difficulty of urination, burning with urination, frequency   INTEGUMENT:  negative for rash, skin lesions, easy bruising   HEMATOLOGIC/LYMPHATIC:  negative for swelling/edema   ALLERGIC/IMMUNOLOGIC:  negative for urticaria , itching  ENDOCRINE:  negative increase in drinking, increase in urination, hot or cold intolerance  MUSCULOSKELETAL:  negative joint pains, muscle aches, swelling of joints  NEUROLOGICAL:  negative for headaches, dizziness, lightheadedness, numbness, pain, tingling extremities  BEHAVIOR/PSYCH: Depressed    Physical Exam:     /70   Pulse 90   Temp 98.5 °F (36.9 °C) (Tympanic)   Resp 16   Ht 5' 5\" (1.651 m)   Wt 140 lb (63.5 kg)   SpO2 100%   BMI 23.30 kg/m²   Temp (24hrs), Av.2 °F (36.8 °C), Min:97.7
Stabilization of symptoms prior to discharge. 3) Establish efficacy and tolerability of medications. Behavioral Services  Medicare Certification     Admission Day 1  I certify that this patient's inpatient psychiatric hospital admission is medically necessary for:    x (1) treatment which could reasonably be expected to improve this patient's condition, or    x (2) diagnostic study or its equivalent. Time Spent: 61 minutes    Alfonso Aldana is a 23 y.o. female being evaluated face to face    --CRYSTAL Doshi CNP on 3/30/2023 at 10:24 AM    An electronic signature was used to authenticate this note. Please note that this chart was generated using voice recognition Dragon dictation software. Although every effort was made to ensure the accuracy of this automated transcription, some errors in transcription may have occurred.

## 2023-03-30 NOTE — GROUP NOTE
Group Therapy Note    Date: 3/30/2023    Group Start Time: 1330  Group End Time: 1769  Group Topic: recreation group     [address] [facility] CEL[facility]    [place], [place]      Group Therapy Note    Attendees 5/13       Patient's Goal:  pt will demonstrate improved social skills and improved coping skills     Notes:   pt is pleasant and participated well with redirections    Status After Intervention:  Improved    Participation Level:  Active Listener and Interactive    Participation Quality: Appropriate, Attentive, and Supportive      Speech:  hyeprverbal      Thought Process/Content: flight of idea      Affective Functioning: Congruent      Mood: manic      Level of consciousness:  Alert      Response to Learning: Able to verbalize current knowledge/experience and Progressing to goal      Endings: None Reported    Modes of Intervention: Education, Support, and Activity      Discipline Responsible: Psychoeducational Specialist      Signature:  Robert Bowden

## 2023-03-30 NOTE — GROUP NOTE
Group Therapy Note    Date: 3/30/2023    Group Start Time: 6701  Group End Time: 1120  Group Topic: cognitive skills     [facility] C[facility]    Annell Parents, CTRS        Group Therapy Note    Attendees: 6/15       Patient's Goal:  pt will deomonstrate improved coping skills and improved communication skills     Notes:   pt is pleasant and participated well     Status After Intervention:  Improved    Participation Level:  Active Listener and Interactive    Participation Quality: Appropriate, Attentive, and Supportive      Speech: hyperverbal , pressured      Thought Process/Content: flight of ideas      Affective Functioning: manic       Mood: euthymic      Level of consciousness:  Alert      Response to Learning: Able to verbalize current knowledge/experience and Progressing to goal      Endings: None Reported    Modes of Intervention: Education, Support, and Activity      Discipline Responsible: Psychoeducational Specialist      Signature:  Bob Ledesma

## 2023-03-30 NOTE — GROUP NOTE
Group Therapy Note    Date: 3/30/2023    Group Start Time: 0900  Group End Time: 0930  Group Topic: Community Meeting    [facility] CELSO Jones LPN        Group Therapy Note    Attendees: 8/15       Patient's Goal:  Distraction from harmful thoughts by doing hobbies and socializing. Status After Intervention:  Improved    Participation Level:  Active Listener and Interactive    Participation Quality: Appropriate and Sharing      Speech:  normal and loud      Thought Process/Content: Flight of ideas      Affective Functioning: Blunted      Mood: anxious, depressed, and elevated      Level of consciousness:  Alert, Oriented x4, and Attentive      Response to Learning: Able to verbalize current knowledge/experience, Able to verbalize/acknowledge new learning, and Progressing to goal      Endings: Suicidality    Modes of Intervention: Education, Support, Socialization, and Problem-solving      Discipline Responsible: Licensed Practical Nurse      Signature:  Mayelin Whitehead LPN

## 2023-03-31 LAB
ANION GAP SERPL CALCULATED.3IONS-SCNC: 9 MMOL/L (ref 9–17)
BUN SERPL-MCNC: 12 MG/DL (ref 6–20)
CALCIUM SERPL-MCNC: 11.5 MG/DL (ref 8.6–10.4)
CHLORIDE SERPL-SCNC: 100 MMOL/L (ref 98–107)
CO2 SERPL-SCNC: 29 MMOL/L (ref 20–31)
CREAT SERPL-MCNC: 0.56 MG/DL (ref 0.5–0.9)
GFR SERPL CREATININE-BSD FRML MDRD: >60 ML/MIN/1.73M2
GLUCOSE SERPL-MCNC: 94 MG/DL (ref 70–99)
POTASSIUM SERPL-SCNC: 4.2 MMOL/L (ref 3.7–5.3)
SODIUM SERPL-SCNC: 138 MMOL/L (ref 135–144)

## 2023-03-31 PROCEDURE — 99232 SBSQ HOSP IP/OBS MODERATE 35: CPT | Performed by: INTERNAL MEDICINE

## 2023-03-31 PROCEDURE — 99222 1ST HOSP IP/OBS MODERATE 55: CPT | Performed by: PSYCHIATRY & NEUROLOGY

## 2023-03-31 PROCEDURE — 6370000000 HC RX 637 (ALT 250 FOR IP): Performed by: PSYCHIATRY & NEUROLOGY

## 2023-03-31 PROCEDURE — 36415 COLL VENOUS BLD VENIPUNCTURE: CPT

## 2023-03-31 PROCEDURE — 6370000000 HC RX 637 (ALT 250 FOR IP): Performed by: INTERNAL MEDICINE

## 2023-03-31 PROCEDURE — 80048 BASIC METABOLIC PNL TOTAL CA: CPT

## 2023-03-31 PROCEDURE — 6370000000 HC RX 637 (ALT 250 FOR IP)

## 2023-03-31 PROCEDURE — 1240000000 HC EMOTIONAL WELLNESS R&B

## 2023-03-31 RX ORDER — ARIPIPRAZOLE 10 MG/1
10 TABLET ORAL DAILY
Status: DISCONTINUED | OUTPATIENT
Start: 2023-04-01 | End: 2023-04-01

## 2023-03-31 RX ADMIN — APIXABAN 5 MG: 5 TABLET, FILM COATED ORAL at 20:42

## 2023-03-31 RX ADMIN — NICOTINE POLACRILEX 2 MG: 2 GUM, CHEWING BUCCAL at 07:22

## 2023-03-31 RX ADMIN — HALOPERIDOL 5 MG: 5 TABLET ORAL at 22:53

## 2023-03-31 RX ADMIN — ALUMINUM HYDROXIDE, MAGNESIUM HYDROXIDE, AND SIMETHICONE 30 ML: 200; 200; 20 SUSPENSION ORAL at 19:56

## 2023-03-31 RX ADMIN — IBUPROFEN 400 MG: 400 TABLET, FILM COATED ORAL at 19:56

## 2023-03-31 RX ADMIN — HYDROXYZINE HYDROCHLORIDE 50 MG: 50 TABLET, FILM COATED ORAL at 08:30

## 2023-03-31 RX ADMIN — ARIPIPRAZOLE 5 MG: 5 TABLET ORAL at 08:30

## 2023-03-31 RX ADMIN — APIXABAN 5 MG: 5 TABLET, FILM COATED ORAL at 08:30

## 2023-03-31 RX ADMIN — ACETAMINOPHEN 650 MG: 325 TABLET ORAL at 17:41

## 2023-03-31 RX ADMIN — HYDROXYZINE HYDROCHLORIDE 50 MG: 50 TABLET, FILM COATED ORAL at 20:42

## 2023-03-31 ASSESSMENT — PAIN SCALES - GENERAL
PAINLEVEL_OUTOF10: 8
PAINLEVEL_OUTOF10: 10

## 2023-03-31 ASSESSMENT — PAIN DESCRIPTION - LOCATION
LOCATION: ANKLE;HEAD
LOCATION: ANKLE

## 2023-03-31 ASSESSMENT — PAIN DESCRIPTION - DESCRIPTORS
DESCRIPTORS: ACHING;DISCOMFORT
DESCRIPTORS: ACHING;THROBBING
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
## 2023-03-31 NOTE — GROUP NOTE
Group Therapy Note    Date: 3/31/2023    Group Start Time: 1330  Group End Time: 5715  Group Topic:relaxation group     PRASANNA Flynn        Group Therapy Note    Attendees 7/15    Patient's Goal:  pt will demonstrate improved coping skills and improved social skills     Notes:   pt is pleasant and participated well     Status After Intervention:  Improved    Participation Level:  Active Listener and Interactive    Participation Quality: Appropriate, Attentive, and Supportive      Speech: rapid, hyperverbal      Thought Process/Content: flight of ideas      Affective Functioning: Congruent      Mood: manic      Level of consciousness:  Alert      Response to Learning: Able to verbalize current knowledge/experience and Progressing to goal      Endings: None Reported    Modes of Intervention: Education, Support, and Activity      Discipline Responsible: Psychoeducational Specialist      Signature:  Mercy Kelly

## 2023-03-31 NOTE — GROUP NOTE
Group Therapy Note    Date: 3/31/2023    Group Start Time: 9217  Group End Time: 1130  Group Topic:cognitive skills     [facility]        Group Therapy Note    Attendees 7/17       Patient's Goal:  pt will deomonstrate improved decision making skills and improved coping skills    Notes:   pt is pleasant and participated well     Status After Intervention:  Improved    Participation Level:  Active Listener and Interactive    Participation Quality: intrusive      Speech:  hyperverbal , loud      Thought Process/Content: Logical      Affective Functioning: Congruent      Mood: euthymic      Level of consciousness:  Alert      Response to Learning: Able to verbalize current knowledge/experience and Progressing to goal      Endings: None Reported    Modes of Intervention: Education, Support, and Activity      Discipline Responsible: Psychoeducational Specialist      Signature:  Nasra Carrillo

## 2023-04-01 LAB
CA-I BLD-SCNC: 1.42 MMOL/L (ref 1.1–1.33)
CHOLEST SERPL-MCNC: 194 MG/DL
CHOLESTEROL/HDL RATIO: 4.4
HDLC SERPL-MCNC: 44 MG/DL
LDLC SERPL CALC-MCNC: 131 MG/DL (ref 0–130)
PTH-INTACT SERPL-MCNC: 44.1 PG/ML (ref 14–72)
TRIGL SERPL-MCNC: 95 MG/DL

## 2023-04-01 PROCEDURE — 83519 RIA NONANTIBODY: CPT

## 2023-04-01 PROCEDURE — 83970 ASSAY OF PARATHORMONE: CPT

## 2023-04-01 PROCEDURE — 6370000000 HC RX 637 (ALT 250 FOR IP)

## 2023-04-01 PROCEDURE — 80061 LIPID PANEL: CPT

## 2023-04-01 PROCEDURE — 6370000000 HC RX 637 (ALT 250 FOR IP): Performed by: INTERNAL MEDICINE

## 2023-04-01 PROCEDURE — 83036 HEMOGLOBIN GLYCOSYLATED A1C: CPT

## 2023-04-01 PROCEDURE — 99232 SBSQ HOSP IP/OBS MODERATE 35: CPT | Performed by: PSYCHIATRY & NEUROLOGY

## 2023-04-01 PROCEDURE — 36415 COLL VENOUS BLD VENIPUNCTURE: CPT

## 2023-04-01 PROCEDURE — 1240000000 HC EMOTIONAL WELLNESS R&B

## 2023-04-01 PROCEDURE — 6370000000 HC RX 637 (ALT 250 FOR IP): Performed by: PSYCHIATRY & NEUROLOGY

## 2023-04-01 PROCEDURE — 99232 SBSQ HOSP IP/OBS MODERATE 35: CPT | Performed by: INTERNAL MEDICINE

## 2023-04-01 PROCEDURE — 82330 ASSAY OF CALCIUM: CPT

## 2023-04-01 RX ORDER — ARIPIPRAZOLE 15 MG/1
15 TABLET ORAL DAILY
Status: DISCONTINUED | OUTPATIENT
Start: 2023-04-02 | End: 2023-04-02

## 2023-04-01 RX ADMIN — ALUMINUM HYDROXIDE, MAGNESIUM HYDROXIDE, AND SIMETHICONE 30 ML: 200; 200; 20 SUSPENSION ORAL at 08:41

## 2023-04-01 RX ADMIN — APIXABAN 5 MG: 5 TABLET, FILM COATED ORAL at 08:39

## 2023-04-01 RX ADMIN — ACETAMINOPHEN 650 MG: 325 TABLET ORAL at 20:38

## 2023-04-01 RX ADMIN — HYDROXYZINE HYDROCHLORIDE 50 MG: 50 TABLET, FILM COATED ORAL at 18:09

## 2023-04-01 RX ADMIN — NICOTINE POLACRILEX 2 MG: 2 GUM, CHEWING BUCCAL at 18:09

## 2023-04-01 RX ADMIN — ARIPIPRAZOLE 10 MG: 10 TABLET ORAL at 08:39

## 2023-04-01 RX ADMIN — APIXABAN 5 MG: 5 TABLET, FILM COATED ORAL at 20:39

## 2023-04-01 RX ADMIN — HYDROXYZINE HYDROCHLORIDE 50 MG: 50 TABLET, FILM COATED ORAL at 08:41

## 2023-04-01 ASSESSMENT — PAIN DESCRIPTION - DESCRIPTORS: DESCRIPTORS: ACHING;STABBING

## 2023-04-01 ASSESSMENT — PAIN SCALES - GENERAL
PAINLEVEL_OUTOF10: 3
PAINLEVEL_OUTOF10: 5
PAINLEVEL_OUTOF10: 10

## 2023-04-01 ASSESSMENT — PAIN DESCRIPTION - LOCATION: LOCATION: ANKLE

## 2023-04-01 ASSESSMENT — PAIN - FUNCTIONAL ASSESSMENT: PAIN_FUNCTIONAL_ASSESSMENT: ACTIVITIES ARE NOT PREVENTED

## 2023-04-01 ASSESSMENT — PAIN DESCRIPTION - ORIENTATION: ORIENTATION: LEFT

## 2023-04-01 NOTE — GROUP NOTE
Group Therapy Note    Date: 4/1/2023    Group Start Time: 1330  Group End Time: 2886  Group Topic: Cognitive Skills    [facility]    PRASANNA Robbins        Group Therapy Note    Attendees:4/16       Patient's Goal:  pt will demonstrate improved coping skills and improved concentration     Notes:   pt was pleasant and participated well     Status After Intervention:  Improved    Participation Level:  Active Listener and Interactive    Participation Quality: Appropriate and Supportive      Speech:  normal      Thought Process/Content: Logical      Affective Functioning: Congruent      Mood: euthymic      Level of consciousness:  Alert      Response to Learning: Able to verbalize current knowledge/experience and Progressing to goal      Endings: None Reported    Modes of Intervention: Education, Support, and Problem-solving      Discipline Responsible: Psychoeducational Specialist      Signature:  Jesse Powers

## 2023-04-01 NOTE — GROUP NOTE
Group Therapy Note    Date: 4/1/2023    Group Start Time: 0900  Group End Time: 0915  Group Topic: Community Meeting    TARIQ Vera        Group Therapy Note    Attendees: 8/18       Patient's Goal:  To stay positive       Status After Intervention:  Improved    Participation Level:  Active Listener and Interactive    Participation Quality: Appropriate, Attentive, and Sharing      Speech:  loud      Thought Process/Content: Flight of ideas      Affective Functioning: Exaggerated      Mood: anxious      Level of consciousness:  Alert and Oriented x4      Response to Learning: Able to verbalize current knowledge/experience and Able to verbalize/acknowledge new learning      Endings: None Reported    Modes of Intervention: Education and Support      Discipline Responsible: [address] [employer]      Signature:  Galdino Demarco

## 2023-04-02 LAB
EST. AVERAGE GLUCOSE BLD GHB EST-MCNC: 111 MG/DL
HBA1C MFR BLD: 5.5 % (ref 4–6)

## 2023-04-02 PROCEDURE — 1240000000 HC EMOTIONAL WELLNESS R&B

## 2023-04-02 PROCEDURE — 99232 SBSQ HOSP IP/OBS MODERATE 35: CPT | Performed by: INTERNAL MEDICINE

## 2023-04-02 PROCEDURE — 6370000000 HC RX 637 (ALT 250 FOR IP)

## 2023-04-02 PROCEDURE — 6370000000 HC RX 637 (ALT 250 FOR IP): Performed by: PSYCHIATRY & NEUROLOGY

## 2023-04-02 PROCEDURE — 6370000000 HC RX 637 (ALT 250 FOR IP): Performed by: INTERNAL MEDICINE

## 2023-04-02 RX ORDER — ARIPIPRAZOLE 15 MG/1
15 TABLET ORAL NIGHTLY
Status: DISCONTINUED | OUTPATIENT
Start: 2023-04-02 | End: 2023-04-06 | Stop reason: HOSPADM

## 2023-04-02 RX ADMIN — ACETAMINOPHEN 650 MG: 325 TABLET ORAL at 08:37

## 2023-04-02 RX ADMIN — NICOTINE POLACRILEX 2 MG: 2 GUM, CHEWING BUCCAL at 10:47

## 2023-04-02 RX ADMIN — HYDROXYZINE HYDROCHLORIDE 50 MG: 50 TABLET, FILM COATED ORAL at 20:38

## 2023-04-02 RX ADMIN — APIXABAN 5 MG: 5 TABLET, FILM COATED ORAL at 20:38

## 2023-04-02 RX ADMIN — NICOTINE POLACRILEX 2 MG: 2 GUM, CHEWING BUCCAL at 12:29

## 2023-04-02 RX ADMIN — ALUMINUM HYDROXIDE, MAGNESIUM HYDROXIDE, AND SIMETHICONE 30 ML: 200; 200; 20 SUSPENSION ORAL at 20:38

## 2023-04-02 RX ADMIN — ALUMINUM HYDROXIDE, MAGNESIUM HYDROXIDE, AND SIMETHICONE 30 ML: 200; 200; 20 SUSPENSION ORAL at 08:38

## 2023-04-02 RX ADMIN — IBUPROFEN 400 MG: 400 TABLET, FILM COATED ORAL at 12:50

## 2023-04-02 RX ADMIN — ARIPIPRAZOLE 15 MG: 15 TABLET ORAL at 20:38

## 2023-04-02 RX ADMIN — APIXABAN 5 MG: 5 TABLET, FILM COATED ORAL at 08:32

## 2023-04-02 RX ADMIN — ACETAMINOPHEN 650 MG: 325 TABLET ORAL at 14:37

## 2023-04-02 ASSESSMENT — PAIN SCALES - GENERAL
PAINLEVEL_OUTOF10: 10
PAINLEVEL_OUTOF10: 10
PAINLEVEL_OUTOF10: 4
PAINLEVEL_OUTOF10: 10

## 2023-04-02 ASSESSMENT — PAIN DESCRIPTION - DESCRIPTORS
DESCRIPTORS: ACHING;DISCOMFORT
DESCRIPTORS: ACHING;DISCOMFORT

## 2023-04-02 ASSESSMENT — PAIN DESCRIPTION - ORIENTATION
ORIENTATION: LEFT
ORIENTATION: LEFT

## 2023-04-02 ASSESSMENT — PAIN DESCRIPTION - LOCATION
LOCATION: ANKLE;ABDOMEN
LOCATION: HEAD
LOCATION: FOOT
LOCATION: ABDOMEN

## 2023-04-02 ASSESSMENT — PAIN - FUNCTIONAL ASSESSMENT
PAIN_FUNCTIONAL_ASSESSMENT: ACTIVITIES ARE NOT PREVENTED
PAIN_FUNCTIONAL_ASSESSMENT: ACTIVITIES ARE NOT PREVENTED

## 2023-04-02 ASSESSMENT — PAIN SCALES - WONG BAKER
WONGBAKER_NUMERICALRESPONSE: 4
WONGBAKER_NUMERICALRESPONSE: 2

## 2023-04-02 NOTE — GROUP NOTE
Group Therapy Note    Date: 4/2/2023    Group Start Time: 1400  Group End Time: 1450  Group Topic: Psychoeducation    [facility]    PRASANNA Hendrix        Group Therapy Note    Attendees: 7/18       Patient's Goal:  To improve interpersonal skills and coping skills awareness through collaborating with peers and demonstrating self-expression. Notes:  Patient attended and participated in group. Patient was able to collaborate with peers and demonstrate self-expression. Patient was intrusive at times, as evidenced by, talking over peers/writer - patient responded to redirection. Patient was pleasant. Status After Intervention:  Improved    Participation Level: Active Listener and Interactive - Monopolizing at times, responded to redirection. Participation Quality: Attentive and Sharing - Intrusive at times, responded to redirection. Speech:  pressured      Thought Process/Content: Logical to task       Affective Functioning: Blunted, brightened       Mood: anxious      Level of consciousness:  Alert. Attentive to task. Response to Learning: Able to verbalize current knowledge/experience, Able to retain information, Capable of insight, and Progressing to goal      Endings: Patient endorsed suicidal ideation.  Patient stated she felt safe in the hospital.     Modes of Intervention: Support, Socialization, and Exploration      Discipline Responsible: Psychoeducational Specialist      Signature:  Alejandro Zhong

## 2023-04-02 NOTE — GROUP NOTE
Group Therapy Note    Date: 4/1/2023    Group Start Time: 2005  Group End Time: 2030  Group Topic: Wrap-Up    [facility]    Giorgi Darden RN      Group Therapy Note    Attendees: 4/18       Patient's Goal:    1. To be able to reflect on daily unit activities/experiences. 2.  To review accomplished daily goals and be encouraged to set new goals for the next day. 3.  To improve interpersonal interaction through socialization. Patient's Goal:    1. To review accomplished daily goals and be encouraged to set new goals for the next day. 2.  To reduce anxiety and improve mood by focusing on positive life experiences. 3.  To improve interpersonal interaction through socialization. Notes:  Pt attended and actively participated in Wrap-Up/Goal Review group this evening. Status After Intervention:  Improved     Participation Level:  Active Listener and Interactive     Participation Quality: Appropriate, Attentive and Sharing     Speech:  normal     Thought Process/Content: Logical     Affective Functioning: Congruent     Mood: euthymic     Level of consciousness:  Alert, Oriented x4 and Attentive     Response to Learning: Able to verbalize current knowledge/experience, Able to verbalize/acknowledge new learning, Progressing to goal     Endings: None Reported     Modes of Intervention: Education, Support and Socialization     Discipline Responsible: Registered Nurse        Signature:  Giorgi Darden RN

## 2023-04-02 NOTE — GROUP NOTE
Group Therapy Note    Date: 4/1/2023    Group Start Time: 2030  Group End Time: 2100  Group Topic: Relaxation    [facility]    Arely Neal RN      Group Therapy Note    Attendees: 9/18       Patient's Goal:  To acquire coping skills by developing relaxation techniques    Notes:  Pt attended and actively participated in the Relaxation group this evening.     Status After Intervention:  Improved    Participation Level: Interactive    Participation Quality: Appropriate and Attentive    Speech:  normal    Thought Process/Content: Logical    Affective Functioning: Congruent    Mood: calm and attempting to relax    Level of consciousness:  Alert and Oriented x4    Response to Learning: Progressing to goal    Endings: None Reported    Modes of Intervention: Education, Support, and Exploration    Discipline Responsible: Registered Nurse        Signature:  Arely Neal RN

## 2023-04-02 NOTE — GROUP NOTE
Group Therapy Note    Date: 4/2/2023    Group Start Time: 0900  Group End Time: 0920  Group Topic: Community Meeting    TARIQ Marsh LPN           Patient's Goal:  keep anxiety and depression controlled        Status After Intervention:  Unchanged    Participation Level: Monopolizing    Participation Quality: Sharing      Speech:  pressured      Thought Process/Content: Flight of ideas      Affective Functioning: Flat      Mood: anxious      Level of consciousness:  Alert      Response to Learning: Able to verbalize current knowledge/experience      Endings: None Reported    Modes of Intervention: Support      Discipline Responsible: Licensed Practical Nurse      Signature:  Nalini Lee LPN

## 2023-04-03 ENCOUNTER — APPOINTMENT (OUTPATIENT)
Dept: NEUROLOGY | Age: 23
DRG: 751 | End: 2023-04-03
Payer: COMMERCIAL

## 2023-04-03 LAB
ANION GAP SERPL CALCULATED.3IONS-SCNC: 11 MMOL/L (ref 9–17)
BUN SERPL-MCNC: 12 MG/DL (ref 6–20)
CALCIUM SERPL-MCNC: 11.3 MG/DL (ref 8.6–10.4)
CHLORIDE SERPL-SCNC: 97 MMOL/L (ref 98–107)
CO2 SERPL-SCNC: 25 MMOL/L (ref 20–31)
CREAT SERPL-MCNC: 0.55 MG/DL (ref 0.5–0.9)
GFR SERPL CREATININE-BSD FRML MDRD: >60 ML/MIN/1.73M2
GLUCOSE SERPL-MCNC: 123 MG/DL (ref 70–99)
POTASSIUM SERPL-SCNC: 4.2 MMOL/L (ref 3.7–5.3)
SODIUM SERPL-SCNC: 133 MMOL/L (ref 135–144)

## 2023-04-03 PROCEDURE — 6370000000 HC RX 637 (ALT 250 FOR IP): Performed by: NURSE PRACTITIONER

## 2023-04-03 PROCEDURE — 95819 EEG AWAKE AND ASLEEP: CPT

## 2023-04-03 PROCEDURE — 36415 COLL VENOUS BLD VENIPUNCTURE: CPT

## 2023-04-03 PROCEDURE — 80048 BASIC METABOLIC PNL TOTAL CA: CPT

## 2023-04-03 PROCEDURE — 6370000000 HC RX 637 (ALT 250 FOR IP): Performed by: PSYCHIATRY & NEUROLOGY

## 2023-04-03 PROCEDURE — 6370000000 HC RX 637 (ALT 250 FOR IP): Performed by: INTERNAL MEDICINE

## 2023-04-03 PROCEDURE — 6370000000 HC RX 637 (ALT 250 FOR IP)

## 2023-04-03 PROCEDURE — 1240000000 HC EMOTIONAL WELLNESS R&B

## 2023-04-03 RX ORDER — LANOLIN ALCOHOL/MO/W.PET/CERES
1.5 CREAM (GRAM) TOPICAL NIGHTLY PRN
Status: DISCONTINUED | OUTPATIENT
Start: 2023-04-03 | End: 2023-04-06 | Stop reason: HOSPADM

## 2023-04-03 RX ORDER — LAMOTRIGINE 25 MG/1
25 TABLET ORAL DAILY
Status: DISCONTINUED | OUTPATIENT
Start: 2023-04-03 | End: 2023-04-06 | Stop reason: HOSPADM

## 2023-04-03 RX ADMIN — IBUPROFEN 400 MG: 400 TABLET, FILM COATED ORAL at 15:22

## 2023-04-03 RX ADMIN — ALUMINUM HYDROXIDE, MAGNESIUM HYDROXIDE, AND SIMETHICONE 30 ML: 200; 200; 20 SUSPENSION ORAL at 08:03

## 2023-04-03 RX ADMIN — APIXABAN 5 MG: 5 TABLET, FILM COATED ORAL at 20:33

## 2023-04-03 RX ADMIN — Medication 1.5 MG: at 20:32

## 2023-04-03 RX ADMIN — LAMOTRIGINE 25 MG: 25 TABLET ORAL at 11:30

## 2023-04-03 RX ADMIN — ARIPIPRAZOLE 15 MG: 15 TABLET ORAL at 20:33

## 2023-04-03 RX ADMIN — ACETAMINOPHEN 650 MG: 325 TABLET ORAL at 20:16

## 2023-04-03 RX ADMIN — HYDROXYZINE HYDROCHLORIDE 50 MG: 50 TABLET, FILM COATED ORAL at 20:33

## 2023-04-03 RX ADMIN — APIXABAN 5 MG: 5 TABLET, FILM COATED ORAL at 08:34

## 2023-04-03 ASSESSMENT — PAIN DESCRIPTION - LOCATION
LOCATION: ANKLE
LOCATION: HEAD;ANKLE

## 2023-04-03 ASSESSMENT — PAIN DESCRIPTION - ORIENTATION: ORIENTATION: LEFT

## 2023-04-03 ASSESSMENT — PAIN SCALES - GENERAL
PAINLEVEL_OUTOF10: 5
PAINLEVEL_OUTOF10: 10
PAINLEVEL_OUTOF10: 10

## 2023-04-03 ASSESSMENT — PAIN DESCRIPTION - DESCRIPTORS
DESCRIPTORS: ACHING
DESCRIPTORS: ACHING

## 2023-04-03 NOTE — GROUP NOTE
Group Therapy Note    Date: 4/3/2023    Group Start Time: 8950  Group End Time: 58  Group Topic: Cognitive Skills    [facility] PRASANNA aCusey        Group Therapy Note    Attendees: 5/20       Patient's Goal:  pt will demonstrate improved coping skills and improved communication skills     Notes:   pt was pleasant and participated well     Status After Intervention:  Improved    Participation Level:  Active Listener and Interactive    Participation Quality: Appropriate and Supportive      Speech:  normal      Thought Process/Content: Logical      Affective Functioning: Congruent      Mood: euthymic      Level of consciousness:  Alert      Response to Learning: Able to verbalize current knowledge/experience and Progressing to goal      Endings: None Reported    Modes of Intervention: Education, Support, and Problem-solving      Discipline Responsible: Psychoeducational Specialist      Signature:  Trevon Hernandez

## 2023-04-03 NOTE — GROUP NOTE
Group Therapy Note    Date: 4/3/2023    Group Start Time: 1430  Group End Time:1510  Group Topic: relaxation group     [facility] Barth, CTRS        Group Therapy Note    Attendees: 5/18       Patient's Goal:  pt will identify the benefits of using art for relaxation / coping       Notes:   pt was pleasant and participated well     Status After Intervention:  Improved    Participation Level:  Active Listener and Interactive    Participation Quality: Appropriate and Supportive      Speech:  normal      Thought Process/Content: Logical      Affective Functioning: Congruent      Mood: euthymic      Level of consciousness:  Alert      Response to Learning: Able to verbalize current knowledge/experience and Progressing to goal      Endings: None Reported    Modes of Intervention: Education, Support, and Problem-solving      Discipline Responsible: Psychoeducational Specialist      Signature:  Salo Segovia

## 2023-04-03 NOTE — GROUP NOTE
Group Therapy Note    Date: 4/3/2023    Group Start Time: 0915  Group End Time: 0930  Group Topic: Community Meeting    TARIQ Shay LPN       Patient refused to attend 9 am community meeting, due to resting in bed.  Refused 1;1 talk time      Signature:  Mala Alcala LPN

## 2023-04-04 PROCEDURE — 6370000000 HC RX 637 (ALT 250 FOR IP): Performed by: INTERNAL MEDICINE

## 2023-04-04 PROCEDURE — 6370000000 HC RX 637 (ALT 250 FOR IP): Performed by: NURSE PRACTITIONER

## 2023-04-04 PROCEDURE — 1240000000 HC EMOTIONAL WELLNESS R&B

## 2023-04-04 PROCEDURE — 6370000000 HC RX 637 (ALT 250 FOR IP)

## 2023-04-04 PROCEDURE — 99232 SBSQ HOSP IP/OBS MODERATE 35: CPT | Performed by: INTERNAL MEDICINE

## 2023-04-04 PROCEDURE — 6370000000 HC RX 637 (ALT 250 FOR IP): Performed by: PSYCHIATRY & NEUROLOGY

## 2023-04-04 RX ADMIN — APIXABAN 5 MG: 5 TABLET, FILM COATED ORAL at 08:09

## 2023-04-04 RX ADMIN — ACETAMINOPHEN 650 MG: 325 TABLET ORAL at 14:20

## 2023-04-04 RX ADMIN — IBUPROFEN 400 MG: 400 TABLET, FILM COATED ORAL at 20:48

## 2023-04-04 RX ADMIN — Medication 1.5 MG: at 20:45

## 2023-04-04 RX ADMIN — LAMOTRIGINE 25 MG: 25 TABLET ORAL at 08:09

## 2023-04-04 RX ADMIN — APIXABAN 5 MG: 5 TABLET, FILM COATED ORAL at 20:45

## 2023-04-04 RX ADMIN — HYDROXYZINE HYDROCHLORIDE 50 MG: 50 TABLET, FILM COATED ORAL at 20:45

## 2023-04-04 RX ADMIN — ARIPIPRAZOLE 15 MG: 15 TABLET ORAL at 20:45

## 2023-04-04 RX ADMIN — ALUMINUM HYDROXIDE, MAGNESIUM HYDROXIDE, AND SIMETHICONE 30 ML: 200; 200; 20 SUSPENSION ORAL at 08:09

## 2023-04-04 ASSESSMENT — PAIN DESCRIPTION - ORIENTATION: ORIENTATION: LEFT

## 2023-04-04 ASSESSMENT — PAIN SCALES - GENERAL
PAINLEVEL_OUTOF10: 6
PAINLEVEL_OUTOF10: 5
PAINLEVEL_OUTOF10: 2

## 2023-04-04 ASSESSMENT — PAIN DESCRIPTION - LOCATION
LOCATION: ANKLE
LOCATION: ANKLE

## 2023-04-04 ASSESSMENT — PAIN DESCRIPTION - DESCRIPTORS: DESCRIPTORS: STABBING

## 2023-04-04 NOTE — GROUP NOTE
Group Therapy Note    Date: 4/4/2023    Group Start Time: 1330  Group End Time: 2883  Group Topic: relaxation group    TARIQ Orozco Shown, CTRS        Group Therapy Note    Attendees: 5/20       Patient's Goal:  pt will identify the benefits of using art for relaxation and coping     Notes:   pt was pleasant and participated well     Status After Intervention:  Improved    Participation Level:  Active Listener and Interactive    Participation Quality: Supportive and Intrusive      Speech:  normal      Thought Process/Content: logical      Affective Functioning: Congruent      Mood: euthymic      Level of consciousness:  Alert      Response to Learning: Able to verbalize current knowledge/experience and Progressing to goal      Endings: None Reported    Modes of Intervention: Support and Activity      Discipline Responsible: Psychoeducational Specialist      Signature:  Ron Mart

## 2023-04-04 NOTE — PROCEDURES
PATIENT:   Cristobal Leal  READING PHYSICIAN:  Chuyita Cardoza     TECHNIQUE:  22 channels of EEG and 1 channel of EKG were recorded utilizing the International 10/20 System. CLINICAL HISTORY: This is a 23 y.o. female with The encounter diagnosis was Depression with suicidal ideation. .  EEG is being performed to evaluate for epileptiform activity. Medications: Scheduled Meds:   lamoTRIgine  25 mg Oral Daily    ARIPiprazole  15 mg Oral Nightly    apixaban  5 mg Oral BID           EEG FINDINGS:   The background activity consisted of a mix of alpha and theta activity. There were bursts of generalized slowing seen occasionally throughout the recording. At times bi-hemispheric sharp activity was seen. Intermittent eye blink and movement artifact were seen during the tracing. Sleep was not obtained. EKG lead showed normal sinus rhythm. ACTIVATION: Hyperventilation: Not done. Photic stimulation: Not done                 IMPRESSION:  This is an abnormal EEG due to the presence of bi-hemispheric sharp wave activity seen and busts of generalized slowing. No clinical or electrographic seizures were recorded during the study. Clinical correlation is recommended.  I hope you find this information helpful

## 2023-04-04 NOTE — GROUP NOTE
Group Therapy Note    Date: 4/4/2023    Group Start Time: 5441  Group End Time: 3476  Group Topic: Cognitive Skills    TARIQ Jiménez Ma, CTRS        Group Therapy Note    Attendees: 5/18       Patient's Goal:  pt will demonstrate improved concentration and improved decision making skills     Notes:   pt was pleasant but intrusive during group     Status After Intervention:  Improved    Participation Level:  Active Listener and Interactive    Participation Quality: Supportive and Intrusive      Speech:  loud      Thought Process/Content: Flight of ideas      Affective Functioning: Congruent      Mood: euthymic      Level of consciousness:  Alert      Response to Learning: Able to verbalize current knowledge/experience and Progressing to goal      Endings: None Reported    Modes of Intervention: Support and Activity      Discipline Responsible: Psychoeducational Specialist      Signature:  Sharri Green

## 2023-04-04 NOTE — CARE COORDINATION
SOCIAL SERVICE NOTE: JIMENA telephones The [address] [place] [address] [phone] per patient request, to check and make sure that Patient is able to return admissions coordinator to the shelter at discharge. Staff reports that SW needs to call Prema Hidalgo at [phone] on the day that patient is discharged to make sure she is aware. Also staff reported that Patient needs to provide staff her Discharge Summary that shows that she has been hospitalized since 3/29/2023, which is the day that she left the shelter. JIMENA will continue to monitor the situation.

## 2023-04-05 VITALS
TEMPERATURE: 98 F | WEIGHT: 140 LBS | BODY MASS INDEX: 23.32 KG/M2 | HEART RATE: 98 BPM | RESPIRATION RATE: 14 BRPM | SYSTOLIC BLOOD PRESSURE: 124 MMHG | HEIGHT: 65 IN | OXYGEN SATURATION: 100 % | DIASTOLIC BLOOD PRESSURE: 74 MMHG

## 2023-04-05 PROCEDURE — 6370000000 HC RX 637 (ALT 250 FOR IP): Performed by: NURSE PRACTITIONER

## 2023-04-05 PROCEDURE — 1240000000 HC EMOTIONAL WELLNESS R&B

## 2023-04-05 PROCEDURE — 6370000000 HC RX 637 (ALT 250 FOR IP): Performed by: INTERNAL MEDICINE

## 2023-04-05 PROCEDURE — 6370000000 HC RX 637 (ALT 250 FOR IP): Performed by: PSYCHIATRY & NEUROLOGY

## 2023-04-05 PROCEDURE — 6370000000 HC RX 637 (ALT 250 FOR IP)

## 2023-04-05 RX ORDER — LAMOTRIGINE 25 MG/1
25 TABLET ORAL DAILY
Qty: 30 TABLET | Refills: 0 | Status: SHIPPED | OUTPATIENT
Start: 2023-04-06

## 2023-04-05 RX ORDER — ARIPIPRAZOLE 15 MG/1
15 TABLET ORAL NIGHTLY
Qty: 30 TABLET | Refills: 0 | Status: SHIPPED | OUTPATIENT
Start: 2023-04-05

## 2023-04-05 RX ORDER — HYDROXYZINE 50 MG/1
50 TABLET, FILM COATED ORAL 3 TIMES DAILY PRN
Qty: 30 TABLET | Refills: 0 | Status: SHIPPED | OUTPATIENT
Start: 2023-04-05 | End: 2023-04-15

## 2023-04-05 RX ADMIN — APIXABAN 5 MG: 5 TABLET, FILM COATED ORAL at 20:45

## 2023-04-05 RX ADMIN — Medication 1.5 MG: at 20:57

## 2023-04-05 RX ADMIN — HYDROXYZINE HYDROCHLORIDE 50 MG: 50 TABLET, FILM COATED ORAL at 20:45

## 2023-04-05 RX ADMIN — ARIPIPRAZOLE 15 MG: 15 TABLET ORAL at 20:45

## 2023-04-05 RX ADMIN — ACETAMINOPHEN 650 MG: 325 TABLET ORAL at 19:20

## 2023-04-05 RX ADMIN — APIXABAN 5 MG: 5 TABLET, FILM COATED ORAL at 07:46

## 2023-04-05 RX ADMIN — LAMOTRIGINE 25 MG: 25 TABLET ORAL at 07:46

## 2023-04-05 ASSESSMENT — PAIN DESCRIPTION - DESCRIPTORS: DESCRIPTORS: STABBING

## 2023-04-05 ASSESSMENT — PAIN DESCRIPTION - LOCATION: LOCATION: ANKLE

## 2023-04-05 ASSESSMENT — PAIN DESCRIPTION - ORIENTATION: ORIENTATION: LEFT

## 2023-04-05 ASSESSMENT — PAIN SCALES - GENERAL: PAINLEVEL_OUTOF10: 7

## 2023-04-05 NOTE — GROUP NOTE
Group Therapy Note    Date: 4/5/2023    Group Start Time: 1255  Group End Time: 1115  Group Topic: Cognitive Skills    PRASANNA Bowles        Group Therapy Note    Attendees: 3/16       Patient's Goal:  pt will demonstrate improved concentration and improved coping skills     Notes:  pt was pleasant and participated well     Status After Intervention:  Improved    Participation Level:  Active Listener and Interactive    Participation Quality: Appropriate and Supportive      Speech:  normal      Thought Process/Content: Logical      Affective Functioning: Congruent      Mood: euthymic      Level of consciousness:  Alert      Response to Learning: Able to verbalize current knowledge/experience and Progressing to goal      Endings: None Reported    Modes of Intervention: Education, Support, and Socialization      Discipline Responsible: Psychoeducational Specialist      Signature:  Kemal Harris

## 2023-04-05 NOTE — GROUP NOTE
Group Therapy Note    Date: 4/5/2023    Group Start Time: 0900  Group End Time: 0930  Group Topic: Community Meeting    TARIQ Winkler        Group Therapy Note    Attendees: 3/18       Patient's Goal:  Talk to doctor about discharge    Notes:  talkative    Status After Intervention:  Unchanged    Participation Level: Active Listener and Interactive    Participation Quality: Appropriate and Attentive      Speech:  normal      Thought Process/Content: Logical      Affective Functioning: Congruent      Mood: anxious      Level of consciousness:  Oriented x4      Response to Learning: Able to verbalize current knowledge/experience and Progressing to goal      Endings: None Reported    Modes of Intervention: Education, Support, and Socialization      Discipline Responsible: Behavorial Health Tech      Signature:   Benson Winkler

## 2023-04-06 PROCEDURE — 6370000000 HC RX 637 (ALT 250 FOR IP): Performed by: PSYCHIATRY & NEUROLOGY

## 2023-04-06 PROCEDURE — 6370000000 HC RX 637 (ALT 250 FOR IP): Performed by: NURSE PRACTITIONER

## 2023-04-06 PROCEDURE — 6370000000 HC RX 637 (ALT 250 FOR IP): Performed by: INTERNAL MEDICINE

## 2023-04-06 RX ADMIN — LAMOTRIGINE 25 MG: 25 TABLET ORAL at 08:20

## 2023-04-06 RX ADMIN — APIXABAN 5 MG: 5 TABLET, FILM COATED ORAL at 08:20

## 2023-04-06 RX ADMIN — NICOTINE POLACRILEX 2 MG: 2 GUM, CHEWING BUCCAL at 12:27

## 2023-04-06 NOTE — GROUP NOTE
Group Therapy Note    Date: 4/6/2023    Group Start Time: 2070  Group End Time: 1115  Group Topic: Cognitive Skills    [facility]        Group Therapy Note    Attendees: 6/20       Patient's Goal:  pt will demonstrate improved coping skills and improved decision making skills     Notes:   pt was pleasant and participated well     Status After Intervention:  Improved    Participation Level:  Active Listener and Interactive    Participation Quality: Appropriate and Supportive      Speech:  normal      Thought Process/Content: Logical      Affective Functioning: Congruent      Mood: euthymic      Level of consciousness:  Alert      Response to Learning: Able to verbalize current knowledge/experience and Progressing to goal      Endings: None Reported    Modes of Intervention: Support, Socialization, and Problem-solving      Discipline Responsible: Psychoeducational Specialist      Signature:  Nasra Carrillo
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
## 2023-04-06 NOTE — CARE COORDINATION
Name: Mary Carmen Ragland    : 2000    Discharge Date: 2023    Primary Auth/Cert #: [id number]    [facility]ination: [facility]    Discharge Medications:      Medication List        START taking these medications      ARIPiprazole 15 MG tablet  Commonly known as: ABILIFY  Take 1 tablet by mouth at bedtime  Notes to patient: Mood stabilization      lamoTRIgine 25 MG tablet  Commonly known as: LAMICTAL  Take 1 tablet by mouth daily  Notes to patient: Depression             CHANGE how you take these medications      apixaban 5 MG Tabs tablet  Commonly known as: Eliquis  Take 1 tablet by mouth 2 times daily  What changed: Another medication with the same name was removed. Continue taking this medication, and follow the directions you see here.   Notes to patient: Blood thinner      hydrOXYzine HCl 50 MG tablet  Commonly known as: ATARAX  Take 1 tablet by mouth 3 times daily as needed for Anxiety  What changed:   medication strength  how much to take  when to take this  reasons to take this  Notes to patient: anxiety            STOP taking these medications      acetaminophen 500 MG tablet  Commonly known as: TYLENOL     ibuprofen 800 MG tablet  Commonly known as: IBU     levothyroxine 50 MCG tablet  Commonly known as: SYNTHROID     ondansetron 4 MG tablet  Commonly known as: Zofran     QUEtiapine 100 MG tablet  Commonly known as: SEROQUEL     sertraline 100 MG tablet  Commonly known as: ZOLOFT               Where to Get Your Medications        These medications were sent to [facility]  [address] [address]      Phone: [phone]   apixaban 5 MG Tabs tablet  ARIPiprazole 15 MG tablet  hydrOXYzine HCl 50 MG tablet  lamoTRIgine 25 MG tablet         Follow Up Appointment: [facility]  [address]  Ph: [phone]  Fax: [phone]  Go on 2023  Please attend a walk-in

## 2023-04-06 NOTE — DISCHARGE INSTRUCTIONS
Information:  Medications:   Medication summary provided   I understand that I should take only the medications on my list.     -why and when I need to take each medicine.     -which side effects to watch for.     -that I should carry my medication information at all times in case of     Emergency situations. I will take all of my medicines to follow up appointments.     -check with my physician or pharmacist before taking any new    Medication, over the counter product or drink alcohol.    -Ask about food, drug or dietary supplement interactions.    -discard old lists and update records with medication providers. Notify Physician:  Notify physician if you notice:   Always call 911 if you feel your life is in danger  In case of an emergency call 911 immediately! If 911 is not available call your local emergency medical system for help    Behavioral Health Follow Up:  Original Referral Source:[facility]  Discharge Diagnosis: Depression with suicidal ideation [F32. A, R45.851]  Recommendations for Level of Care: compliant with medications and follow up appointments   Patient status at discharge: alert and oriented denies any thoughts to harm self   My hospital  was: [facility]  Aftercare plan faxed: [phone] [facility]    -faxed by: staff   -date: 04/06   -time: 1300  Prescriptions: filled and sent with her       Learning About COVID-19 and Flu Symptoms  How can you tell COVID-19 from the flu? COVID-19 and the flu have similar symptoms. The two can be hard to tell apart. The only way to know for sure which illness you have is to be tested. If you have questions about COVID-19 testing, ask your doctor or go to cdc.gov to use the COVID-19 Viral Testing Tool. Since the symptoms are so alike, it makes sense to act as if you have COVID-19 until your test results come back. This means staying home and limiting contact with people in your home.  You'll need to wash your hands often and disinfect surfaces that you

## 2023-04-06 NOTE — GROUP NOTE
Group Therapy Note    Date: 4/6/2023    Group Start Time: 0900  Group End Time: 0915  Group Topic: Group Documentation    [facility]    Isis Stone LPN        Group Therapy Note    Attendees: 2/20       Patient's Goal:  Follow my treatment plan, go home, be less angry    Status After Intervention:  Improved    Participation Level:  Active Listener and Interactive    Participation Quality: Appropriate, Attentive, Sharing, and Supportive      Speech:  normal      Thought Process/Content: Logical      Affective Functioning: Congruent      Mood: elevated      Level of consciousness:  Alert, Oriented x4, and Attentive      Response to Learning: Able to verbalize current knowledge/experience, Able to verbalize/acknowledge new learning, Able to retain information, Capable of insight, Able to change behavior, and Progressing to goal      Endings: None Reported    Modes of Intervention: Socialization      Discipline Responsible: Licensed Practical Nurse      Signature:  Isis Stone LPN

## 2023-04-06 NOTE — PROGRESS NOTES
04/05/23 1348   Encounter Summary   Encounter Overview/Reason  Spiritual/Emotional Needs   Service Provided For: Patient   Referral/Consult From: Tiffany   Last Encounter  04/05/23   Complexity of Encounter Moderate   Begin Time 0130   End Time  0145   Total Time Calculated 15 min   Spiritual/Emotional needs   Type Spiritual Support   Behavioral Health    Type  Faith Group   Assessment/Intervention/Outcome   Assessment Calm   Intervention Active listening;Nurtured Hope;Prayer (assurance of)/[place];Sustaining Presence/Ministry of presence   Outcome Receptive;Engaged in conversation
Behavioral Services  Medicare Certification Upon Admission    I certify that this patient's inpatient psychiatric hospital admission is medically necessary for:    [x] (1) Treatment which could reasonably be expected to improve this patient's condition,       [x] (2) Or for diagnostic study;     AND     [x](2) The inpatient psychiatric services are provided while the individual is under the care of a physician and are included in the individualized plan of care.     Estimated length of stay/service 2-9 days    Plan for post-hospital care -outpatient care    Electronically signed by Lawyer Marisa MD on 3/30/2023 at 9:48 AM
CLINICAL PHARMACY NOTE: MEDS TO BEDS    Total # of Prescriptions Filled: 4   The following medications were delivered to the patient:    Aripiprazole 15mg        Eliquis 5mg  Lamotrigine 25mg  Hydroxyzine hcl 50mg    Additional Documentation: Delivered Medication to [facility]  Patient is Eligible to Utilize Meds To Beds for Their Discharge Medications 04/05/23 [facility] 3:47
CLINICAL PHARMACY NOTE: MEDS TO BEDS    Total # of Prescriptions Filled: 4   The following medications were delivered to the patient:  Aripiprazole 15mg  Eliquis 5mg  Lamotrigine 25mg  [id number]    Additional Documentation: Delivered Medication to [facility]
EEG is completed. Results to follow.
[facility]    CONSULTATION / HISTORY AND PHYSICAL EXAMINATION            Date:   4/4/2023  Patient name:  Von De Jesus  Date of admission:  3/29/2023  9:39 PM  MRN:   [id number]  Account:  [de-identified]  YOB: 2000  PCP:    No primary care provider on file. Room:   [address] [place] [address]  Code Status:    Full Code    Physician Requesting Consult: Sarah Jones MD    Reason for Consult:  medical management    Chief Complaint:     Chief Complaint   Patient presents with    Mental Health Problem       History Obtained From:     Patient medical record nursing staff    History of Present Illness:   Patient a past medical history multiple medical problem which include general anxiety disorder, bipolar disorder, PTSD, hypothyroidism, history of PE on anticoagulation. Patient admitted to [facility] floor with worsening depression, suicidal ideation  She told me that she is not taking her thyroid medication for long period of time. Patient, was diagnosed with PE back in July or 2022, when asked, she told me that she was on birth control pills  She is not aware whether she was tested for thrombophilia work-up  No family history of blood clots  Continue to take Eliquis without any side effect, does not know for how long she should be on anticoagulation      Past Medical History:     Past Medical History:   Diagnosis Date    Anxiety     Bipolar 1 disorder ([address]     Heart murmur     PTSD (post-traumatic stress disorder)     Thyroid disease         Past Surgical History:     No past surgical history on file. Medications Prior to Admission:     Prior to Admission medications    Medication Sig Start Date End Date Taking?  Authorizing Provider   apixaban (ELIQUIS) 5 MG TABS tablet Take 5 mg by mouth 2 times daily   Yes Historical Provider, MD   levothyroxine (SYNTHROID) 50 MCG tablet TAKE 1 TABLET BY MOUTH EVERY DAY  Patient not taking: Reported on 3/30/2023 8/15/22
Pharmacy Med Education Group Note    Date: 04/05/23  Start Time: 7573  End Time: 9150    Number Participants in Group:  8    Goal:  Patient will demonstrate an understanding of the medications intended purpose and possible adverse effects  Topic: [place] for Pharmacy Med Ed Group    Discipline Responsible:     OT  AT  [facility].  RT     X Other       Participation Level:     None  Minimal      X Active Listener    X Interactive    Monopolizing         Participation Quality:    X Appropriate  Inappropriate     X       Attentive        Intrusive          Sharing        Resistant          Supportive        Lethargic       Affective:     X Congruent  Incongruent  Blunted  Flat    Constricted  Anxious  Elated  Angry    Labile  Depressed  Other         Cognitive:    X Alert  Oriented PPTP     Concentration   X G  F  P   Attention Span   X G  F  P   Short-Term Memory   X G  F  P   Long-Term Memory  G  F  P   ProblemSolving/  Decision Making  G  F  P   Ability to Process  Information   X G  F  P      Contributing Factors             Delusional             Hallucinating             Flight of Ideas             Other:       Modes of Intervention:    X Education   X Support  Exploration    Clarifying  Problem Solving  Confrontation    Socialization  Limit Setting  Reality Testing    Activity  Movement  Media    Other:            Response to Learning:    X Able to verbalize current knowledge/experience    Able to verbalize/acknowledge new learning    Able to retain information    Capable of insight    Able to change behavior    Progressing to goal    Other:        Comments:      India Shannon, PharmD Candidate [id number]
Pharmacy Medication History Note      List of current medications patient is taking is complete. Source of information: Patient, [facility] [place], [address]), Charis ([place], [address]), [employer] ([place], [place])    Changes made to medication list:  Medications removed (include reason, ex. therapy complete or physician discontinued, noncompliance):  Trazodone (list clean up)  Ibuprofen (therapy completed)  Tylenol (therapy completed)  Zofran (therapy completed)  Hydroxyzine (therapy completed)    Medications added/doses adjusted:  Changed Eliquis from starter pack to 5 mg BID - last filled 3/9/23 for 30 days      Other notes (ex. Recent course of antibiotics, Coumadin dosing):  Patient reports not taking levothyroxine  Marked Levothyroxine, Seroquel, and Zoloft as not taking. No recent fills of levothyroxine or Seroquel, Zoloft last filled 2/11/23 for 30-day supply      Current Home Medication List at Time of Admission:  Prior to Admission medications    Medication Sig Start Date End Date Taking? Authorizing Provider   apixaban (ELIQUIS) 5 MG TABS tablet Take 5 mg by mouth 2 times daily   Yes Historical Provider, MD   levothyroxine (SYNTHROID) 50 MCG tablet TAKE 1 TABLET BY MOUTH EVERY DAY  Patient not taking: Reported on 3/30/2023 8/15/22   Historical Provider, MD   QUEtiapine (SEROQUEL) 100 MG tablet TAKE 1 TABLET BY MOUTH EVERY DAY AT BEDTIME  Patient not taking: Reported on 3/30/2023 7/30/22   Historical Provider, MD   sertraline (ZOLOFT) 100 MG tablet Take 100 mg by mouth daily Pt takes 1.5 tab per day  150mg q morning  Patient not taking: Reported on 3/30/2023    Historical Provider, MD     Please let me know if you have any questions about this encounter. Thank you!     Electronically signed by Jas [facility] on 3/30/2023 at 9:24 AM
RT ASSESSMENT TREATMENT GOALS    [x]Pt Goal:  Pt will identify 1-2 positive coping skills by time of discharge. [x]Pt Goal:  Pt will identify 1-2 positive aspects of self by time of discharge. []Pt Goal:  Pt will remain on task/topic for 15-30 minutes during group by time of discharge. []Pt Goal:  Pt will identify 1-2 aspects of relapse prevention plan by time of discharge. []Pt Goal:  Pt will join in conversation with peers 1-2 times per group by time of discharge. []Pt Goal:  Pt will identify 1-2 new leisure interests by time of discharge. []Pt Goal:  Pt will not voice any delusional content by time of discharge.
[facility] Neurology Specialist  [address]  [place], [address] [facility]:  [phone] or [phone]  FAX:  [phone]            Brief history: Matias Minor is a 23 y.o. old female admitted on 3/29/2023 with seizures     Subjective: The patient believes overnight she had a seizure in her sleep when she had convulsions. She did not notify the nursing staff. Patient denies any new weakness, numbness, tingling or headache. Objective: BP 96/76   Pulse 89   Temp 98.2 °F (36.8 °C) (Temporal)   Resp 14   Ht 5' 5\" (1.651 m)   Wt 140 lb (63.5 kg)   SpO2 100%   BMI 23.30 kg/m²       Medications:    [START ON 4/2/2023] ARIPiprazole  15 mg Oral Daily    apixaban  5 mg Oral BID            Neurological examination:    Mental status   Alert and oriented; intact memory with no confusion, speech or language problems; no hallucinations or delusions     Cranial nerves   II - visual fields intact to confrontation                                                III, IV, VI - extra-ocular muscles full: no pupillary defect; no ARAMIS, no nystagmus, no ptosis                                                                      V - normal facial sensation                                                               VII - normal facial symmetry                                                             VIII - intact hearing                                                                             IX, X - symmetrical palate                                                                  XI - symmetrical shoulder shrug                                                       XII - midline tongue without atrophy or fasciculation     Motor function  Normal muscle bulk and tone; normal power 5/5, including fine motor movements     Sensory function Intact to touch, pin, vibration, proprioception     Cerebellar Intact fine motor movement.  No involuntary movements or tremors     Reflex function Intact 2+ DTR
Historical Provider, MD   QUEtiapine (SEROQUEL) 100 MG tablet TAKE 1 TABLET BY MOUTH EVERY DAY AT BEDTIME  Patient not taking: Reported on 3/30/2023 7/30/22   Historical Provider, MD   sertraline (ZOLOFT) 100 MG tablet Take 100 mg by mouth daily Pt takes 1.5 tab per day  150mg q morning  Patient not taking: Reported on 3/30/2023    Historical Provider, MD        Allergies:     Penicillins and Vyvanse [lisdexamfetamine]    Social History:     Tobacco:    reports that she has never smoked. She has never used smokeless tobacco.  Alcohol:      reports that she does not currently use alcohol. Drug Use:  reports that she does not currently use drugs. Family History:     No family history on file. Review of Systems:     Positive and Negative as described in HPI. CONSTITUTIONAL:  negative for fevers, chills, sweats, fatigue, weight loss  HEENT:  negative for vision, hearing changes, runny nose, throat pain  RESPIRATORY:  negative for shortness of breath, cough, congestion, wheezing. CARDIOVASCULAR:  negative for chest pain, palpitations.   GASTROINTESTINAL:  negative for nausea, vomiting, diarrhea, constipation, change in bowel habits, abdominal pain   GENITOURINARY:  negative for difficulty of urination, burning with urination, frequency   INTEGUMENT:  negative for rash, skin lesions, easy bruising   HEMATOLOGIC/LYMPHATIC:  negative for swelling/edema   ALLERGIC/IMMUNOLOGIC:  negative for urticaria , itching  ENDOCRINE:  negative increase in drinking, increase in urination, hot or cold intolerance  MUSCULOSKELETAL:  negative joint pains, muscle aches, swelling of joints  NEUROLOGICAL:  negative for headaches, dizziness, lightheadedness, numbness, pain, tingling extremities  BEHAVIOR/PSYCH: Depressed    Physical Exam:     /78   Pulse (!) 108   Temp 98 °F (36.7 °C)   Resp 14   Ht 5' 5\" (1.651 m)   Wt 140 lb (63.5 kg)   SpO2 100%   BMI 23.30 kg/m²   Temp (24hrs), Av.1 °F (36.7 °C), Min:98 °F (36.7
rate, and loud volume  Mood:  Anxious  Affect: Mood-congruent  Thought processes: linear and coherent   Thought content:  Denies homicidal ideation  Suicidal Ideation: Fleeting suicidal ideations  Delusions: No evidence of delusions. Perceptual Disturbance: Patient reports improvement in auditory hallucinations. Patient does not appear to be responding to internal stimuli. Cognition: Oriented to self, location, time, and situation  Memory: intact  Insight: poor   Judgement: poor       Data   height is 5' 5\" (1.651 m) and weight is 140 lb (63.5 kg). Her temporal temperature is 98.1 °F (36.7 °C). Her blood pressure is 150/69 (abnormal) and her pulse is 106 (abnormal). Her respiration is 14 and oxygen saturation is 100%. Labs:   Admission on 03/29/2023   Component Date Value Ref Range Status    Acetaminophen Level 03/29/2023 <5 (L)  10 - 30 ug/mL Final    Ethanol 03/29/2023 <10  <10 mg/dL Final    Ethanol percent 03/29/2023 <0.799  % Final    Salicylate Lvl 54/50/6918 <1 (L)  3 - 10 mg/dL Final    Toxic Tricyclic Sc,Blood 33/76/8944 WRONG TEST ORDERED  NEGATIVE Final    WBC 03/29/2023 8.4  4.5 - 13.5 k/uL Final    RBC 03/29/2023 4.48  4.0 - 5.2 m/uL Final    Hemoglobin 03/29/2023 12.0  12.0 - 16.0 g/dL Final    Hematocrit 03/29/2023 36.0  36 - 46 % Final    MCV 03/29/2023 80.3  80 - 100 fL Final    MCH 03/29/2023 26.8  26 - 34 pg Final    MCHC 03/29/2023 33.4  31 - 37 g/dL Final    RDW 03/29/2023 15.4 (H)  11.5 - 14.9 % Final    Platelets 52/73/7743 296  150 - 450 k/uL Final    MPV 03/29/2023 7.4  6.0 - 12.0 fL Final    Glucose 03/29/2023 100 (H)  70 - 99 mg/dL Final    BUN 03/29/2023 21 (H)  6 - 20 mg/dL Final    Creatinine 03/29/2023 0.65  0.50 - 0.90 mg/dL Final    Est, Glom Filt Rate 03/29/2023 >60  >60 mL/min/1.73m2 Final    Comment:       These results are not intended for use in patients <25years of age. eGFR results are calculated without a race factor using the 2021 CKD-EPI equation.   Careful
responding to internal stimuli. Cognition: Oriented to self, location, time, and situation  Memory: intact  Insight: poor   Judgement: poor       Data   height is 5' 5\" (1.651 m) and weight is 140 lb (63.5 kg). Her oral temperature is 97.7 °F (36.5 °C). Her blood pressure is 124/76 and her pulse is 95. Her respiration is 14 and oxygen saturation is 100%. Labs:   Admission on 03/29/2023   Component Date Value Ref Range Status    Acetaminophen Level 03/29/2023 <5 (L)  10 - 30 ug/mL Final    Ethanol 03/29/2023 <10  <10 mg/dL Final    Ethanol percent 03/29/2023 <5.569  % Final    Salicylate Lvl 08/32/4820 <1 (L)  3 - 10 mg/dL Final    Toxic Tricyclic Sc,Blood 05/77/9391 WRONG TEST ORDERED  NEGATIVE Final    WBC 03/29/2023 8.4  4.5 - 13.5 k/uL Final    RBC 03/29/2023 4.48  4.0 - 5.2 m/uL Final    Hemoglobin 03/29/2023 12.0  12.0 - 16.0 g/dL Final    Hematocrit 03/29/2023 36.0  36 - 46 % Final    MCV 03/29/2023 80.3  80 - 100 fL Final    MCH 03/29/2023 26.8  26 - 34 pg Final    MCHC 03/29/2023 33.4  31 - 37 g/dL Final    RDW 03/29/2023 15.4 (H)  11.5 - 14.9 % Final    Platelets 93/56/2208 296  150 - 450 k/uL Final    MPV 03/29/2023 7.4  6.0 - 12.0 fL Final    Glucose 03/29/2023 100 (H)  70 - 99 mg/dL Final    BUN 03/29/2023 21 (H)  6 - 20 mg/dL Final    Creatinine 03/29/2023 0.65  0.50 - 0.90 mg/dL Final    Est, Glom Filt Rate 03/29/2023 >60  >60 mL/min/1.73m2 Final    Comment:       These results are not intended for use in patients <25years of age. eGFR results are calculated without a race factor using the 2021 CKD-EPI equation. Careful clinical correlation is recommended, particularly when comparing to results   calculated using previous equations. The CKD-EPI equation is less accurate in patients with extremes of muscle mass, extra-renal   metabolism of creatine, excessive creatine ingestion, or following therapy that affects   renal tubular secretion.       Calcium 03/29/2023 10.9 (H)  8.6 - 10.4 mg/dL
stimuli. Cognition: Oriented to self, location, time, and situation  Memory: intact  Insight: fair   Judgement: poor       Data   height is 5' 5\" (1.651 m) and weight is 140 lb (63.5 kg). Her temporal temperature is 98 °F (36.7 °C). Her blood pressure is 124/77 and her pulse is 98. Her respiration is 14 and oxygen saturation is 100%. Labs:   Admission on 03/29/2023   Component Date Value Ref Range Status    Acetaminophen Level 03/29/2023 <5 (L)  10 - 30 ug/mL Final    Ethanol 03/29/2023 <10  <10 mg/dL Final    Ethanol percent 03/29/2023 <6.377  % Final    Salicylate Lvl 49/43/6919 <1 (L)  3 - 10 mg/dL Final    Toxic Tricyclic Sc,Blood 59/87/5173 WRONG TEST ORDERED  NEGATIVE Final    WBC 03/29/2023 8.4  4.5 - 13.5 k/uL Final    RBC 03/29/2023 4.48  4.0 - 5.2 m/uL Final    Hemoglobin 03/29/2023 12.0  12.0 - 16.0 g/dL Final    Hematocrit 03/29/2023 36.0  36 - 46 % Final    MCV 03/29/2023 80.3  80 - 100 fL Final    MCH 03/29/2023 26.8  26 - 34 pg Final    MCHC 03/29/2023 33.4  31 - 37 g/dL Final    RDW 03/29/2023 15.4 (H)  11.5 - 14.9 % Final    Platelets 21/00/5959 296  150 - 450 k/uL Final    MPV 03/29/2023 7.4  6.0 - 12.0 fL Final    Glucose 03/29/2023 100 (H)  70 - 99 mg/dL Final    BUN 03/29/2023 21 (H)  6 - 20 mg/dL Final    Creatinine 03/29/2023 0.65  0.50 - 0.90 mg/dL Final    Est, Glom Filt Rate 03/29/2023 >60  >60 mL/min/1.73m2 Final    Comment:       These results are not intended for use in patients <25years of age. eGFR results are calculated without a race factor using the 2021 CKD-EPI equation. Careful clinical correlation is recommended, particularly when comparing to results   calculated using previous equations. The CKD-EPI equation is less accurate in patients with extremes of muscle mass, extra-renal   metabolism of creatine, excessive creatine ingestion, or following therapy that affects   renal tubular secretion.       Calcium 03/29/2023 10.9 (H)  8.6 - 10.4 mg/dL Final    Sodium
Min:97.5 °F (36.4 °C), Max:98.3 °F (36.8 °C)    No results for input(s): POCGLU in the last 72 hours. No intake or output data in the 24 hours ending 03/31/23 1514    General Appearance:  alert, well appearing, and in no acute distress, thin built person  Mental status: oriented to person, place, and time with normal affect  Head:  normocephalic, atraumatic. Eye: no icterus, redness, pupils equal and reactive, extraocular eye movements intact, conjunctiva clear  Ear: normal external ear, no discharge, hearing intact  Nose:  no drainage noted  Mouth: mucous membranes moist  Neck: supple, no carotid bruits, thyroid not palpable  Lungs: Bilateral equal air entry, clear to ausculation, no wheezing, rales or rhonchi, normal effort  Cardiovascular: normal rate, regular rhythm, no murmur, gallop, rub.   Abdomen: Soft, nontender, nondistended, normal bowel sounds, no hepatomegaly or splenomegaly  Neurologic: There are no new focal motor or sensory deficits, normal muscle tone and bulk, no abnormal sensation, normal speech, cranial nerves II through XII grossly intact  Skin: No gross lesions, rashes, bruising or bleeding on exposed skin area  Extremities:  peripheral pulses palpable, no pedal edema or calf pain with palpation  Psych: Depressed    Investigations:      Laboratory Testing:  Recent Results (from the past 24 hour(s))   Basic Metabolic Panel    Collection Time: 03/31/23  2:29 PM   Result Value Ref Range    Glucose 94 70 - 99 mg/dL    BUN 12 6 - 20 mg/dL    Creatinine 0.56 0.50 - 0.90 mg/dL    Est, Glom Filt Rate >60 >60 mL/min/1.73m2    Calcium 11.5 (H) 8.6 - 10.4 mg/dL    Sodium 138 135 - 144 mmol/L    Potassium 4.2 3.7 - 5.3 mmol/L    Chloride 100 98 - 107 mmol/L    CO2 29 20 - 31 mmol/L    Anion Gap 9 9 - 17 mmol/L           Consultations:   IP CONSULT TO INTERNAL MEDICINE  IP CONSULT TO SOCIAL WORK  IP CONSULT TO NEUROLOGY  Assessment :      Primary Problem  Major depressive disorder, recurrent, severe with
Comment:       (Positive cutoff 50 ng/mL)                  Oxycodone Screen, Ur 03/29/2023 NEGATIVE  NEGATIVE Final    Comment:       (Positive cutoff 100 ng/mL)                  Fentanyl, Ur 03/29/2023 NEGATIVE  NEGATIVE Final    Comment:       (Positive cutoff  5 ng/ml)            Test Information 03/29/2023 Assay provides medical screening only. The absence of expected drug(s) and/or metabolite(s) may indicate diluted or adulterated urine, limitations of testing or timing of collection. Final    Comment: Testing for legal purposes should be confirmed by another method. To request confirmation   of test result, please call the lab within 7 days of sample submission. TSH 03/29/2023 2.54  0.30 - 5.00 uIU/mL Final    Tricyclic Antidep,Urine 35/99/4190 NEGATIVE  NEGATIVE Final    Comment:       (Positive cutoff 1000 ng/mL)  Assay provides rapid clinical screening only. Presumptive positive results for legal   purposes should be confirmed by another method. To request confirmation, please call the   lab within 7 days of sample submission. Glucose 03/31/2023 94  70 - 99 mg/dL Final    BUN 03/31/2023 12  6 - 20 mg/dL Final    Creatinine 03/31/2023 0.56  0.50 - 0.90 mg/dL Final    Est, Glom Filt Rate 03/31/2023 >60  >60 mL/min/1.73m2 Final    Comment:       These results are not intended for use in patients <25years of age. eGFR results are calculated without a race factor using the 2021 CKD-EPI equation. Careful clinical correlation is recommended, particularly when comparing to results   calculated using previous equations. The CKD-EPI equation is less accurate in patients with extremes of muscle mass, extra-renal   metabolism of creatine, excessive creatine ingestion, or following therapy that affects   renal tubular secretion.       Calcium 03/31/2023 11.5 (A)  8.6 - 10.4 mg/dL Final    Sodium 03/31/2023 138  135 - 144 mmol/L Final    Potassium 03/31/2023 4.2  3.7 - 5.3 mmol/L Final
hypothyroidism, not taking Synthroid for long period of time, TSH tested recently is okay, will monitor off Synthroid, patient need to have outpatient TSH done  History of PE, documented on CTA done in July 2022, she told me she has 1 episode of PE, unprovoked,(being on birth control pill is a week provoking factor to develop PE) , she is taking Eliquis religiously, has not developed any side effect, does not know whether thrombophilia work-up was done abnormal.  At this point in time, we will continue with Eliquis since she is tolerating this medication without any side effect, she is very young, does not have strong provocative factor to develop PE, in my opinion she should follow-up with hematologist as outpatient, should have thrombophilia work-up before discontinuation of Eliquis. Discussed with the patient she was understanding  Reviewed patient lab work, slightly elevated calcium, will monitor BMP tomorrow    3/31  BMP reviewed, patient's calcium is elevated and trending up  We will check PTH PTH RP and ionized calcium, adding on to a.m. labs  Advised patient to stay away from calcium supplements  Encourage good hydration    4/1  Calcium has been trending up, ionized calcium 1.42, patient did admit to high intake of calcium rich food material, PTH normal well, PTH related peptide is currently pending, patient does have history of PE as well, on Eliquis? ? Will check vitamin D as well, encouraged p.o. hydration, will repeat calcium,  monitor calcium, does have history of chronic hypercalcemia,  Monitor calcium again tomorrow. Patient to avoid calcium rich supplements. Currently asymptomatic,  Recommended to have further outpatient evaluation.       4/2  Patient continues to be asymptomatic, will check vitamin D and BMP  Patient advised to avoid calcium supplements, PTH is normal, PTH related peptide pending, patient was told that she will need outpatient evaluation for hypercalcemia, voiced
IntraMUSCular, Q6H PRN  haloperidol (HALDOL) tablet 5 mg, 5 mg, Oral, Q6H PRN    ASSESSMENT  Major depressive disorder, recurrent, severe with psychotic features ([facility] [address]         PLAN  Patient symptoms are: unchanged, unstable  Medications Changed Today. A discussion of risks, benefits, and alternatives was held with the patient and this provider with regards to medication changes. After this discussion we mutually agreed to proceed with the medication changes. Increase Abilify to 10 mg daily  Monitor need and frequency of PRN medications. Encourage participation in groups and milieu. Attempt to develop insight. Psycho-education conducted. Probable discharge is undetermined at this time  Follow-up daily while inpatient. Patient continues to be monitored in the inpatient psychiatric facility at [facility] for safety and stabilization. Patient continues to need, on a daily basis, active treatment furnished directly by or requiring the supervision of inpatient psychiatric personnel. Electronically signed by CRYSTAL Gonzalez CNP on 3/31/2023 at 1:08 PM    **This report has been created using voice recognition software. It may contain minor errors which are inherent in voice recognition technology. **
continues to be monitored in the inpatient psychiatric facility at [facility] for safety and stabilization. Patient continues to need, on a daily basis, active treatment furnished directly by or requiring the supervision of inpatient psychiatric personnel. Electronically signed by CRYSTAL Zhu CNP on 4/3/2023 at 11:09 PM    **This report has been created using voice recognition software. It may contain minor errors which are inherent in voice recognition technology. **

## 2023-04-06 NOTE — PLAN OF CARE
Please have patient or POA answer MRI screening form questions in Epic. Answers can not be taken from Epic chart. Exam will be scheduled after form has been completed and reviewed. Note in Epic by nurse Jackeline Graves, states that MRI without contrast is needed, but exam was ordered as with and without. This may need to be corrected.
Problem: Depression  Goal: Will be euthymic at discharge  Description: INTERVENTIONS:  1. Administer medication as ordered  2. Provide emotional support via 1:1 interaction with staff  3. Encourage involvement in milieu/groups/activities  4. Monitor for social isolation  Outcome: Not Progressing  Note: Patient verbalizes depression. Patient medication compliant and behavioral compliant. Problem: Anxiety  Goal: Will report anxiety at manageable levels  Description: INTERVENTIONS:  1. Administer medication as ordered  2. Teach and rehearse alternative coping skills  3. Provide emotional support with 1:1 interaction with staff  Outcome: Not Progressing  Flowsheets (Taken 3/30/2023 0018)  Will report anxiety at manageable levels:   Teach and rehearse alternative coping skills   Administer medication as ordered   Provide emotional support with 1:1 interaction with staff  Note: Patient remains anxious throughout shift. Patient is hyper verbal, obsessive and  hyperactive. Patient is medication compliant. Patient remains forgetful. Staff continues to provide emotional support. Patient attended groups. Staff continually rehearsed alternative coping skills.
Problem: Self Harm/Suicidality  Goal: Will have no self-injury during hospital stay  Description: INTERVENTIONS:  1. Ensure constant observer at bedside with Q15M safety checks  2. Maintain a safe environment  3. Secure patient belongings  4. Ensure family/visitors adhere to safety recommendations  5. Ensure safety tray has been added to patient's diet order  6. Every shift and PRN: Re-assess suicidal risk via Frequent Screener       4/4/2023 2224 by Shine Small LPN  Outcome: Progressing  Patient denies suicidal ideations this shift and agrees to seek staff out if negative thoughts arise. Will continue to monitor every 15 minutes and intermittently. Problem: Depression  Goal: Will be euthymic at discharge  Description: INTERVENTIONS:  1. Administer medication as ordered  2. Provide emotional support via 1:1 interaction with staff  3. Encourage involvement in milieu/groups/activities  4. Monitor for social isolation  4/4/2023 2224 by Shine Small LPN  Outcome: Progressing    Patient is social and appears brightened this shift. Patient reports an improvement in her mood. Patient is medication compliant and behavior is controlled at this time.
Problem: Self Harm/Suicidality  Goal: Will have no self-injury during hospital stay  Description: INTERVENTIONS:  1. Ensure constant observer at bedside with Q15M safety checks  2. Maintain a safe environment  3. Secure patient belongings  4. Ensure family/visitors adhere to safety recommendations  5. Ensure safety tray has been added to patient's diet order  6. Every shift and PRN: Re-assess suicidal risk via Frequent Screener       Outcome: Progressing  Patient denies suicidal thoughts or homicidal ideations  this shift. Will continue to monitor Q 15 minutes and intermittently. Problem: Depression  Goal: Will be euthymic at discharge  Description: INTERVENTIONS:  1. Administer medication as ordered  2. Provide emotional support via 1:1 interaction with staff  3. Encourage involvement in milieu/groups/activities  4. Monitor for social isolation      Outcome: Progressing  Patient is social and appears brightened this shift. Patient reports an improvement in her mood. Patient is medication compliant and behavior is controlled at this time. Problem: Anxiety  Goal: Will report anxiety at manageable levels  Description: INTERVENTIONS:  1. Administer medication as ordered  2. Teach and rehearse alternative coping skills  3. Provide emotional support with 1:1 interaction with staff  Outcome: Progressing  Patient encouraged to explore coping skills for reducing anxiety. None verbalized at this time.
Problem: Self Harm/Suicidality  Goal: Will have no self-injury during hospital stay  Description: INTERVENTIONS:  1. Ensure constant observer at bedside with Q15M safety checks  2. Maintain a safe environment  3. Secure patient belongings  4. Ensure family/visitors adhere to safety recommendations  5. Ensure safety tray has been added to patient's diet order  6. Every shift and PRN: Re-assess suicidal risk via Frequent Screener    3/31/2023 1215 by Jun Koo RN  Outcome: Progressing     Problem: Depression  Goal: Will be euthymic at discharge  Description: INTERVENTIONS:  1. Administer medication as ordered  2. Provide emotional support via 1:1 interaction with staff  3. Encourage involvement in milieu/groups/activities  4. Monitor for social isolation  3/31/2023 1215 by Jun Koo RN  Outcome: Progressing     Problem: Anxiety  Goal: Will report anxiety at manageable levels  Description: INTERVENTIONS:  1. Administer medication as ordered  2. Teach and rehearse alternative coping skills  3. Provide emotional support with 1:1 interaction with staff  3/31/2023 1215 by Jun Koo RN  Outcome: Progressing     Patient reports fleeting suicidal ideations. Contracts for safety. Safe environment maintained. Safety checks every 15 minutes continued per unit policy. Patient states she tries to stay away from razor and sharp items. Patient states she is depressed, rated her depression a 7/10. Patient is observed to be social with others in dayroom, and plays game. Patient reports that she is anxious, because she fears that her family will forget about her her at the hospital. Writer encouraged patient to speak with family. Patient reports her medications are \"somewhat effective\". Patient has been attending groups.
Problem: Self Harm/Suicidality  Goal: Will have no self-injury during hospital stay  Description: INTERVENTIONS:  1. Ensure constant observer at bedside with Q15M safety checks  2. Maintain a safe environment  3. Secure patient belongings  4. Ensure family/visitors adhere to safety recommendations  5. Ensure safety tray has been added to patient's diet order  6. Every shift and PRN: Re-assess suicidal risk via Frequent Screener    4/1/2023 1018 by Blair Kidd RN  Outcome: Progressing  Note: Patient denies suicidal ideations and thoughts to harm self or others. Patient monitored every 15 minutes during rounds. Problem: Depression  Goal: Will be euthymic at discharge  Description: INTERVENTIONS:  1. Administer medication as ordered  2. Provide emotional support via 1:1 interaction with staff  3. Encourage involvement in milieu/groups/activities  4. Monitor for social isolation  4/1/2023 1018 by Blair Kidd RN  Outcome: Progressing  Note: Patient reports that her depression is at about a 6/10 today. She reports that this is better than yesterday. Patient social in dayroom with select peers. Problem: Anxiety  Goal: Will report anxiety at manageable levels  Description: INTERVENTIONS:  1. Administer medication as ordered  2. Teach and rehearse alternative coping skills  3.  Provide emotional support with 1:1 interaction with staff  4/1/2023 1018 by Blair Kidd RN  Outcome: Progressing
Problem: Self Harm/Suicidality  Goal: Will have no self-injury during hospital stay  Description: INTERVENTIONS:  1. Ensure constant observer at bedside with Q15M safety checks  2. Maintain a safe environment  3. Secure patient belongings  4. Ensure family/visitors adhere to safety recommendations  5. Ensure safety tray has been added to patient's diet order  6. Every shift and PRN: Re-assess suicidal risk via Frequent Screener    4/3/2023 1233 by Alejandro Larkin RN  Outcome: Progressing     Problem: Depression  Goal: Will be euthymic at discharge  Description: INTERVENTIONS:  1. Administer medication as ordered  2. Provide emotional support via 1:1 interaction with staff  3. Encourage involvement in milieu/groups/activities  4. Monitor for social isolation  4/3/2023 1233 by Alejandro Larkin RN  Outcome: Progressing     Problem: Anxiety  Goal: Will report anxiety at manageable levels  Description: INTERVENTIONS:  1. Administer medication as ordered  2. Teach and rehearse alternative coping skills  3. Provide emotional support with 1:1 interaction with staff  4/3/2023 1233 by Alejandro Larkin RN  Outcome: Progressing     Problem: Pain  Goal: Verbalizes/displays adequate comfort level or baseline comfort level  4/3/2023 1233 by Alejandro Larkin RN  Outcome: Progressing     Patient reports fleeting suicidal ideations with no specific plan. Patient contracts for safety. Safe environment maintained. Safety checks every 15 minutes continued per unit policy. Patient rated her depression a 5/10. Patient rated her anxiety a 5/1. Patient states she is going to use her coping skills to lower anxiety. Patient reports pain during assessment, patient states her pain is in her stomach due to her being lactulose intolerant. Patient social In dayroom with others.
Problem: Self Harm/Suicidality  Goal: Will have no self-injury during hospital stay  Description: INTERVENTIONS:  1. Ensure constant observer at bedside with Q15M safety checks  2. Maintain a safe environment  3. Secure patient belongings  4. Ensure family/visitors adhere to safety recommendations  5. Ensure safety tray has been added to patient's diet order  6. Every shift and PRN: Re-assess suicidal risk via Frequent Screener    4/3/2023 2044 by Liliana Moreland LPN  Note: Patient admits to [facility] and was offered PRN atarax and educated on coming to staff for help if need be. Patient agrees to seek out nursing staff if anything changes. Will continue to monitor. Q15min safety checks. Problem: Anxiety  Goal: Will report anxiety at manageable levels  Description: INTERVENTIONS:  1. Administer medication as ordered  2. Teach and rehearse alternative coping skills  3. Provide emotional support with 1:1 interaction with staff  4/3/2023 2044 by Liliana Moreland LPN  Note: Patient admits to Anxiety and Depression at a 3/10. Patient was offered and accepted PRN atarax. Patient states she is feeling better and feels safe in facility. Will continue to monitor. Q15min safety checks.
Problem: Self Harm/Suicidality  Goal: Will have no self-injury during hospital stay  Description: INTERVENTIONS:  1. Ensure constant observer at bedside with Q15M safety checks  2. Maintain a safe environment  3. Secure patient belongings  4. Ensure family/visitors adhere to safety recommendations  5. Ensure safety tray has been added to patient's diet order  6. Every shift and PRN: Re-assess suicidal risk via Frequent Screener    4/5/2023 0848 by Archie Vila LPN  Outcome: Progressing  4/4/2023 2224 by Collin Phillips LPN  Outcome: Progressing     Problem: Depression  Goal: Will be euthymic at discharge  Description: INTERVENTIONS:  1. Administer medication as ordered  2. Provide emotional support via 1:1 interaction with staff  3. Encourage involvement in milieu/groups/activities  4. Monitor for social isolation  4/5/2023 0848 by Archie Vila LPN  Outcome: Progressing  4/4/2023 2224 by Collin Phillips LPN  Outcome: Progressing     Problem: Anxiety  Goal: Will report anxiety at manageable levels  Description: INTERVENTIONS:  1. Administer medication as ordered  2. Teach and rehearse alternative coping skills  3. Provide emotional support with 1:1 interaction with staff  Outcome: Progressing     Patient denies suicidal / homicidal ideation, anxiety , depression and hallucinations. Patient is out and social with peers, attending groups, hyper verbal, hyper alert , restless , medication compliant and behavioral control. Patient is impulsive at times and constantly at nurses station accepts redirection but needs it constantly .
Problem: Self Harm/Suicidality  Goal: Will have no self-injury during hospital stay  Description: INTERVENTIONS:  1. Ensure constant observer at bedside with Q15M safety checks  2. Maintain a safe environment  3. Secure patient belongings  4. Ensure family/visitors adhere to safety recommendations  5. Ensure safety tray has been added to patient's diet order  6. Every shift and PRN: Re-assess suicidal risk via Frequent Screener    4/6/2023 1045 by Ursula Rojas LPN  Patient denies any thoughts to harm selfOutcome: Progressing     Problem: Depression  Goal: Will be euthymic at discharge  Description: INTERVENTIONS:  1. Administer medication as ordered  2. Provide emotional support via 1:1 interaction with staff  3. Encourage involvement in milieu/groups/activities  4. Monitor for social isolation  4/6/2023 1045 by Ursula Rojas LPN  Outcome: Progressing   Patient denies any depression   Problem: Anxiety  Goal: Will report anxiety at manageable levels  Description: INTERVENTIONS:  1. Administer medication as ordered  2. Teach and rehearse alternative coping skills  3.  Provide emotional support with 1:1 interaction with staff  4/6/2023 1045 by Ursula Rojas LPN  Outcome: Progressing   Miss Janice Ornelas is seen in the dayroom affect is brightened, she is taking medications and denies any thoughts to harm self and feels ready for discharge
Problem: Self Harm/Suicidality  Goal: Will have no self-injury during hospital stay  Description: INTERVENTIONS:  1. Ensure constant observer at bedside with Q15M safety checks  2. Maintain a safe environment  3. Secure patient belongings  4. Ensure family/visitors adhere to safety recommendations  5. Ensure safety tray has been added to patient's diet order  6. Every shift and PRN: Re-assess suicidal risk via Frequent Screener    Outcome: Progressing     Problem: Depression  Goal: Will be euthymic at discharge  Description: INTERVENTIONS:  1. Administer medication as ordered  2. Provide emotional support via 1:1 interaction with staff  3. Encourage involvement in milieu/groups/activities  4. Monitor for social isolation  Outcome: Progressing     Problem: Anxiety  Goal: Will report anxiety at manageable levels  Description: INTERVENTIONS:  1. Administer medication as ordered  2. Teach and rehearse alternative coping skills  3. Provide emotional support with 1:1 interaction with staff  Outcome: Progressing     Patient explains she is experiencing anxiety, depression , suicidal ideation but contracts for safety, and denies hallucinations but stays \" I haven't seen or heard anything yet but I usually do. \"  Patient is restless, impulsive, hyper verbal, obsessive, medication compliant, and unable to concentrate at times.
Problem: Self Harm/Suicidality  Goal: Will have no self-injury during hospital stay  Description: INTERVENTIONS:  1. Ensure constant observer at bedside with Q15M safety checks  2. Maintain a safe environment  3. Secure patient belongings  4. Ensure family/visitors adhere to safety recommendations  5. Ensure safety tray has been added to patient's diet order  6. Every shift and PRN: Re-assess suicidal risk via Frequent Screener  Patient denies any thoughts to harm self  4/2/2023 1040 by Wil Bledsoe LPN  Outcome: Progressing   Patient denies thoughts of self harm   Problem: Depression  Goal: Will be euthymic at discharge  Description: INTERVENTIONS:  1. Administer medication as ordered  2. Provide emotional support via 1:1 interaction with staff  3. Encourage involvement in milieu/groups/activities  4. Monitor for social isolation  4/2/2023 1040 by Wil Bledsoe LPN  Outcome: Progressing   Miss Jessica Beckford is seen in the dayroom, affect is brightened and she denies any thoughts to harm self, Reports depression and anxiety feels her mood is improved. Attends groups, social with peers, Fair sleep and no issues with appetite. Multiple requests at nurses station. Problem: Anxiety  Goal: Will report anxiety at manageable levels  Description: INTERVENTIONS:  1. Administer medication as ordered  2. Teach and rehearse alternative coping skills  3.  Provide emotional support with 1:1 interaction with staff  4/2/2023 1040 by Wil Bledsoe LPN  Outcome: Progressing
Problem: Self Harm/Suicidality  Goal: Will have no self-injury during hospital stay  Outcome: Progressing   Maintain a safe environment   Secure patient belongings   Problem: Depression  Goal: Will be euthymic at discharge  Outcome: Progressing  Problem: Anxiety  Goal: Will report anxiety at manageable levels  Outcome: Progressing  Outcome: Not Progressing    Note: Patient remains anxious throughout shift. Patient is hyper verbal, obsessive and  hyperactive. Patient is medication compliant. Patient remains forgetful. Staff continues to provide emotional support. Patient attended groups. Staff continually rehearsed alternative coping skills.
Spoke with radiologist who agreed that patient can not currently be considered safe for MRI due to inconsistencies with her screening form and ability to competently answer questions.
4/2/2023 2313)  Will report anxiety at manageable levels:   Administer medication as ordered   Provide emotional support with 1:1 interaction with staff   Teach and rehearse alternative coping skills  Note: Pt brightened upon approach, euthymic at this time. Pt interactive during shift assessment, states depression level is much improved, currently rates it at 5/1-10. Problem: Pain  Goal: Verbalizes/displays adequate comfort level or baseline comfort level  Outcome: Progressing  Flowsheets (Taken 4/2/2023 2313)  Verbalizes/displays adequate comfort level or baseline comfort level:   Encourage patient to monitor pain and request assistance   Assess pain using appropriate pain scale   Implement non-pharmacological measures as appropriate and evaluate response  Note: Pt denies any pain or discomfort during shift assessment.
Patient monitored every 15 minutes with environmental safety checks.
Progressing  Flowsheets (Taken 4/1/2023 2143)  Will report anxiety at manageable levels:   Provide emotional support with 1:1 interaction with staff   Teach and rehearse alternative coping skills  Note: Pt reports utilization of coping skills, shows insights about the appropriate time when to ask for PRN anti-anxiety medication to avoid panic attacks. Problem: Pain  Goal: Verbalizes/displays adequate comfort level or baseline comfort level  Outcome: Progressing  Flowsheets (Taken 4/1/2023 2143)  Verbalizes/displays adequate comfort level or baseline comfort level:   Encourage patient to monitor pain and request assistance   Assess pain using appropriate pain scale   Administer analgesics based on type and severity of pain and evaluate response   Implement non-pharmacological measures as appropriate and evaluate response   Consider cultural and social influences on pain and pain management  Note: Pt complains of generalized pain rates at 10/1-10. Staff educated pt of non pharmacological pain intervention such as distraction, relaxation, and rest. Pt requested PRN analgesic, states pain medication helps decrease pain level.

## 2023-04-07 LAB — PTH RELATED PEPTIDE: <2 PMOL/L (ref 0–3.4)

## 2023-04-14 ENCOUNTER — HOSPITAL ENCOUNTER (EMERGENCY)
Age: 23
Discharge: HOME | End: 2023-04-14
Attending: EMERGENCY MEDICINE
Payer: COMMERCIAL

## 2023-04-14 ENCOUNTER — APPOINTMENT (OUTPATIENT)
Dept: GENERAL RADIOLOGY | Age: 23
End: 2023-04-14
Payer: COMMERCIAL

## 2023-04-14 VITALS
DIASTOLIC BLOOD PRESSURE: 73 MMHG | TEMPERATURE: 97.8 F | SYSTOLIC BLOOD PRESSURE: 119 MMHG | HEART RATE: 86 BPM | RESPIRATION RATE: 16 BRPM

## 2023-04-14 DIAGNOSIS — M25.572 ACUTE LEFT ANKLE PAIN: Primary | ICD-10-CM

## 2023-04-14 PROCEDURE — 73610 X-RAY EXAM OF ANKLE: CPT

## 2023-04-14 PROCEDURE — 6370000000 HC RX 637 (ALT 250 FOR IP): Performed by: STUDENT IN AN ORGANIZED HEALTH CARE EDUCATION/TRAINING PROGRAM

## 2023-04-14 PROCEDURE — 99283 EMERGENCY DEPT VISIT LOW MDM: CPT

## 2023-04-14 RX ORDER — IBUPROFEN 800 MG/1
800 TABLET ORAL ONCE
Status: COMPLETED | OUTPATIENT
Start: 2023-04-14 | End: 2023-04-14

## 2023-04-14 RX ADMIN — IBUPROFEN 800 MG: 800 TABLET, FILM COATED ORAL at 21:09

## 2023-04-14 ASSESSMENT — ENCOUNTER SYMPTOMS
ABDOMINAL PAIN: 0
VOMITING: 0
SORE THROAT: 0
BACK PAIN: 0
SHORTNESS OF BREATH: 0
COUGH: 0

## 2023-04-14 ASSESSMENT — PAIN SCALES - GENERAL
PAINLEVEL_OUTOF10: 10
PAINLEVEL_OUTOF10: 10

## 2023-04-14 ASSESSMENT — PAIN DESCRIPTION - ONSET: ONSET: SUDDEN

## 2023-04-14 ASSESSMENT — PAIN DESCRIPTION - FREQUENCY: FREQUENCY: CONTINUOUS

## 2023-04-14 ASSESSMENT — PAIN - FUNCTIONAL ASSESSMENT: PAIN_FUNCTIONAL_ASSESSMENT: 0-10

## 2023-04-14 ASSESSMENT — PAIN DESCRIPTION - PAIN TYPE: TYPE: ACUTE PAIN

## 2023-04-15 NOTE — ED PROVIDER NOTES
[facility]     Emergency Department     Faculty Attestation    I performed a history and physical examination of the patient and discussed management with the resident. I reviewed the residents note and agree with the documented findings and plan of care. Any areas of disagreement are noted on the chart. I was personally present for the key portions of any procedures. I have documented in the chart those procedures where I was not present during the key portions. I have reviewed the emergency nurses triage note. I agree with the chief complaint, past medical history, past surgical history, allergies, medications, social and family history as documented unless otherwise noted below. For Physician Assistant/ Nurse Practitioner cases/documentation I have personally evaluated this patient and have completed at least one if not all key elements of the E/M (history, physical exam, and MDM). Additional findings are as noted. I have personally seen and evaluated the patient. I find the patient's history and physical exam are consistent with the NP/PA documentation. I agree with the care provided, treatment rendered, disposition and follow-up plan. Ankle while walking history of ankle injury in the past x-rays suggest avulsion fracture which appears to be chronic in nature will refer to podiatry for recurrent ankle injury      Critical Care     Haily Reno M.D.   Attending Emergency  Physician            Anand Haas MD  04/14/23 [id number]

## 2023-04-15 NOTE — ED PROVIDER NOTES
[facility] ED  Emergency Department Encounter  Emergency Medicine Resident     Pt Oneil Borrego  MRN: [id number]  [place] 2000  Date of evaluation: 4/14/23  PCP:  No primary care provider on file. Note Started: 8:49 PM EDT    CHIEF COMPLAINT       Chief Complaint   Patient presents with    Ankle Pain     Twisted ankle while walking      HISTORY OF PRESENT ILLNESS  (Location/Symptom, Timing/Onset, Context/Setting, Quality, Duration, Modifying Factors, Severity.)      Leandro Artis is a 23 y.o. female who presents with left ankle pain after inverting it while walking on the sidewalk. Did not fall to ground, no fall no LOC. Patient has been able to ambulate on her ankle, however states that she has had to limp. No other concerns at this time. States that she had previously rolled it in is concerned that it has not fully healed. PAST MEDICAL / SURGICAL / SOCIAL / FAMILY HISTORY      has a past medical history of Anxiety, Bipolar 1 disorder ([facility] [address] Heart murmur, PTSD (post-traumatic stress disorder), and Thyroid disease. has no past surgical history on file.     Social History     Socioeconomic History    Marital status: Single     Spouse name: Not on file    Number of children: Not on file    Years of education: Not on file    Highest education level: Not on file   Occupational History    Not on file   Tobacco Use    Smoking status: Never    Smokeless tobacco: Never   Substance and Sexual Activity    Alcohol use: Not Currently    Drug use: Not Currently    Sexual activity: Not on file   Other Topics Concern    Not on file   Social History Narrative    Not on file     Social Determinants of Health     Financial Resource Strain: Not on file   Food Insecurity: Not on file   Transportation Needs: Not on file   Physical Activity: Not on file   Stress: Not on file   Social Connections: Not on file   Intimate Partner Violence: Not on file   Housing Stability: Not on file     No family

## 2023-04-15 NOTE — DISCHARGE INSTRUCTIONS
Call today or tomorrow to follow up with [facility] in 5-7 days. Use an ice pack or bag filled with ice and apply to the injured area 3 - 4 times a day for 15 - 20 minutes each time. Use ibuprofen or Tylenol (unless prescribed medications that have Tylenol in it) for pain. You can take over the counter Ibuprofen (advil) tablets (4 every 8 hours or 3 every 6 hours or 2 every 4 hours)    Use your crutches for the next several days until you are able to take 10 steps without pain. Return to the Emergency Department for worsening of pain, increase swelling to the ankle, inability to move your toes, any other care or concern.

## 2023-04-19 ENCOUNTER — HOSPITAL ENCOUNTER (EMERGENCY)
Age: 23
Discharge: HOME | End: 2023-04-19
Attending: EMERGENCY MEDICINE
Payer: COMMERCIAL

## 2023-04-19 VITALS
OXYGEN SATURATION: 100 % | TEMPERATURE: 97.1 F | DIASTOLIC BLOOD PRESSURE: 80 MMHG | HEART RATE: 95 BPM | RESPIRATION RATE: 14 BRPM | SYSTOLIC BLOOD PRESSURE: 119 MMHG

## 2023-04-19 DIAGNOSIS — Z76.0 ENCOUNTER FOR MEDICATION REFILL: Primary | ICD-10-CM

## 2023-04-19 PROCEDURE — 99283 EMERGENCY DEPT VISIT LOW MDM: CPT

## 2023-04-19 RX ORDER — OMEPRAZOLE 20 MG/1
20 CAPSULE, DELAYED RELEASE ORAL
Qty: 180 CAPSULE | Refills: 1 | Status: SHIPPED | OUTPATIENT
Start: 2023-04-19

## 2023-04-19 RX ORDER — LANOLIN ALCOHOL/MO/W.PET/CERES
3 CREAM (GRAM) TOPICAL DAILY
Qty: 30 TABLET | Refills: 1 | Status: SHIPPED | OUTPATIENT
Start: 2023-04-19

## 2023-04-19 ASSESSMENT — ENCOUNTER SYMPTOMS
BACK PAIN: 0
ABDOMINAL PAIN: 0
NAUSEA: 0
DIARRHEA: 0
CHEST TIGHTNESS: 0
SHORTNESS OF BREATH: 0
CONSTIPATION: 0
VOMITING: 0
COUGH: 0

## 2023-04-19 NOTE — ED PROVIDER NOTES
[facility]     Emergency Department     Faculty Attestation    I performed a history and physical examination of the patient and discussed management with the resident. I reviewed the residents note and agree with the documented findings and plan of care. Any areas of disagreement are noted on the chart. I was personally present for the key portions of any procedures. I have documented in the chart those procedures where I was not present during the key portions. I have reviewed the emergency nurses triage note. I agree with the chief complaint, past medical history, past surgical history, allergies, medications, social and family history as documented unless otherwise noted below. Documentation of the HPI, Physical Exam and Medical Decision Making performed by medical students or scribes is based on my personal performance of the HPI, PE and MDM. For Physician Assistant/ Nurse Practitioner cases/documentation I have personally evaluated this patient and have completed at least one if not all key elements of the E/M (history, physical exam, and MDM). Additional findings are as noted. Vital signs:   Vitals:    04/19/23 1413   BP: (!) 143/87   Pulse: (!) 110   Resp: 18   Temp: 97.1 °F (36.2 °C)   SpO2: 99%      Patient here requesting refill of her Melatonin. No other complaint.              Danyelle Michaud M.D,  Attending Emergency  Physician            Jm Middleton MD  04/19/23 4627
Connections: Not on file   Intimate Partner Violence: Not on file   Housing Stability: Not on file       History reviewed. No pertinent family history. Allergies:  Penicillins and Vyvanse [lisdexamfetamine]    Home Medications:  Prior to Admission medications    Medication Sig Start Date End Date Taking? Authorizing Provider   melatonin (RA MELATONIN) 3 MG TABS tablet Take 1 tablet by mouth daily 4/19/23  Yes Navi Hein MD   omeprazole (PRILOSEC) 20 MG delayed release capsule Take 1 capsule by mouth 2 times daily (before meals) 4/19/23  Yes Navi Hein MD   apixaban (ELIQUIS) 5 MG TABS tablet Take 1 tablet by mouth 2 times daily 4/5/23   [facility], APRN - CNP   lamoTRIgine (LAMICTAL) 25 MG tablet Take 1 tablet by mouth daily 4/6/23   [facility], APRN - CNP   ARIPiprazole (ABILIFY) 15 MG tablet Take 1 tablet by mouth at bedtime 4/5/23   [facility], APRN - [facility]       REVIEW OF SYSTEMS    (2-9 systems for level 4, 10 or more for level 5)      Review of Systems   Constitutional:  Negative for appetite change, fatigue and fever. Respiratory:  Negative for cough, chest tightness and shortness of breath. Cardiovascular:  Negative for chest pain. Gastrointestinal:  Negative for abdominal pain, constipation, diarrhea, nausea and vomiting. Genitourinary:  Negative for dysuria and urgency. Musculoskeletal:  Negative for back pain. Skin:  Negative for wound. Neurological:  Negative for weakness, light-headedness and headaches. Psychiatric/Behavioral:  Negative for agitation, self-injury and suicidal ideas. The patient is not nervous/anxious. PHYSICAL EXAM   (up to 7 for level 4, 8 or more for level 5)     INITIAL VITALS:    temperature is 97.1 °F (36.2 °C). Her blood pressure is 119/80 and her pulse is 95. Her respiration is 14 and oxygen saturation is 100%. Physical Exam  Vitals and nursing note reviewed. Constitutional:       General: She is awake.  She is not in acute

## 2023-04-19 NOTE — ED NOTES
Pt to ED requesting refill on melatonin. No other complaints at this time. Pt alert and oriented x4, talking in complete sentences, respirations even and unlabored. Pt acting age appropriate.  White board updated, will continue to plan of care       Shira Webb, RN  04/19/23 5647

## 2023-04-19 NOTE — DISCHARGE INSTRUCTIONS
Please take all of your prescribed medications advised  Please follow-up with your scheduled appointments  If you are having any chest pain shortness of breath please come to ED.   If you are having any depressive thoughts or self-harm please come to ED

## 2023-04-28 ENCOUNTER — HOSPITAL ENCOUNTER (EMERGENCY)
Age: 23
Discharge: HOME | End: 2023-04-28
Attending: EMERGENCY MEDICINE
Payer: COMMERCIAL

## 2023-04-28 VITALS
WEIGHT: 130 LBS | BODY MASS INDEX: 20.89 KG/M2 | SYSTOLIC BLOOD PRESSURE: 125 MMHG | OXYGEN SATURATION: 98 % | HEIGHT: 66 IN | RESPIRATION RATE: 18 BRPM | HEART RATE: 108 BPM | DIASTOLIC BLOOD PRESSURE: 78 MMHG | TEMPERATURE: 98 F

## 2023-04-28 DIAGNOSIS — F41.9 ANXIETY: Primary | ICD-10-CM

## 2023-04-28 LAB
ABSOLUTE EOS #: 0.06 K/UL (ref 0–0.44)
ABSOLUTE IMMATURE GRANULOCYTE: <0.03 K/UL (ref 0–0.3)
ABSOLUTE LYMPH #: 3 K/UL (ref 1.2–5.2)
ABSOLUTE MONO #: 0.65 K/UL (ref 0.1–1.4)
ANION GAP SERPL CALCULATED.3IONS-SCNC: 9 MMOL/L (ref 9–17)
BASOPHILS # BLD: 0 % (ref 0–2)
BASOPHILS ABSOLUTE: <0.03 K/UL (ref 0–0.2)
BUN SERPL-MCNC: 16 MG/DL (ref 6–20)
CALCIUM SERPL-MCNC: 11.1 MG/DL (ref 8.6–10.4)
CHLORIDE SERPL-SCNC: 102 MMOL/L (ref 98–107)
CO2 SERPL-SCNC: 27 MMOL/L (ref 20–31)
CREAT SERPL-MCNC: 0.56 MG/DL (ref 0.5–0.9)
D DIMER BLD IA.RAPID-MCNC: <0.27 UG/ML FEU (ref 0–0.57)
EOSINOPHILS RELATIVE PERCENT: 1 % (ref 1–4)
GFR SERPL CREATININE-BSD FRML MDRD: >60 ML/MIN/1.73M2
GLUCOSE SERPL-MCNC: 107 MG/DL (ref 70–99)
HCG QUALITATIVE: NEGATIVE
HCT VFR BLD AUTO: 36.8 % (ref 36.3–47.1)
HGB BLD-MCNC: 11.6 G/DL (ref 11.9–15.1)
IMMATURE GRANULOCYTES: 0 %
LYMPHOCYTES # BLD: 39 % (ref 25–45)
MCH RBC QN AUTO: 26.5 PG (ref 25.2–33.5)
MCHC RBC AUTO-ENTMCNC: 31.5 G/DL (ref 28.4–34.8)
MCV RBC AUTO: 84 FL (ref 82.6–102.9)
MONOCYTES # BLD: 8 % (ref 2–8)
NRBC AUTOMATED: 0 PER 100 WBC
PDW BLD-RTO: 13.7 % (ref 11.8–14.4)
PLATELET # BLD AUTO: 284 K/UL (ref 138–453)
PMV BLD AUTO: 9.5 FL (ref 8.1–13.5)
POTASSIUM SERPL-SCNC: 3.7 MMOL/L (ref 3.7–5.3)
RBC # BLD: 4.38 M/UL (ref 3.95–5.11)
SEG NEUTROPHILS: 52 % (ref 34–64)
SEGMENTED NEUTROPHILS ABSOLUTE COUNT: 4.01 K/UL (ref 1.8–8)
SODIUM SERPL-SCNC: 138 MMOL/L (ref 135–144)
TSH SERPL-ACNC: 2.96 UIU/ML (ref 0.3–5)
WBC # BLD AUTO: 7.8 K/UL (ref 4.5–13.5)

## 2023-04-28 PROCEDURE — 85379 FIBRIN DEGRADATION QUANT: CPT

## 2023-04-28 PROCEDURE — 99284 EMERGENCY DEPT VISIT MOD MDM: CPT

## 2023-04-28 PROCEDURE — 84443 ASSAY THYROID STIM HORMONE: CPT

## 2023-04-28 PROCEDURE — 85025 COMPLETE CBC W/AUTO DIFF WBC: CPT

## 2023-04-28 PROCEDURE — 6370000000 HC RX 637 (ALT 250 FOR IP)

## 2023-04-28 PROCEDURE — 80048 BASIC METABOLIC PNL TOTAL CA: CPT

## 2023-04-28 PROCEDURE — 93005 ELECTROCARDIOGRAM TRACING: CPT

## 2023-04-28 PROCEDURE — 84703 CHORIONIC GONADOTROPIN ASSAY: CPT

## 2023-04-28 RX ORDER — ALPRAZOLAM 0.25 MG/1
0.25 TABLET ORAL ONCE
Status: COMPLETED | OUTPATIENT
Start: 2023-04-28 | End: 2023-04-28

## 2023-04-28 RX ADMIN — ALPRAZOLAM 0.25 MG: 0.25 TABLET ORAL at 01:09

## 2023-04-28 ASSESSMENT — ENCOUNTER SYMPTOMS
RHINORRHEA: 0
ABDOMINAL PAIN: 0
VOMITING: 0
NAUSEA: 0
PHOTOPHOBIA: 0

## 2023-04-28 ASSESSMENT — PAIN - FUNCTIONAL ASSESSMENT: PAIN_FUNCTIONAL_ASSESSMENT: 0-10

## 2023-04-28 ASSESSMENT — PAIN SCALES - GENERAL: PAINLEVEL_OUTOF10: 0

## 2023-04-28 NOTE — ED NOTES
The following labs were labeled with appropriate pt sticker and tubed to lab:     [x] Blue     [x] Lavender   [] on ice  [x] Green/yellow  [] Green/black [] on ice  [] David Poe  [] on ice  [] Yellow  [] Red  [] Type/ Screen  [] ABG  [] VBG    [] COVID-19 swab    [] Rapid  [] PCR  [] Flu swab  [] Peds Viral Panel     [] Urine Sample  [] Fecal Sample  [] Pelvic Cultures  [] Blood Cultures  [] X 2  [] STREP Cultures       Vick Nettles, ADI  04/28/23 0112

## 2023-04-28 NOTE — DISCHARGE INSTRUCTIONS
You were seen today in the emergency department for your anxiety. We have evaluated you and determined that you likely have had a panic attack. We now feel you are safe for discharge home. Please return to the emergency department immediately if develop any new or worsening concerns including chest pain, shortness of breath, abdominal pain, nausea, vomiting, diarrhea, weakness, loss consciousness, fever, chills, or any other concerns. Please call your PCP and schedule appointment within the next 24 to 48 hours for follow-up.

## 2023-04-28 NOTE — ED PROVIDER NOTES
today and feels anxious and tremulous. She took her normal dose of hydroxyzine and states it did not help her. She is uncertain whether this is actually an anxiety attack since she feels different than normal.  She is holding her abdomen saying that something is wrong in her abdomen and she is uncertain whether there is pain or nausea. She denies cough shortness of breath sputum or hemoptysis. No fevers. She is uncertain regarding other symptoms. On exam she is borderline tachycardic no visible tremor vital signs normal.  She has mydriasis. Cranial nerves and motor strength intact. Lungs are clear. Abdomen soft nontender. She has mild tenderness over both calves but no edema or cords. Impression is anxiety, tachycardia. Plan is laboratory studies, anxiolytics, reassess. My suspicion for venous thromboembolism is low, will use YEARS criteria of D-dimer 1.0 for screening. Rafaela Dickinson.  Marilu Beard MD, [address]  Attending Emergency  Physician                Garfield Penn MD  04/28/23 0893       Garfield Penn MD  04/28/23 4635
equal, round, and reactive to light. Cardiovascular:      Rate and Rhythm: Normal rate and regular rhythm. Pulses: Normal pulses. Heart sounds: Normal heart sounds. Pulmonary:      Effort: Pulmonary effort is normal.      Breath sounds: Normal breath sounds. Abdominal:      General: Abdomen is flat. Palpations: Abdomen is soft. Tenderness: There is no abdominal tenderness. Musculoskeletal:         General: No tenderness. Normal range of motion. Skin:     General: Skin is warm and dry. Capillary Refill: Capillary refill takes less than 2 seconds. Neurological:      General: No focal deficit present. Mental Status: She is alert and oriented to person, place, and time. DDX/DIAGNOSTIC RESULTS / EMERGENCY DEPARTMENT COURSE / MDM     Medical Decision Making  Kimmy Delarosa is a 23 y.o. female who presents with concern for an anxiety attack. Patient is GCS 15, nontoxic appearing, not in acute distress, speaking full sentences, able to ambulate under their own power. Patient is afebrile, normotensive, nontachycardic, satting well on room air. Examination reveals lungs are clear to auscultation bilaterally. Abdomen soft nontender. Peripheral pulses are 2+ throughout. Patient has difficulty answering some questions, states she is not sure if she is having chest pain or shortness of breath. Given history of PE we will perform laboratory work including CBC, BMP, D-dimer, hCG, TSH given history of thyroid disorder. Also given 0.25 of Xanax. If laboratory work-up is unremarkable patient can likely be discharged home with return precautions, follow-up with PCP. Amount and/or Complexity of Data Reviewed  Labs: ordered. ECG/medicine tests: ordered. Risk  Prescription drug management.         EKG  Normal sinus rhythm, normal axis, regular rate, no ST elevations or depressions, no T wave inversions, normal sinus EKG    All EKG's are interpreted by the Emergency

## 2023-04-30 LAB
EKG ATRIAL RATE: 90 BPM
EKG P AXIS: 52 DEGREES
EKG P-R INTERVAL: 188 MS
EKG Q-T INTERVAL: 348 MS
EKG QRS DURATION: 84 MS
EKG QTC CALCULATION (BAZETT): 425 MS
EKG R AXIS: 71 DEGREES
EKG T AXIS: 50 DEGREES
EKG VENTRICULAR RATE: 90 BPM

## 2023-04-30 PROCEDURE — 93010 ELECTROCARDIOGRAM REPORT: CPT | Performed by: INTERNAL MEDICINE

## 2023-05-16 ENCOUNTER — HOSPITAL ENCOUNTER (EMERGENCY)
Age: 23
Discharge: HOME | End: 2023-05-16
Attending: EMERGENCY MEDICINE
Payer: COMMERCIAL

## 2023-05-16 VITALS
DIASTOLIC BLOOD PRESSURE: 80 MMHG | RESPIRATION RATE: 18 BRPM | SYSTOLIC BLOOD PRESSURE: 128 MMHG | HEART RATE: 84 BPM | OXYGEN SATURATION: 98 % | TEMPERATURE: 96.8 F

## 2023-05-16 DIAGNOSIS — B00.1 COLD SORE: Primary | ICD-10-CM

## 2023-05-16 PROCEDURE — 99283 EMERGENCY DEPT VISIT LOW MDM: CPT

## 2023-05-16 PROCEDURE — 6370000000 HC RX 637 (ALT 250 FOR IP): Performed by: STUDENT IN AN ORGANIZED HEALTH CARE EDUCATION/TRAINING PROGRAM

## 2023-05-16 RX ORDER — LIDOCAINE HYDROCHLORIDE 20 MG/ML
15 SOLUTION OROPHARYNGEAL ONCE
Status: COMPLETED | OUTPATIENT
Start: 2023-05-16 | End: 2023-05-16

## 2023-05-16 RX ADMIN — LIDOCAINE HYDROCHLORIDE 15 ML: 20 SOLUTION ORAL at 12:28

## 2023-05-16 NOTE — ED PROVIDER NOTES
[facility] ED     Emergency Department     Faculty Attestation    I performed a history and physical examination of the patient and discussed management with the resident. I reviewed the residents note and agree with the documented findings and plan of care. Any areas of disagreement are noted on the chart. I was personally present for the key portions of any procedures. I have documented in the chart those procedures where I was not present during the key portions. I have reviewed the emergency nurses triage note. I agree with the chief complaint, past medical history, past surgical history, allergies, medications, social and family history as documented unless otherwise noted below. For Physician Assistant/ Nurse Practitioner cases/documentation I have personally evaluated this patient and have completed at least one if not all key elements of the E/M (history, physical exam, and MDM). Additional findings are as noted. Note Started: 11:48 AM EDT    Patient here with lesions on her right lower lip. States she was viscous lidocaine yesterday but was told at the pharmacy that there is a shortage of this and states that she is unable to eat because of the pain but is still drinking no trouble swallowing. On exam patient has a small cold sore in the right lower medial lip. No other oral lesions no pharyngeal lesions no drooling stridor dysphonia no adenopathy.   Reassurance provided will discharge home      Critical Care     none    Janis Grover MD, Jessica Estimable  Attending Emergency  Physician           Janis Grover MD  05/16/23 [phone] [id number]

## 2023-05-16 NOTE — DISCHARGE INSTRUCTIONS
Thank you for visiting [facility] Emergency Department. Have a cold sore. You can use the Magic mouthwash 5 mL 4 times daily as needed. You swish this around in your mouth and then spit it out. You can also use Orajel topically    If you develop any worsening of your symptoms, severe pain, chest pain, trouble breathing, passing out, vomiting or other worrisome symptoms please return to the emergency department immediately.      Please call your primary care provider as soon as possible for follow up

## 2023-05-16 NOTE — ED NOTES
This patient was assessed by the doctor only. Nurse processed and completed the orders from this doctor ie labs, meds, and/or EKG.        Venus Lott, JIA  08/30/62 6465

## 2023-05-19 ASSESSMENT — ENCOUNTER SYMPTOMS
SHORTNESS OF BREATH: 0
COUGH: 0
DIARRHEA: 0
ABDOMINAL PAIN: 0
NAUSEA: 0
RHINORRHEA: 0
VOMITING: 0

## 2023-05-19 NOTE — ED PROVIDER NOTES
[facility] ED  Emergency Department Encounter  Emergency Medicine Resident     Pt Name: Ari Owens  MRN: [id number]  [place] 2000  Date of evaluation: 5/19/23  PCP:  No primary care provider on file. CHIEF COMPLAINT       Chief Complaint   Patient presents with    Mouth Lesions       HISTORY OFPRESENT ILLNESS  (Location/Symptom, Timing/Onset, Context/Setting, Quality, Duration, Modifying Ilean Seller.)      Ari Owens is a 23 y.o. female who presents with a cold sore to the lower lip which occurred a few days ago. Patient was prescribed viscous lidocaine by [facility], however she was told by the pharmacy that there is a shortage of this and that they were unable to fill it. Patient reports significant pain associated with a cold sore and states she is unable to eat secondary to the pain. States\" touching the cold sore make it worse. Denies any other lesions. Denies sore throat, fevers, chills rhinorrhea, congestion, nausea, vomiting. PAST MEDICAL / SURGICAL / SOCIAL / FAMILY HISTORY      has a past medical history of Anxiety, Bipolar 1 disorder ([address] Heart murmur, PTSD (post-traumatic stress disorder), and Thyroid disease. Denies past surgical history    Social:  reports that she has never smoked. She has never used smokeless tobacco. She reports that she does not currently use alcohol. She reports that she does not currently use drugs. Family Hx: No family history on file. Allergies:  Penicillins and Vyvanse [lisdexamfetamine]    Home Medications:  Prior to Admission medications    Medication Sig Start Date End Date Taking?  Authorizing Provider   Magic Mouthwash (MIRACLE MOUTHWASH) Swish and spit 5 mLs 4 times daily as needed for Irritation 5/16/23 5/21/23 Yes Gisela Heart, DO   benzocaine (ORAJEL) 20 % GEL mucosal gel Apply topically 4 times daily as needed 5/16/23  Yes India Hamilton,    melatonin (RA MELATONIN) 3 MG TABS tablet Take 1 tablet by

## 2023-06-20 ENCOUNTER — HOSPITAL ENCOUNTER (OUTPATIENT)
Age: 23
Discharge: HOME | End: 2023-06-22
Payer: COMMERCIAL

## 2023-06-20 ENCOUNTER — HOSPITAL ENCOUNTER (OUTPATIENT)
Dept: GENERAL RADIOLOGY | Age: 23
Discharge: HOME | End: 2023-06-22
Payer: COMMERCIAL

## 2023-06-20 DIAGNOSIS — M41.9 PAIN ASSOCIATED WITH SCOLIOSIS: ICD-10-CM

## 2023-06-20 PROCEDURE — 72072 X-RAY EXAM THORAC SPINE 3VWS: CPT

## 2023-06-20 PROCEDURE — 72100 X-RAY EXAM L-S SPINE 2/3 VWS: CPT

## 2023-08-09 ENCOUNTER — HOSPITAL ENCOUNTER (EMERGENCY)
Age: 23
Discharge: ANOTHER HOSPITAL | DRG: 754 | End: 2023-08-10
Attending: EMERGENCY MEDICINE
Payer: COMMERCIAL

## 2023-08-09 DIAGNOSIS — F41.0 PANIC ATTACK: Primary | ICD-10-CM

## 2023-08-09 DIAGNOSIS — R45.851 SUICIDAL IDEATION: ICD-10-CM

## 2023-08-09 LAB
AMPHET UR QL SCN: NEGATIVE
ANION GAP SERPL CALCULATED.3IONS-SCNC: 10 MMOL/L (ref 9–17)
APAP SERPL-MCNC: <5 UG/ML (ref 10–30)
BARBITURATES UR QL SCN: NEGATIVE
BASOPHILS # BLD: <0.03 K/UL (ref 0–0.2)
BASOPHILS NFR BLD: 0 % (ref 0–2)
BENZODIAZ UR QL: NEGATIVE
BUN SERPL-MCNC: 10 MG/DL (ref 6–20)
CALCIUM SERPL-MCNC: 11.1 MG/DL (ref 8.6–10.4)
CANNABINOIDS UR QL SCN: NEGATIVE
CHLORIDE SERPL-SCNC: 102 MMOL/L (ref 98–107)
CO2 SERPL-SCNC: 27 MMOL/L (ref 20–31)
COCAINE UR QL SCN: NEGATIVE
CREAT SERPL-MCNC: 0.5 MG/DL (ref 0.5–0.9)
EOSINOPHIL # BLD: 0.05 K/UL (ref 0–0.44)
EOSINOPHILS RELATIVE PERCENT: 1 % (ref 1–4)
ERYTHROCYTE [DISTWIDTH] IN BLOOD BY AUTOMATED COUNT: 12.7 % (ref 11.8–14.4)
ETHANOL PERCENT: <0.01 %
ETHANOLAMINE SERPL-MCNC: <10 MG/DL
FENTANYL UR QL: NEGATIVE
GFR SERPL CREATININE-BSD FRML MDRD: >60 ML/MIN/1.73M2
GLUCOSE SERPL-MCNC: 95 MG/DL (ref 70–99)
HCT VFR BLD AUTO: 41.5 % (ref 36.3–47.1)
HGB BLD-MCNC: 13.4 G/DL (ref 11.9–15.1)
IMM GRANULOCYTES # BLD AUTO: <0.03 K/UL (ref 0–0.3)
IMM GRANULOCYTES NFR BLD: 0 %
LYMPHOCYTES NFR BLD: 2.64 K/UL (ref 1.2–5.2)
LYMPHOCYTES RELATIVE PERCENT: 36 % (ref 25–45)
MCH RBC QN AUTO: 27.4 PG (ref 25.2–33.5)
MCHC RBC AUTO-ENTMCNC: 32.3 G/DL (ref 28.4–34.8)
MCV RBC AUTO: 84.9 FL (ref 82.6–102.9)
METHADONE UR QL: NEGATIVE
MONOCYTES NFR BLD: 0.59 K/UL (ref 0.1–1.4)
MONOCYTES NFR BLD: 8 % (ref 2–8)
NEUTROPHILS NFR BLD: 55 % (ref 34–64)
NEUTS SEG NFR BLD: 4.08 K/UL (ref 1.8–8)
NRBC BLD-RTO: 0 PER 100 WBC
OPIATES UR QL SCN: NEGATIVE
OXYCODONE UR QL SCN: NEGATIVE
PCP UR QL SCN: NEGATIVE
PLATELET # BLD AUTO: 263 K/UL (ref 138–453)
PMV BLD AUTO: 9.9 FL (ref 8.1–13.5)
POTASSIUM SERPL-SCNC: 4 MMOL/L (ref 3.7–5.3)
RBC # BLD AUTO: 4.89 M/UL (ref 3.95–5.11)
SALICYLATES SERPL-MCNC: <1 MG/DL (ref 3–10)
SODIUM SERPL-SCNC: 139 MMOL/L (ref 135–144)
TEST INFORMATION: NORMAL
TOXIC TRICYCLIC SC,BLOOD: NEGATIVE
WBC OTHER # BLD: 7.4 K/UL (ref 4.5–13.5)

## 2023-08-09 PROCEDURE — 85025 COMPLETE CBC W/AUTO DIFF WBC: CPT

## 2023-08-09 PROCEDURE — G0480 DRUG TEST DEF 1-7 CLASSES: HCPCS

## 2023-08-09 PROCEDURE — 80307 DRUG TEST PRSMV CHEM ANLYZR: CPT

## 2023-08-09 PROCEDURE — 80179 DRUG ASSAY SALICYLATE: CPT

## 2023-08-09 PROCEDURE — 80143 DRUG ASSAY ACETAMINOPHEN: CPT

## 2023-08-09 PROCEDURE — 99285 EMERGENCY DEPT VISIT HI MDM: CPT

## 2023-08-09 PROCEDURE — 93005 ELECTROCARDIOGRAM TRACING: CPT

## 2023-08-09 PROCEDURE — 80048 BASIC METABOLIC PNL TOTAL CA: CPT

## 2023-08-09 ASSESSMENT — ENCOUNTER SYMPTOMS: SHORTNESS OF BREATH: 1

## 2023-08-09 ASSESSMENT — PAIN - FUNCTIONAL ASSESSMENT: PAIN_FUNCTIONAL_ASSESSMENT: NONE - DENIES PAIN

## 2023-08-10 ENCOUNTER — HOSPITAL ENCOUNTER (INPATIENT)
Age: 23
LOS: 5 days | Discharge: HOME | DRG: 754 | End: 2023-08-15
Attending: PSYCHIATRY & NEUROLOGY | Admitting: PSYCHIATRY & NEUROLOGY
Payer: COMMERCIAL

## 2023-08-10 VITALS
TEMPERATURE: 97.4 F | HEART RATE: 68 BPM | BODY MASS INDEX: 20.98 KG/M2 | OXYGEN SATURATION: 97 % | SYSTOLIC BLOOD PRESSURE: 119 MMHG | RESPIRATION RATE: 16 BRPM | DIASTOLIC BLOOD PRESSURE: 71 MMHG | WEIGHT: 130 LBS

## 2023-08-10 PROBLEM — F33.3 SEVERE RECURRENT MAJOR DEPRESSION WITH PSYCHOTIC FEATURES (HCC): Status: ACTIVE | Noted: 2023-08-10

## 2023-08-10 PROCEDURE — 1240000000 HC EMOTIONAL WELLNESS R&B

## 2023-08-10 PROCEDURE — 6370000000 HC RX 637 (ALT 250 FOR IP): Performed by: NURSE PRACTITIONER

## 2023-08-10 PROCEDURE — 6370000000 HC RX 637 (ALT 250 FOR IP): Performed by: PSYCHIATRY & NEUROLOGY

## 2023-08-10 PROCEDURE — 6370000000 HC RX 637 (ALT 250 FOR IP): Performed by: INTERNAL MEDICINE

## 2023-08-10 PROCEDURE — 6370000000 HC RX 637 (ALT 250 FOR IP): Performed by: EMERGENCY MEDICINE

## 2023-08-10 PROCEDURE — 6370000000 HC RX 637 (ALT 250 FOR IP)

## 2023-08-10 PROCEDURE — 99223 1ST HOSP IP/OBS HIGH 75: CPT | Performed by: INTERNAL MEDICINE

## 2023-08-10 RX ORDER — LANOLIN ALCOHOL/MO/W.PET/CERES
3 CREAM (GRAM) TOPICAL DAILY
Status: DISCONTINUED | OUTPATIENT
Start: 2023-08-10 | End: 2023-08-15 | Stop reason: HOSPADM

## 2023-08-10 RX ORDER — PANTOPRAZOLE SODIUM 40 MG/1
40 TABLET, DELAYED RELEASE ORAL
Status: DISCONTINUED | OUTPATIENT
Start: 2023-08-11 | End: 2023-08-15 | Stop reason: HOSPADM

## 2023-08-10 RX ORDER — MAGNESIUM HYDROXIDE/ALUMINUM HYDROXICE/SIMETHICONE 120; 1200; 1200 MG/30ML; MG/30ML; MG/30ML
30 SUSPENSION ORAL EVERY 6 HOURS PRN
Status: DISCONTINUED | OUTPATIENT
Start: 2023-08-10 | End: 2023-08-15 | Stop reason: HOSPADM

## 2023-08-10 RX ORDER — ACETAMINOPHEN 325 MG/1
650 TABLET ORAL EVERY 4 HOURS PRN
Status: DISCONTINUED | OUTPATIENT
Start: 2023-08-10 | End: 2023-08-15 | Stop reason: HOSPADM

## 2023-08-10 RX ORDER — ARIPIPRAZOLE 15 MG/1
15 TABLET ORAL ONCE
Status: COMPLETED | OUTPATIENT
Start: 2023-08-10 | End: 2023-08-10

## 2023-08-10 RX ORDER — ARIPIPRAZOLE 20 MG/1
20 TABLET ORAL DAILY
Status: ON HOLD | COMMUNITY
End: 2023-08-15 | Stop reason: SDUPTHER

## 2023-08-10 RX ORDER — POLYETHYLENE GLYCOL 3350 17 G/17G
17 POWDER, FOR SOLUTION ORAL DAILY PRN
Status: DISCONTINUED | OUTPATIENT
Start: 2023-08-10 | End: 2023-08-15 | Stop reason: HOSPADM

## 2023-08-10 RX ORDER — ARIPIPRAZOLE 20 MG/1
20 TABLET ORAL NIGHTLY
Status: DISCONTINUED | OUTPATIENT
Start: 2023-08-10 | End: 2023-08-15 | Stop reason: HOSPADM

## 2023-08-10 RX ORDER — CLONAZEPAM 0.5 MG/1
0.5 TABLET ORAL 2 TIMES DAILY PRN
Status: ON HOLD | COMMUNITY
End: 2023-08-15 | Stop reason: HOSPADM

## 2023-08-10 RX ORDER — TRAZODONE HYDROCHLORIDE 50 MG/1
50 TABLET ORAL NIGHTLY PRN
Status: DISCONTINUED | OUTPATIENT
Start: 2023-08-10 | End: 2023-08-15 | Stop reason: HOSPADM

## 2023-08-10 RX ORDER — ARIPIPRAZOLE 20 MG/1
20 TABLET ORAL DAILY
Status: DISCONTINUED | OUTPATIENT
Start: 2023-08-10 | End: 2023-08-10

## 2023-08-10 RX ORDER — MIRTAZAPINE 7.5 MG/1
7.5 TABLET, FILM COATED ORAL NIGHTLY
Status: ON HOLD | COMMUNITY
End: 2023-08-15 | Stop reason: SDUPTHER

## 2023-08-10 RX ORDER — IBUPROFEN 600 MG/1
600 TABLET ORAL EVERY 8 HOURS PRN
COMMUNITY

## 2023-08-10 RX ORDER — MIRTAZAPINE 15 MG/1
7.5 TABLET, FILM COATED ORAL NIGHTLY
Status: DISCONTINUED | OUTPATIENT
Start: 2023-08-10 | End: 2023-08-15 | Stop reason: HOSPADM

## 2023-08-10 RX ORDER — IBUPROFEN 400 MG/1
400 TABLET ORAL EVERY 6 HOURS PRN
Status: DISCONTINUED | OUTPATIENT
Start: 2023-08-10 | End: 2023-08-15 | Stop reason: HOSPADM

## 2023-08-10 RX ORDER — POLYETHYLENE GLYCOL 3350 17 G
2 POWDER IN PACKET (EA) ORAL
Status: DISCONTINUED | OUTPATIENT
Start: 2023-08-10 | End: 2023-08-15 | Stop reason: HOSPADM

## 2023-08-10 RX ORDER — LANOLIN ALCOHOL/MO/W.PET/CERES
3 CREAM (GRAM) TOPICAL ONCE
Status: COMPLETED | OUTPATIENT
Start: 2023-08-10 | End: 2023-08-10

## 2023-08-10 RX ADMIN — ARIPIPRAZOLE 15 MG: 15 TABLET ORAL at 01:13

## 2023-08-10 RX ADMIN — Medication 3 MG: at 21:20

## 2023-08-10 RX ADMIN — ACETAMINOPHEN 650 MG: 325 TABLET ORAL at 19:57

## 2023-08-10 RX ADMIN — APIXABAN 5 MG: 5 TABLET, FILM COATED ORAL at 06:17

## 2023-08-10 RX ADMIN — MIRTAZAPINE 7.5 MG: 15 TABLET, FILM COATED ORAL at 21:20

## 2023-08-10 RX ADMIN — ARIPIPRAZOLE 20 MG: 20 TABLET ORAL at 21:21

## 2023-08-10 RX ADMIN — Medication 3 MG: at 01:13

## 2023-08-10 RX ADMIN — APIXABAN 5 MG: 5 TABLET, FILM COATED ORAL at 21:23

## 2023-08-10 ASSESSMENT — PAIN DESCRIPTION - LOCATION: LOCATION: ANKLE

## 2023-08-10 ASSESSMENT — PAIN SCALES - GENERAL
PAINLEVEL_OUTOF10: 2
PAINLEVEL_OUTOF10: 10

## 2023-08-10 ASSESSMENT — SLEEP AND FATIGUE QUESTIONNAIRES
SLEEP PATTERN: DIFFICULTY FALLING ASLEEP
DO YOU HAVE DIFFICULTY SLEEPING: NO
AVERAGE NUMBER OF SLEEP HOURS: 8
DO YOU USE A SLEEP AID: YES

## 2023-08-10 ASSESSMENT — LIFESTYLE VARIABLES
HOW OFTEN DO YOU HAVE A DRINK CONTAINING ALCOHOL: NEVER
HOW OFTEN DO YOU HAVE A DRINK CONTAINING ALCOHOL: NEVER
HOW MANY STANDARD DRINKS CONTAINING ALCOHOL DO YOU HAVE ON A TYPICAL DAY: PATIENT DOES NOT DRINK
HOW MANY STANDARD DRINKS CONTAINING ALCOHOL DO YOU HAVE ON A TYPICAL DAY: PATIENT DOES NOT DRINK

## 2023-08-10 ASSESSMENT — PAIN DESCRIPTION - ORIENTATION: ORIENTATION: LEFT

## 2023-08-10 ASSESSMENT — PAIN DESCRIPTION - DESCRIPTORS: DESCRIPTORS: SHARP

## 2023-08-10 NOTE — ED PROVIDER NOTES
[facility] ED  Emergency Department Encounter  Emergency Medicine Resident     Pt Verle Bumpers  MRN: [id number]  [address] [facility] [address]  Date of evaluation: 8/9/23  PCP:  No primary care provider on file. Note Started: 8:42 PM EDT      CHIEF COMPLAINT       Chief Complaint   Patient presents with    Suicidal     No plan    Anxiety       HISTORY OF PRESENT ILLNESS  (Location/Symptom, Timing/Onset, Context/Setting, Quality, Duration, Modifying Factors, Severity.)      Thea Fernández is a 23 y.o. female with history of anxiety and panic attacks who presents with a panic attack that started after her friend kicked her out of the house. Patient has been staying with this friend until her and her boyfriend can get a place together. Patient is complaining of some midsternal chest pain, shortness of breath, and lightheadedness that she states is typical with her panic attacks. She states she normally takes Klonopin but she is not sure where her medications are. Patient also complaining of some suicidal ideation. No plan. Denies any homicidal ideation. She also admits to visual and auditory hallucinations. Patient does have a history of a pulmonary embolism and is supposed to be on Eliquis. However, patient states she has not been taking as she does not know where her medications are. PAST MEDICAL / SURGICAL / SOCIAL / FAMILY HISTORY      has a past medical history of Anxiety, Bipolar 1 disorder ([address]), Heart murmur, PTSD (post-traumatic stress disorder), and Thyroid disease. has no past surgical history on file.     Social History     Socioeconomic History    Marital status: Single     Spouse name: Not on file    Number of children: Not on file    Years of education: Not on file    Highest education level: Not on file   Occupational History    Not on file   Tobacco Use    Smoking status: Never    Smokeless tobacco: Never   Substance and Sexual Activity    Alcohol use: Not Currently note that portions of this note were completed with a voice recognition program.  Efforts were made to edit the dictations but occasionally words are mis-transcribed.)       Markell Sanchez MD  Resident  08/09/23 2705

## 2023-08-10 NOTE — ED NOTES
Pt presents to the ED with c/o of anxiety and SI. Pt states she is anxious and SI after being \"kicked out\" of her friends house whom patient was staying with. Pt states her parents live in [place] and pt moved here with her friend from [facility]. Pt states she takes multiple medications for psychiatric disorders but states she did not take her medications today d/t \"not knowing\" where medications are. Pt states she has had a suicidal attempt in the past.   Pt states she is feeling depressed and suicidal but currently does not have a plan. Pt denies HI. Pt denies pain. Pt axo x4, RR even and unlabored, NAD. Pt changed out into paper scrubs, 1:1 guard initiated, belongings locked up by [facility] VANESA CHAVIRA.         Puja Hay RN  08/09/23 5761

## 2023-08-10 NOTE — BH NOTE
Patient arrived to potier unit via stretcher from [facility] ED. Patient signed all consents. Patient oriented to unit and unit policies. Patient verbalized understanding of orientation.

## 2023-08-10 NOTE — CARE COORDINATION
BHI Biopsychosocial Assessment    Current Level of Psychosocial Functioning     Independent X  Dependent    Minimal Assist     Comments:    Psychosocial High Risk Factors (check all that apply)    Unable to obtain meds   Chronic illness/pain    Substance abuse   Lack of Family Support X  Financial stress x  Isolation   Inadequate Community Resources  Suicide attempt(s)   Not taking medications   Victim of crime   Developmental Delay  Unable to manage personal needs    Age 72 or older   Homeless X  No transportation   Readmission within 30 days  Unemployment X  Traumatic Event     Comments:   Psychiatric Advanced Directives: Denies    Family to Involve in Treatment: Denies    Sexual Orientation:  n/a    Patient Strengths: Pt linked to [employer], has insurance    Patient Barriers: Homeless, lack of support      Opiate Education Provided:  denies opiate use      CMHC/mental health history: Linked with [facility]    Plan of Care   medication management, group/individual therapies, family meetings, psycho -education, treatment team meetings to assist with stabilization    Initial Discharge Plan: Friend [address]. Pt currently has 30 day ban at [facility]. Pt to follow up with established services at [facility]. Clinical Summary: Pt is a 23year old female admitted to [facility] for SI without plan or intent. Pt has a hx of one prior admission to [facility] in March of 2023. Pt states she has been kicked out of her friends house where she has been staying which0\" made her have a panic attack\". Pt states she was recently at [facility] and is on a 30 day ban there. Pt states she left [facility] recently \"due to anxiety\". Pt believes she can stay with her friend Flynn Jackson upon discharge. SW encouraged pt to call and verify. Pt is linked with [address] and will continue services there at discharge. Pt denied SI, HI, AH, and VH.

## 2023-08-10 NOTE — BH NOTE
[facility]  Admission Note     Admission Type:   Admission Type: Voluntary    Reason for admission:  Reason for Admission: Patient presented to emergency department with sucidial ideations with no plan. Patient endorses auditory hallucinations telling her to kill herself. Patient got into argument with a frined and stated friend \"blew up and kicked me out. \"      Addictive Behavior:   Addictive Behavior  In the Past 3 Months, Have You Felt or Has Someone Told You That You Have a Problem With  : None    Medical Problems:   Past Medical History:   Diagnosis Date    Anxiety     Bipolar 1 disorder ([address])     Heart murmur     PTSD (post-traumatic stress disorder)     Thyroid disease        Status EXAM:  Mental Status and Behavioral Exam  Normal: No  Level of Assistance: Independent/Self  Facial Expression: Brightened  Affect: Incongruent  Level of Consciousness: Alert  Frequency of Checks: 4 times per hour, close  Mood:Normal: No  Mood: Depressed, Anxious, Other (comment) (reports feeling anxious and depressed but is smiling, joking, and laughing on unit.)  Motor Activity:Normal: Yes  Eye Contact: Fair  Observed Behavior: Friendly, Cooperative (smiling, joking, and laughing on unit.)  Sexual Misconduct History: Current - no  Preception: [place] to person, [place] to time, [place] to place, [place] to situation  Attention:Normal: No  Attention: Distractible  Thought Processes: Circumstantial  Thought Content:Normal: No  Thought Content: Preoccupations  Depression Symptoms: Feelings of helplessness  Anxiety Symptoms: Generalized  Katharina Symptoms: No problems reported or observed. Hallucinations:  Auditory (comment) (Voices saying \"to do it\")  Delusions: No  Memory:Normal: No  Memory: Poor recent  Insight and Judgment: No  Insight and Judgment: Poor judgment, Poor insight, Unmotivated    Tobacco Screening:  Practical Counseling, on admission, rafaela X, if applicable and completed (first 3 are required if patient

## 2023-08-10 NOTE — PLAN OF CARE
[facility]  Initial Interdisciplinary Treatment Plan NO      Original treatment plan Date & Time: 8/10/2023   1412    Admission Type:  Admission Type: Voluntary    Reason for admission:   Reason for Admission: Patient presented to emergency department with sucidial ideations with no plan. Patient endorses auditory hallucinations telling her to kill herself. Patient got into argument with a frined and stated friend \"blew up and kicked me out. \"    Estimated Length of Stay:  5-7days  Estimated Discharge Date: to be determined by physician    PATIENT STRENGTHS:  Patient Strengths:   Patient Strengths and Limitations:   Addictive Behavior: Addictive Behavior  In the Past 3 Months, Have You Felt or Has Someone Told You That You Have a Problem With  : None  Medical Problems:  Past Medical History:   Diagnosis Date    Anxiety     Bipolar 1 disorder ([address])     Heart murmur     PTSD (post-traumatic stress disorder)     Thyroid disease      Status EXAM:Mental Status and Behavioral Exam  Normal: No  Level of Assistance: Independent/Self  Facial Expression: Brightened  Affect: Incongruent  Level of Consciousness: Alert  Frequency of Checks: 4 times per hour, close  Mood:Normal: No  Mood: Depressed, Anxious, Other (comment) (reports feeling anxious and depressed but is smiling, joking, and laughing on unit.)  Motor Activity:Normal: Yes  Eye Contact: Fair  Observed Behavior: Friendly, Cooperative (smiling, joking, and laughing on unit.)  Sexual Misconduct History: Current - no  Preception: [place] to person, [place] to time, [place] to place, [place] to situation  Attention:Normal: No  Attention: Distractible  Thought Processes: Circumstantial  Thought Content:Normal: No  Thought Content: Preoccupations  Depression Symptoms: Feelings of helplessness  Anxiety Symptoms: Generalized  Katharina Symptoms: No problems reported or observed. Hallucinations:  Auditory (comment) (Voices saying \"to do it\")  Delusions:

## 2023-08-10 NOTE — ED NOTES
[] [address]    [] [address]    [x]  [address] ASSESSMENT      Y  N     [x] [] In the past two weeks have you had thoughts of hurting yourself in any way? [x] [] In the past two weeks have you had thoughts that you would be better off dead? [] [x] Have you made a suicide attempt in the past two months? [] [x] Do you have a plan for hurting yourself or suicide? [] [x] Presence of hallucinations/voices related to hurting himself or herself or someone else. SUICIDE/SECURITY WATCH PRECAUTION CHECKLIST     Orders    [x]  Suicide/Security Watch Precautions initiated as checked below:   8/10/23 1:00 AM EDT 29/29    [x] Notified physician:  Fay Ha MD  8/10/23 1:00 AM EDT    [x] Orders obtained as appropriate:     [x] 1:1 Observer     [] Psych Consult     [] Psych Consult    Name:  Date:  Time:    [x] 1:1 Observer, Notified by: Tej Martinez RN    Contact Nurse Supervisor    [x] Remove all personal clothes from room and place in snap/paper gown/pants. Slipper only    [x] Remove all personal belongings from room and secured away from patient. Documentation    [x] Initiate Suicide/Security Watch Precaution Flow Sheet    [x] Initiate individualized Care Plan/Problem    [x] Document why precautions initiated on flow sheet (Initiate Nursing Care Plan/Problem)    [x] 1:1 Observer in place; instructions provided. Suicide precautions require observer be within arms length. [x] Nurse-Observer Communication Hand-off initiated by RN, reviewed with Observer. Subsequently used as Hand Off between Observers. [x] Initiate every 15 minute observations per observer as delegated by the RN.     [x] Initiate RN assessment and documentation    Environmental Scan  Search Criteria and Process: OPTIONAL, see Search Policy    [] Reason for search:    [x] Nursing in presence of second person to search patient    [x] Patient notified of reason for body assessment and belongings or implied suicidal ideation/behavior  [x] Depression  [] Suicide attempt      [x] Low self-esteem  [] Hallucinations      [x] Feeling of Hopelessness  [] Substance abuse or withdrawal    [x] Dysfunctional family  [] Major traumatic event, eg., divorce, etc   [x] Excessive stress/anxiety    8/10/23    Expected Outcomes    Patient will:   [x] Patient will remain safe for the duration of their stay   [x] Patient's environment will be safe, eg. Free of potential suicide weapons   [x] Verbalize Recovery from suicidal episode and improvement in self-worth   [x] Discuss feeling that precipitated suicide attempt/thoughts/behavior   [x] Will describe available resources for crisis prevention and management   [x] Will verbalize positive coping skills     Nursing Intervention   [x] Assessment and Observations hourly   [x] Suicide Precautions implemented with patient, should be 1:1 observation   [x] Document observation [id number] and RN assessment hourly   [] Consult physician for:    [x] Psychiatric consult    [] Pharmacological therapy    [] Other:    [x] Patient search completed by security   [x] Initiated appropriate safety protocols by removing from the patient's environment anything that could be used to inflict self injury, eg. Order safe tray, snap gown, etc   [x] Maintain open, warm, caring, non-judgmental attitude/manner towards patient   [] Discuss advantages and disadvantages of existing coping methods/skills   [x] Assist and educate patient with identifying present strengths and coping skills   [x] Keep patient informed regarding plan of care and provide clear concise explanations. Provide the patient/family education information as well as telephone numbers and other information about crisis centers, hot lines, and counselors. Discharge Planning:   [x] Referral  [] Groups [] Health agencies  [] Other:             Jerzy Hargrove RN  08/10/23 8804

## 2023-08-10 NOTE — PROGRESS NOTES
[facility] CARE DEPARTMENT - [address] [facility]  PROGRESS NOTE    Shift date: 8.9.2023  Shift day: Wednesday   Shift # 2    Room # 26/H26A   Name: Sukumar Chiu                Anabaptist: unknown   Place of Jew: unknown    Referral: Routine Visit    Admit Date & Time: 8/9/2023  8:41 PM    Assessment:  Sukumar Chiu is a 23 y.o. female in the hospital. Upon entering the room writer observes the patient to be calm and coping but potentially easily startled. Patient expressed gratitude for the support and appeared receptive to care. Intervention:  Writer introduced self and title as  Writer offered space for the patient  to express feelings, needs, and concerns and provided a ministry presence. Outcome:  Patient appears to be calm and coping at this time. Plan:  Chaplains will remain available to offer spiritual and emotional support as needed.       Electronically signed by Leesa Choudhary on 8/9/2023 at [facility] [address] [place] [address]  [phone]       08/09/23 7731   Encounter Summary   Encounter Overview/Reason  Initial Encounter   Service Provided For: Patient   Referral/Consult From: [facility] Unknown   Last Encounter  08/09/23   Complexity of Encounter Low   Begin Time 2250   End Time  2255   Total Time Calculated 5 min   Assessment/Intervention/Outcome   Assessment Coping   Intervention Active listening;Explored/Affirmed feelings, thoughts, concerns   Outcome Coping       Electronically signed by Tati Mccloud on 8/9/2023 at 10:59 PM

## 2023-08-10 NOTE — ED NOTES
The following labs labeled with pt sticker and tubed to lab:     [x] Blue     [x] Lavender   [] on ice  [x] Green/yellow  [] Green/black [] on ice  [x] Yellow  [x] Red  [] Pink      [] COVID-19 swab    [] Rapid  [] PCR  [] Flu Swab  [] Strep Swab  [] Peds Viral Panel     [] Urine Sample  [] Pelvic Cultures  [] Blood Cultures   [] Wound Cultures        Fabiola Paez RN  08/09/23 2463

## 2023-08-10 NOTE — ED NOTES
Report given to [facility]; all questions addressed.       Brittany Tovar RN  08/10/23 3423

## 2023-08-10 NOTE — ED NOTES
[facility]# [id number], RN [phone], [address] [place] [address] 10:30-10:45.      KENISHA Cunningham  08/10/23 6166

## 2023-08-10 NOTE — ED NOTES
The following labs labeled with pt sticker and tubed to lab:     [] Blue     [] Lavender   [] on ice  [] Green/yellow  [] Green/black [] on ice  [] Yellow  [] Red  [] Pink      [] COVID-19 swab    [] Rapid  [] PCR  [] Flu Swab  [] Strep Swab  [] Peds Viral Panel     [x] Urine Sample  [] Pelvic Cultures  [] Blood Cultures   [] Wound Cultures        Fabiola Paez RN  08/09/23 2486

## 2023-08-10 NOTE — ED NOTES
Pt ambulated to bathroom with bedside guard to obtain urine sample      Aime Braun RN  08/09/23 0822

## 2023-08-10 NOTE — ED NOTES
SW reported case to Dr. Juan Enriquez. Pt accepted [facility] under diagnosis of depression with suicidal ideation. Per [facility] charge nurse Brunilda Marvin, pt unable to be transferred until AM due to no available beds at this time.       Malcolm Miller, JESS  08/09/23 7373

## 2023-08-10 NOTE — ED NOTES
Pt presents as a 23 yr old female to the ED due to a panic attack and experiencing suicidal ideations. Pt denies having a plan. Pt reports auditory hallucinations, stating \"they're telling me to do it\". Pt last admitted to [facility] from 3/29/23-4/6/23. Pt reports a past attempt when she was 17 and showed SW scar on her right wrist. Pt denies homicidal ideations. Pt reports she has been triggered today due to \"being kicked out of my friend's house\". Pt reports she was staying with her friend and they were both under an immense amount of stress, \"there was a blow up and she kicked me out\". Pt reports she is \"very anxious\" and presented as tearful throughout assessment. Pt reports she has a past diagnosis of ADHD, PTSD, anxiety, bipolar disorder, and autism. Pt reports she is prescribed melatonin, abilify, klonopin, and mirtazapine. Pt reports she has been taking her meltatonin and mirtazapine consistently, but does not know where her klonopin or abilify are, and could not recall the last time she took them. Pt reports she attends [facility] for outpatient mental health treatment and has been compliant with appointments. Pt reports her next appointment is August 15 for therapy and medication management. Pt denies current and past substance use. Pt denies legal issues. Pt denies social disease history. Pt denies seizure history. Pt reports she is unsure of family history regarding suicide/mental health. Pt reports she is unable to contract for safety at this time. SW to report case to [facility] once labs are complete and medical clearance documented in the chart.       BRANDI Ivy  08/09/23 5492

## 2023-08-11 ENCOUNTER — TELEPHONE (OUTPATIENT)
Dept: VASCULAR SURGERY | Age: 23
End: 2023-08-11

## 2023-08-11 PROCEDURE — 6370000000 HC RX 637 (ALT 250 FOR IP): Performed by: NURSE PRACTITIONER

## 2023-08-11 PROCEDURE — 6370000000 HC RX 637 (ALT 250 FOR IP): Performed by: INTERNAL MEDICINE

## 2023-08-11 PROCEDURE — 6370000000 HC RX 637 (ALT 250 FOR IP): Performed by: PSYCHIATRY & NEUROLOGY

## 2023-08-11 PROCEDURE — 1240000000 HC EMOTIONAL WELLNESS R&B

## 2023-08-11 PROCEDURE — 99232 SBSQ HOSP IP/OBS MODERATE 35: CPT | Performed by: INTERNAL MEDICINE

## 2023-08-11 RX ADMIN — IBUPROFEN 400 MG: 400 TABLET, FILM COATED ORAL at 09:07

## 2023-08-11 RX ADMIN — APIXABAN 5 MG: 5 TABLET, FILM COATED ORAL at 20:34

## 2023-08-11 RX ADMIN — Medication 3 MG: at 20:33

## 2023-08-11 RX ADMIN — ACETAMINOPHEN 650 MG: 325 TABLET ORAL at 12:40

## 2023-08-11 RX ADMIN — MIRTAZAPINE 7.5 MG: 15 TABLET, FILM COATED ORAL at 20:33

## 2023-08-11 RX ADMIN — ARIPIPRAZOLE 20 MG: 20 TABLET ORAL at 20:33

## 2023-08-11 RX ADMIN — PANTOPRAZOLE SODIUM 40 MG: 40 TABLET, DELAYED RELEASE ORAL at 07:19

## 2023-08-11 RX ADMIN — APIXABAN 5 MG: 5 TABLET, FILM COATED ORAL at 09:07

## 2023-08-11 ASSESSMENT — PAIN SCALES - GENERAL
PAINLEVEL_OUTOF10: 10
PAINLEVEL_OUTOF10: 0

## 2023-08-11 ASSESSMENT — PAIN DESCRIPTION - LOCATION
LOCATION: CHEST
LOCATION: BREAST

## 2023-08-11 NOTE — PLAN OF CARE
Problem: Anxiety  Goal: Will report anxiety at manageable levels  Description: INTERVENTIONS:  1. Administer medication as ordered  2. Teach and rehearse alternative coping skills  3. Provide emotional support with 1:1 interaction with staff  8/10/2023 2011 by John Reeder  Outcome: Progressing     Problem: Coping  Goal: Pt/Family able to verbalize concerns and demonstrate effective coping strategies  Description: INTERVENTIONS:  1. Assist patient/family to identify coping skills, available support systems and cultural and spiritual values  2. Provide emotional support, including active listening and acknowledgement of concerns of patient and caregivers  3. Reduce environmental stimuli, as able  4. Instruct patient/family in relaxation techniques, as appropriate  5. Assess for spiritual pain/suffering and initiate Spiritual Care, Psychosocial Clinical Specialist consults as needed  8/10/2023 2011 by John Reeder  Outcome: Progressing   Patient states that some days are better than others when it comes to deal with her suicidal ideations. Patient denies them at this time but they come and go. Patient states that the she has audio hallucinations telling her to harm herself. Patient likes to stay isolative but she states it helps her cope when she comes out.  Patient safety maintained Q15

## 2023-08-11 NOTE — TELEPHONE ENCOUNTER
Went to send letter requesting pt to contact office and it states patient is homeless so I was unable to mail a letter. Arnaldo Felipe

## 2023-08-11 NOTE — PLAN OF CARE
Problem: Anxiety  Goal: Will report anxiety at manageable levels  Description: INTERVENTIONS:  1. Administer medication as ordered  2. Teach and rehearse alternative coping skills  3. Provide emotional support with 1:1 interaction with staff  Outcome: Progressing     Problem: Coping  Goal: Pt/Family able to verbalize concerns and demonstrate effective coping strategies  Description: INTERVENTIONS:  1. Assist patient/family to identify coping skills, available support systems and cultural and spiritual values  2. Provide emotional support, including active listening and acknowledgement of concerns of patient and caregivers  3. Reduce environmental stimuli, as able  4. Instruct patient/family in relaxation techniques, as appropriate  5. Assess for spiritual pain/suffering and initiate Spiritual Care, Psychosocial Clinical Specialist consults as needed  Outcome: Progressing     Problem: Decision Making  Goal: Pt/Family able to effectively weigh alternatives and participate in decision making related to treatment and care  Description: INTERVENTIONS:  1. Determine when there are differences between patient's view, family's view, and healthcare provider's view of condition  2. Facilitate patient and family articulation of goals for care  3. Help patient and family identify pros/cons of alternative solutions  4. Provide information as requested by patient/family  5. Respect patient/family right to receive or not to receive information  6. Serve as a liaison between patient and family and health care team  7. Initiate Consults from Ethics, Palliative Care or initiate [address] [place] as is appropriate  Outcome: Progressing     Problem: Depression/Self Harm  Goal: Effect of psychiatric condition will be minimized and patient will be protected from self harm  Description: INTERVENTIONS:  1. Assess impact of patient's symptoms on level of functioning, self care needs and offer support as indicated  2.  Assess

## 2023-08-11 NOTE — GROUP NOTE
Group Therapy Note    Date: 8/11/2023    Group Start Time: 2868  Group End Time: 3175  Group Topic: Psychoeducation    TARIQ GRECO    [facility]    Group Therapy Note    Attendees: 8/10     Patient's Goal:  Patient will demonstrate improved interpersonal skills    Notes:  Patient attended group and participated    Status After Intervention:  Improved    Participation Level:  Active Listener and Interactive    Participation Quality: Appropriate, Attentive, Sharing, and Supportive      Speech:  normal      Thought Process/Content: Logical  Flight of ideas      Affective Functioning: Congruent      Mood: euthymic      Level of consciousness:  Alert, Oriented x4, and Attentive      Response to Learning: Able to verbalize current knowledge/experience, Able to verbalize/acknowledge new learning, Able to retain information, Able to change behavior, and Progressing to goal      Endings: None Reported    Modes of Intervention: Education, Support, Socialization, and Exploration      Discipline Responsible: Psychoeducational Specialist      Signature:  [facility]

## 2023-08-12 PROCEDURE — 6370000000 HC RX 637 (ALT 250 FOR IP): Performed by: PSYCHIATRY & NEUROLOGY

## 2023-08-12 PROCEDURE — 6370000000 HC RX 637 (ALT 250 FOR IP): Performed by: NURSE PRACTITIONER

## 2023-08-12 PROCEDURE — 1240000000 HC EMOTIONAL WELLNESS R&B

## 2023-08-12 PROCEDURE — 6370000000 HC RX 637 (ALT 250 FOR IP): Performed by: INTERNAL MEDICINE

## 2023-08-12 PROCEDURE — 99231 SBSQ HOSP IP/OBS SF/LOW 25: CPT | Performed by: INTERNAL MEDICINE

## 2023-08-12 RX ADMIN — APIXABAN 5 MG: 5 TABLET, FILM COATED ORAL at 08:05

## 2023-08-12 RX ADMIN — PANTOPRAZOLE SODIUM 40 MG: 40 TABLET, DELAYED RELEASE ORAL at 06:34

## 2023-08-12 RX ADMIN — Medication 3 MG: at 21:12

## 2023-08-12 RX ADMIN — MIRTAZAPINE 7.5 MG: 15 TABLET, FILM COATED ORAL at 21:11

## 2023-08-12 RX ADMIN — APIXABAN 5 MG: 5 TABLET, FILM COATED ORAL at 21:12

## 2023-08-12 RX ADMIN — ARIPIPRAZOLE 20 MG: 20 TABLET ORAL at 21:11

## 2023-08-12 ASSESSMENT — PAIN DESCRIPTION - LOCATION: LOCATION: ANKLE;CHEST

## 2023-08-12 ASSESSMENT — PAIN SCALES - GENERAL
PAINLEVEL_OUTOF10: 0
PAINLEVEL_OUTOF10: 9

## 2023-08-12 ASSESSMENT — LIFESTYLE VARIABLES
HOW MANY STANDARD DRINKS CONTAINING ALCOHOL DO YOU HAVE ON A TYPICAL DAY: PATIENT DOES NOT DRINK
HOW OFTEN DO YOU HAVE A DRINK CONTAINING ALCOHOL: NEVER

## 2023-08-12 NOTE — PLAN OF CARE
Problem: Anxiety  Goal: Will report anxiety at manageable levels  Description: INTERVENTIONS:  1. Administer medication as ordered  2. Teach and rehearse alternative coping skills  3. Provide emotional support with 1:1 interaction with staff  8/11/2023 2356 by Mahsa De Los Santos RN  Outcome: Progressing  Note: Patient reports moderate anxiety. Patient states \"My mom says If I don't get put on an anxiety medication soon, she is gonna call up here and get something done. \" Pt educated on abilify and remeron being medications which can help with mood and anxiety. Patient stated, \"Oh, well I didn't know that. I will call my mom back then. \" Patient denied any further anxiety after this education. Q 15 minute checks done on pt for safety      Problem: Coping  Goal: Pt/Family able to verbalize concerns and demonstrate effective coping strategies  Description: INTERVENTIONS:  1. Assist patient/family to identify coping skills, available support systems and cultural and spiritual values  2. Provide emotional support, including active listening and acknowledgement of concerns of patient and caregivers  3. Reduce environmental stimuli, as able  4. Instruct patient/family in relaxation techniques, as appropriate  5. Assess for spiritual pain/suffering and initiate Spiritual Care, Psychosocial Clinical Specialist consults as needed  8/11/2023 2356 by Mahsa De Los Santos RN  Outcome: Progressing  Note: Patient encouraged to attend groups to learn coping skills. Patient voiced understanding. Q 15 minute checks done on pt for safety      Problem: Decision Making  Goal: Pt/Family able to effectively weigh alternatives and participate in decision making related to treatment and care  Description: INTERVENTIONS:  1. Determine when there are differences between patient's view, family's view, and healthcare provider's view of condition  2. Facilitate patient and family articulation of goals for care  3.  Help patient and family identify

## 2023-08-12 NOTE — GROUP NOTE
Group Therapy Note    Date: 8/12/2023    Group Start Time: 0746  Group End Time: 1100  Group Topic: Cognitive Skills    TARIQ Mejia, CTRS        Group Therapy Note    Attendees: 6/10       Patient's Goal:  pt will demonstrate improved coping skills and improved social skills     Notes:  pt was pleasant and participated well     Status After Intervention:  Improved    Participation Level:  Active Listener and Interactive    Participation Quality: Appropriate, Sharing, and Supportive      Speech:  normal      Thought Process/Content: Logical      Affective Functioning: Congruent      Mood: euthymic      Level of consciousness:  Alert      Response to Learning: Able to verbalize current knowledge/experience and Progressing to goal      Endings: None Reported    Modes of Intervention: Education, Support, and Problem-solving      Discipline Responsible: Psychoeducational Specialist      Signature:  Rubens Diaz

## 2023-08-12 NOTE — GROUP NOTE
Group Therapy Note    Date: 8/12/2023    Group Start Time: 5040  Group End Time: 0589  Group Topic: Psychoeducation    TARIQ BHSCOTT GRECO    Severa Mody, MSW, BRANDI        Group Therapy Note    Attendees: 4/10       Patient's Goal:  Increase interpersonal relationship skills with open discussion utilizing mental health prompts. Status After Intervention:  Improved    Participation Level:  Active Listener and Interactive    Participation Quality: Appropriate, Attentive, and Sharing      Speech:  normal      Thought Process/Content: Logical      Affective Functioning: Congruent      Mood: euthymic      Level of consciousness:  Alert, Oriented x4, and Attentive      Response to Learning: Able to verbalize current knowledge/experience and Able to verbalize/acknowledge new learning      Endings: None Reported    Modes of Intervention: Support and Socialization      Discipline Responsible: /Counselor      Signature:  Severa Mody, MSW, BRANDI

## 2023-08-12 NOTE — PLAN OF CARE
[facility]  Day 3 Interdisciplinary Treatment Plan NOTE    Review Date & Time: 8/12/23 1300    Admission Type:   Admission Type: Voluntary    Reason for admission:  Reason for Admission: Patient presented to emergency department with sucidial ideations with no plan. Patient endorses auditory hallucinations telling her to kill herself. Patient got into argument with a frined and stated friend \"blew up and kicked me out. \"  Estimated Length of Stay:  5-7 days  Estimated Discharge Date Update:  to be determined by physician    PATIENT STRENGTHS:  Patient Strengths:   Patient Strengths and Limitations:Limitations: Difficult relationships / poor social skills, Difficulty problem solving/relies on others to help solve problems, Apathetic / unmotivated  Addictive Behavior:Addictive Behavior  In the Past 3 Months, Have You Felt or Has Someone Told You That You Have a Problem With  : None  Medical Problems:  Past Medical History:   Diagnosis Date    Anxiety     Bipolar 1 disorder ([address])     Heart murmur     PTSD (post-traumatic stress disorder)     Thyroid disease        Risk:  Fall Risk   Juan Francisco Scale Juan [place] Scale Score: 22  BVC    Change in scores:  No Changes to plan of Care:  No    Status EXAM:   Mental Status and Behavioral Exam  Normal: Yes  Level of Assistance: Independent/Self  Facial Expression: Brightened, Euphoric  Affect: Incongruent  Level of Consciousness: Alert  Frequency of Checks: 4 times per hour, close  Mood:Normal: Yes  Mood: Anxious, Labile, Elated  Motor Activity:Normal: Yes  Motor Activity: Decreased  Eye Contact: Good  Observed Behavior: Cooperative, Friendly, Impulsive  Sexual Misconduct History: Current - no  Preception: [place] to person, [place] to time, [place] to place, [place] to situation  Attention:Normal: No  Attention: Hyperalert  Thought Processes: Blocking  Thought Content:Normal: No  Thought Content: Delusions  Depression Symptoms: No problems reported or observed.   Anxiety

## 2023-08-12 NOTE — PLAN OF CARE
Problem: Anxiety  Goal: Will report anxiety at manageable levels  Description: INTERVENTIONS:  1. Administer medication as ordered  2. Teach and rehearse alternative coping skills  3. Provide emotional support with 1:1 interaction with staff  8/12/2023 1359 by Chelsi Santoyo  Outcome: Progressing    Problem: Coping  Goal: Pt/Family able to verbalize concerns and demonstrate effective coping strategies  Description: INTERVENTIONS:  1. Assist patient/family to identify coping skills, available support systems and cultural and spiritual values  2. Provide emotional support, including active listening and acknowledgement of concerns of patient and caregivers  3. Reduce environmental stimuli, as able  4. Instruct patient/family in relaxation techniques, as appropriate  5. Assess for spiritual pain/suffering and initiate Spiritual Care, Psychosocial Clinical Specialist consults as needed  8/12/2023 1359 by Chelsi Santoyo  Outcome: Progressing     Problem: Decision Making  Goal: Pt/Family able to effectively weigh alternatives and participate in decision making related to treatment and care  Description: INTERVENTIONS:  1. Determine when there are differences between patient's view, family's view, and healthcare provider's view of condition  2. Facilitate patient and family articulation of goals for care  3. Help patient and family identify pros/cons of alternative solutions  4. Provide information as requested by patient/family  5. Respect patient/family right to receive or not to receive information  6. Serve as a liaison between patient and family and health care team  7. Initiate Consults from Ethics, Palliative Care or initiate [address] as is appropriate  8/12/2023 1359 by Chelsi Santoyo  Outcome: Progressing     Problem: Depression/Self Harm  Goal: Effect of psychiatric condition will be minimized and patient will be protected from self harm  Description: INTERVENTIONS:  1.  Assess impact of

## 2023-08-12 NOTE — GROUP NOTE
Group Therapy Note    Date: 8/12/2023    Group Start Time: 0900  Group End Time: 0041  Group Topic: Group Documentation    [facility]    [facility]        Group Therapy Note    Attendees: 5/10           Patient's Goal:  find suitable living situation    Notes:  Morning goal group session, patient has opportunity to reflect on what they need to accomplish to achieve discharge, and decide on a step towards that that they would like to accomplish by the end of the day today. Status After Intervention:  Improved    Participation Level:  Active Listener and Interactive    Participation Quality: Appropriate, Attentive, and Sharing      Speech:  normal      Thought Process/Content: Logical  Linear      Affective Functioning: Congruent      Mood: euthymic      Level of consciousness:  Alert, Oriented x4, and Attentive      Response to Learning: Capable of insight, Able to change behavior, and Progressing to goal      Endings: None Reported    Modes of Intervention: Support and Socialization      Discipline Responsible: Behavorial Health Tech      Signature:  [facility]

## 2023-08-13 LAB
ALBUMIN SERPL-MCNC: 4.1 G/DL (ref 3.5–5.2)
ALP SERPL-CCNC: 67 U/L (ref 35–104)
ALT SERPL-CCNC: 19 U/L (ref 5–33)
AST SERPL-CCNC: 33 U/L
BILIRUB DIRECT SERPL-MCNC: <0.1 MG/DL
BILIRUB INDIRECT SERPL-MCNC: ABNORMAL MG/DL (ref 0–1)
BILIRUB SERPL-MCNC: 0.2 MG/DL (ref 0.3–1.2)
PROT SERPL-MCNC: 7.4 G/DL (ref 6.4–8.3)

## 2023-08-13 PROCEDURE — 99231 SBSQ HOSP IP/OBS SF/LOW 25: CPT | Performed by: INTERNAL MEDICINE

## 2023-08-13 PROCEDURE — 36415 COLL VENOUS BLD VENIPUNCTURE: CPT

## 2023-08-13 PROCEDURE — 6370000000 HC RX 637 (ALT 250 FOR IP): Performed by: INTERNAL MEDICINE

## 2023-08-13 PROCEDURE — 6370000000 HC RX 637 (ALT 250 FOR IP): Performed by: NURSE PRACTITIONER

## 2023-08-13 PROCEDURE — 6370000000 HC RX 637 (ALT 250 FOR IP): Performed by: PSYCHIATRY & NEUROLOGY

## 2023-08-13 PROCEDURE — 1240000000 HC EMOTIONAL WELLNESS R&B

## 2023-08-13 PROCEDURE — 80076 HEPATIC FUNCTION PANEL: CPT

## 2023-08-13 RX ADMIN — APIXABAN 5 MG: 5 TABLET, FILM COATED ORAL at 08:00

## 2023-08-13 RX ADMIN — Medication 3 MG: at 19:52

## 2023-08-13 RX ADMIN — ARIPIPRAZOLE 20 MG: 20 TABLET ORAL at 19:52

## 2023-08-13 RX ADMIN — MIRTAZAPINE 7.5 MG: 15 TABLET, FILM COATED ORAL at 19:52

## 2023-08-13 RX ADMIN — PANTOPRAZOLE SODIUM 40 MG: 40 TABLET, DELAYED RELEASE ORAL at 06:19

## 2023-08-13 RX ADMIN — APIXABAN 5 MG: 5 TABLET, FILM COATED ORAL at 19:52

## 2023-08-13 NOTE — GROUP NOTE
Group Therapy Note    Date: 8/13/2023    Group Start Time: 1000  Group End Time: 1030  Group Topic: Psychoeducation    TARIQ GRECO    GRIS Dela Cruz LSW        Group Therapy Note    Attendees: 5/12       Patient's Goal:  Increase interpersonal relationship skills with open discussion utilizing mental health prompts. Status After Intervention:  Improved    Participation Level:  Active Listener and Interactive    Participation Quality: Appropriate, Attentive, and Sharing      Speech:  normal      Thought Process/Content: Logical      Affective Functioning: Congruent      Mood: euthymic      Level of consciousness:  Alert, Oriented x4, and Attentive      Response to Learning: Able to verbalize current knowledge/experience and Able to verbalize/acknowledge new learning      Endings: None Reported    Modes of Intervention: Support and Socialization      Discipline Responsible: /Counselor      Signature:  GRIS Dlea Cruz LSW

## 2023-08-13 NOTE — PLAN OF CARE
Problem: Anxiety  Goal: Will report anxiety at manageable levels  Description: INTERVENTIONS:  1. Administer medication as ordered  2. Teach and rehearse alternative coping skills  3. Provide emotional support with 1:1 interaction with staff  8/12/2023 2310 by Raimundo Montero RN  Outcome: Progressing  Note: Patient reports she has felt much better today and her anxiety does feel like it's much less than when she was first admitted. Patient declined the need for any further interventions for anxiety other than her scheduled night medications. Q 15 minute checks done on pt for safety      Problem: Coping  Goal: Pt/Family able to verbalize concerns and demonstrate effective coping strategies  Description: INTERVENTIONS:  1. Assist patient/family to identify coping skills, available support systems and cultural and spiritual values  2. Provide emotional support, including active listening and acknowledgement of concerns of patient and caregivers  3. Reduce environmental stimuli, as able  4. Instruct patient/family in relaxation techniques, as appropriate  5. Assess for spiritual pain/suffering and initiate Spiritual Care, Psychosocial Clinical Specialist consults as needed  8/12/2023 2310 by Raimundo Montero RN  Outcome: Progressing  Note: Patient encouraged to attend groups to learn coping skills. Patient voiced understanding. Q 15 minute checks done on pt for safety      Problem: Decision Making  Goal: Pt/Family able to effectively weigh alternatives and participate in decision making related to treatment and care  Description: INTERVENTIONS:  1. Determine when there are differences between patient's view, family's view, and healthcare provider's view of condition  2. Facilitate patient and family articulation of goals for care  3. Help patient and family identify pros/cons of alternative solutions  4. Provide information as requested by patient/family  5.  Respect patient/family right to receive or not to receive

## 2023-08-13 NOTE — PLAN OF CARE
Problem: Anxiety  Goal: Will report anxiety at manageable levels  Description: INTERVENTIONS:  1. Administer medication as ordered  2. Teach and rehearse alternative coping skills  3. Provide emotional support with 1:1 interaction with staff  8/13/2023 1317 by Lucia Taylor LPN  Outcome: Progressing       Problem: Coping  Goal: Pt/Family able to verbalize concerns and demonstrate effective coping strategies  Description: INTERVENTIONS:  1. Assist patient/family to identify coping skills, available support systems and cultural and spiritual values  2. Provide emotional support, including active listening and acknowledgement of concerns of patient and caregivers  3. Reduce environmental stimuli, as able  4. Instruct patient/family in relaxation techniques, as appropriate  5. Assess for spiritual pain/suffering and initiate Spiritual Care, Psychosocial Clinical Specialist consults as needed  8/13/2023 1317 by Lucia Taylor LPN    Problem: Decision Making  Goal: Pt/Family able to effectively weigh alternatives and participate in decision making related to treatment and care  Description: INTERVENTIONS:  1. Determine when there are differences between patient's view, family's view, and healthcare provider's view of condition  2. Facilitate patient and family articulation of goals for care  3. Help patient and family identify pros/cons of alternative solutions  4. Provide information as requested by patient/family  5. Respect patient/family right to receive or not to receive information  6. Serve as a liaison between patient and family and health care team  7. Initiate Consults from Ethics, Palliative Care or initiate [address] as is appropriate  8/13/2023 1317 by Lucia Taylor LPN  Outcome: Progressing,  Patient friendly on approach, feels she's ready for discharged voiced going to a friends house. Patient medication compliant and attending all therapy groups.  Patient denied depression,suicidal

## 2023-08-13 NOTE — GROUP NOTE
Group Therapy Note    Date: 8/13/2023    Group Start Time: 1100  Group End Time: 7273  Group Topic: Cognitive Skills    Omid Flynn        Group Therapy Note    Attendees: 5/11

## 2023-08-13 NOTE — GROUP NOTE
Group Therapy Note    Date: 8/13/2023    Group Start Time: 0900  Group End Time: 0930  Group Topic: Orientation Group    [facility] Ton Sainz LPN        Group Therapy Note    Attendees: 7/11       Patient's Goal:   Setting goals. Notes:   Patient understood the importance of setting goals. Status After Intervention:  Improved    Participation Level:  Active Listener and Interactive    Participation Quality: Attentive, Sharing, and Supportive      Speech:  normal      Thought Process/Content: Logical      Affective Functioning: Congruent      Mood: anxious      Level of consciousness:  Attentive      Response to Learning: Able to verbalize current knowledge/experience, Able to verbalize/acknowledge new learning, and Capable of insight      Endings: None Reported    Modes of Intervention: Education, Support, and Socialization      Discipline Responsible: Licensed Practical Nurse      Signature:  Marilin Ba LPN

## 2023-08-13 NOTE — GROUP NOTE
Group Therapy Note    Date: 8/12/2023    Group Start Time: 2000  Group End Time: 2030  Group Topic: Relaxation    [facility]    Raif Whitten RN        Group Therapy Note    Attendees: 4    patient refused to attend relaxation group at 8 PM  after encouragement from staff.   1:1 talk time was offered but declined as alternative to group session            Signature:  Rafi Whitten RN

## 2023-08-14 PROCEDURE — 6370000000 HC RX 637 (ALT 250 FOR IP): Performed by: INTERNAL MEDICINE

## 2023-08-14 PROCEDURE — 6370000000 HC RX 637 (ALT 250 FOR IP): Performed by: PSYCHIATRY & NEUROLOGY

## 2023-08-14 PROCEDURE — 1240000000 HC EMOTIONAL WELLNESS R&B

## 2023-08-14 PROCEDURE — 6370000000 HC RX 637 (ALT 250 FOR IP): Performed by: NURSE PRACTITIONER

## 2023-08-14 RX ADMIN — Medication 3 MG: at 20:32

## 2023-08-14 RX ADMIN — APIXABAN 5 MG: 5 TABLET, FILM COATED ORAL at 20:32

## 2023-08-14 RX ADMIN — PANTOPRAZOLE SODIUM 40 MG: 40 TABLET, DELAYED RELEASE ORAL at 06:34

## 2023-08-14 RX ADMIN — APIXABAN 5 MG: 5 TABLET, FILM COATED ORAL at 08:30

## 2023-08-14 RX ADMIN — ARIPIPRAZOLE 20 MG: 20 TABLET ORAL at 20:32

## 2023-08-14 RX ADMIN — MIRTAZAPINE 7.5 MG: 15 TABLET, FILM COATED ORAL at 20:32

## 2023-08-14 NOTE — DISCHARGE INSTR - DIET

## 2023-08-14 NOTE — PLAN OF CARE
Problem: Anxiety  Goal: Will report anxiety at manageable levels  Description: INTERVENTIONS:  1. Administer medication as ordered  2. Teach and rehearse alternative coping skills  3. Provide emotional support with 1:1 interaction with staff  8/14/2023 0910 by Bertha Balbuena RN  Outcome: Progressing     Problem: Coping  Goal: Pt/Family able to verbalize concerns and demonstrate effective coping strategies  Description: INTERVENTIONS:  1. Assist patient/family to identify coping skills, available support systems and cultural and spiritual values  2. Provide emotional support, including active listening and acknowledgement of concerns of patient and caregivers  3. Reduce environmental stimuli, as able  4. Instruct patient/family in relaxation techniques, as appropriate  5. Assess for spiritual pain/suffering and initiate Spiritual Care, Psychosocial Clinical Specialist consults as needed  Outcome: Progressing     Problem: Decision Making  Goal: Pt/Family able to effectively weigh alternatives and participate in decision making related to treatment and care  Description: INTERVENTIONS:  1. Determine when there are differences between patient's view, family's view, and healthcare provider's view of condition  2. Facilitate patient and family articulation of goals for care  3. Help patient and family identify pros/cons of alternative solutions  4. Provide information as requested by patient/family  5. Respect patient/family right to receive or not to receive information  6. Serve as a liaison between patient and family and health care team  7. Initiate Consults from Ethics, Palliative Care or initiate [address] as is appropriate  8/14/2023 0910 by Bertha Balbuena RN  Outcome: Progressing     Problem: Depression/Self Harm  Goal: Effect of psychiatric condition will be minimized and patient will be protected from self harm  Description: INTERVENTIONS:  1.  Assess impact of patient's symptoms on level of

## 2023-08-14 NOTE — GROUP NOTE
Group Therapy Note    Date: 8/14/2023    Group Start Time: 0900  Group End Time: 0930  Group Topic: Community Meeting    [facility] GRECO    Irina Dunbar RN        Group Therapy Note    Attendees: 5/13       Patient's Goal:  Be with family. Discharge planning. Notes:  Goals group    Status After Intervention:  Improved    Participation Level:  Active Listener and Interactive    Participation Quality: Appropriate, Attentive, Sharing, and Supportive      Speech:  normal      Thought Process/Content: Logical  Linear      Affective Functioning: Congruent      Mood: euthymic      Level of consciousness:  Alert, Oriented x4, and Attentive      Response to Learning: Able to retain information and Capable of insight      Endings: None Reported    Modes of Intervention: Support and Socialization      Discipline Responsible: Registered Nurse      Signature:  Irina Dunbar RN

## 2023-08-14 NOTE — DISCHARGE INSTRUCTIONS
Information:  Medications:   Medication summary provided   I understand that I should take only the medications on my list.     -why and when I need to take each medicine.     -which side effects to watch for.     -that I should carry my medication information at all times in case of     Emergency situations. I will take all of my medicines to follow up appointments.     -check with my physician or pharmacist before taking any new    Medication, over the counter product or drink alcohol.    -Ask about food, drug or dietary supplement interactions.    -discard old lists and update records with medication providers. Notify Physician:  Notify physician if you notice:   Always call 911 if you feel your life is in danger  In case of an emergency call 911 immediately! If 911 is not available call your local emergency medical system for help    Behavioral Health Follow Up:  Original Referral Source:[facility]  Discharge Diagnosis: Depression with suicidal ideation [F32. A, R45.851]  Recommendations for Level of Care: continue medication follow up with appointment. Patient status at discharge: stable   My hospital  was: [id number] [facility]   Aftercare plan faxed: yes   -faxed by: staff   -date: 8/15/23   -time: 1400  Prescriptions: too soon to fill per insurance and pharmacy. Smoking: Quit Smoking. Call the [employer]'s smoking quitline at 0-947-15P-QUIT  Know the signs of a heart attack   If you have any of the following symptoms call 911 immediately, do not wait more    Than five minutes. 1. Pressure, fullness and/ or squeezing in the center of the chest spreading to    The jaw, neck or shoulder. 2. Chest discomfort with light headedness, fainting, sweating, nausea or    Shortness of breath. 3. Upper abdominal pressure or discomfort. 4. Lower chest pain, back pain, unusual fatigue, shortness of breath, nausea   Or dizziness.      General Information:   Questions regarding your bill: Call HELP program ([phone])

## 2023-08-14 NOTE — PLAN OF CARE
Problem: Anxiety  Goal: Will report anxiety at manageable levels  Description: INTERVENTIONS:  1. Administer medication as ordered  2. Teach and rehearse alternative coping skills  3. Provide emotional support with 1:1 interaction with staff  8/13/2023 2048 by Broderick Muñoz LPN  Outcome: Progressing  Flowsheets (Taken 8/13/2023 2048)  Will report anxiety at manageable levels:   Administer medication as ordered   Provide emotional support with 1:1 interaction with staff   Teach and rehearse alternative coping skills  Note: Patient remains free from harm to self or others but admits to continued anxiety and depression. Medications available upon request. Staff ensures safety by providing safety checks on the unit intermittently and every 15 minutes. Staff continues to provide a safe environment. Staff encouraged patient to notify if depression continues. Problem: Depression/Self Harm  Goal: Effect of psychiatric condition will be minimized and patient will be protected from self harm  Description: INTERVENTIONS:  1. Assess impact of patient's symptoms on level of functioning, self care needs and offer support as indicated  2. Assess patient/family knowledge of depression, impact on illness and need for teaching  3. Provide emotional support, presence and reassurance  4. Assess for possible suicidal thoughts or ideation. If patient expresses suicidal thoughts or statements do not leave alone, initiate Suicide Precautions, move to a room close to the nursing station and obtain sitter  5.  Initiate consults as appropriate with Mental Health Professional, Spiritual Care, Psychosocial CNS, and consider a recommendation to the [facility] for a Psychiatric Consultation  Outcome: Progressing  Flowsheets (Taken 8/13/2023 2048)  Effect of psychiatric condition will be minimized and patient will be protected from self harm:   Initiate consults as appropriate with Mental Health Professional, Spiritual Care, Psychosocial CNS, and

## 2023-08-14 NOTE — CARE COORDINATION
SW approached pt to confirm discharge plan. Pt states she will discharge to friend Audra's apartment (address [address] [place] [address]. [address], [address]

## 2023-08-14 NOTE — GROUP NOTE
Group Therapy Note    Date: 8/14/2023    Group Start Time: 1100  Group End Time: 9709  Group Topic: Psychoeducation    [facility] PR[facility]ANNA Mcgarry    Group Therapy Note    Attendees: 9/13     Patient's Goal:  Patient will identify ways to improve daily self care, identify goals for health    Notes:  Patient attended group and participated    Status After Intervention:  Improved    Participation Level:  Active Listener and Interactive    Participation Quality: Appropriate, Attentive, Sharing, and Supportive      Speech:  normal      Thought Process/Content: Logical  Linear      Affective Functioning: Congruent      Mood: euthymic      Level of consciousness:  Alert, Oriented x4, and Attentive      Response to Learning: Able to verbalize current knowledge/experience, Able to verbalize/acknowledge new learning, Able to retain information, Able to change behavior, and Progressing to goal      Endings: None Reported    Modes of Intervention: Education, Socialization, Exploration, and Problem-solving      Discipline Responsible: Psychoeducational Specialist      Signature:  Amanda Redding

## 2023-08-14 NOTE — GROUP NOTE
Group Therapy Note    Date: 8/14/2023    Group Start Time: 1330  Group End Time: 5432  Group Topic: Psychoeducation    [facility] BHPRASANNA Moore    Group Therapy Note    Attendees: 7/5     Patient's Goal:  Patient will demonstrate improved interpersonal skills    Notes:  Patient attended group and participated    Status After Intervention:  Improved    Participation Level: Interactive and Monopolizing    Participation Quality: Appropriate, Attentive, and Sharing      Speech:  normal      Thought Process/Content: Logical  Flight of ideas      Affective Functioning: Constricted/Restricted      Mood: euthymic      Level of consciousness:  Alert, Oriented x4, and Attentive      Response to Learning: Able to verbalize current knowledge/experience, Able to verbalize/acknowledge new learning, Able to retain information, Able to change behavior, and Progressing to goal      Endings: None Reported    Modes of Intervention: Education, Support, Socialization, and Exploration      Discipline Responsible: Psychoeducational Specialist      Signature:  Amanda Tan

## 2023-08-14 NOTE — GROUP NOTE
Group Therapy Note    Date: 8/14/2023    Group Start Time: 1000  Group End Time: 1030  Group Topic: Psychoeducation    [facility]    Clark Stauffer RN        Group Therapy Note    Attendees: 5/13       Patient's Goal:  Using distractions to help with stressful situations     Notes:   Active Participant in group     Status After Intervention:  Improved    Participation Level: Interactive    Participation Quality: Appropriate      Speech:  normal      Thought Process/Content: Logical      Affective Functioning: Congruent      Mood: euthymic      Level of consciousness:  Alert      Response to Learning: Able to verbalize current knowledge/experience      Endings: None Reported    Modes of Intervention: Education and Exploration      Discipline Responsible: Registered Nurse      Signature:  Clark Stauffer RN

## 2023-08-15 VITALS
HEART RATE: 103 BPM | TEMPERATURE: 96.9 F | DIASTOLIC BLOOD PRESSURE: 57 MMHG | WEIGHT: 130 LBS | SYSTOLIC BLOOD PRESSURE: 127 MMHG | HEIGHT: 66 IN | RESPIRATION RATE: 14 BRPM | BODY MASS INDEX: 20.89 KG/M2

## 2023-08-15 LAB
EKG ATRIAL RATE: 69 BPM
EKG P AXIS: 56 DEGREES
EKG P-R INTERVAL: 148 MS
EKG Q-T INTERVAL: 358 MS
EKG QRS DURATION: 82 MS
EKG QTC CALCULATION (BAZETT): 383 MS
EKG R AXIS: 75 DEGREES
EKG T AXIS: 60 DEGREES
EKG VENTRICULAR RATE: 69 BPM

## 2023-08-15 PROCEDURE — 6370000000 HC RX 637 (ALT 250 FOR IP): Performed by: INTERNAL MEDICINE

## 2023-08-15 PROCEDURE — 6370000000 HC RX 637 (ALT 250 FOR IP): Performed by: PSYCHIATRY & NEUROLOGY

## 2023-08-15 RX ORDER — MIRTAZAPINE 7.5 MG/1
7.5 TABLET, FILM COATED ORAL NIGHTLY
Qty: 30 TABLET | Refills: 0 | Status: SHIPPED | OUTPATIENT
Start: 2023-08-15

## 2023-08-15 RX ORDER — ARIPIPRAZOLE 20 MG/1
20 TABLET ORAL DAILY
Qty: 30 TABLET | Refills: 0 | Status: SHIPPED | OUTPATIENT
Start: 2023-08-15

## 2023-08-15 RX ADMIN — PANTOPRAZOLE SODIUM 40 MG: 40 TABLET, DELAYED RELEASE ORAL at 08:04

## 2023-08-15 RX ADMIN — APIXABAN 5 MG: 5 TABLET, FILM COATED ORAL at 08:04

## 2023-08-15 ASSESSMENT — PAIN SCALES - GENERAL: PAINLEVEL_OUTOF10: 0

## 2023-08-15 NOTE — PLAN OF CARE
Problem: Anxiety  Goal: Will report anxiety at manageable levels  Description: INTERVENTIONS:  1. Administer medication as ordered  2. Teach and rehearse alternative coping skills  3. Provide emotional support with 1:1 interaction with staff  8/15/2023 1037 by Cierra Zhu RN  Outcome: Progressing     Problem: Coping  Goal: Pt/Family able to verbalize concerns and demonstrate effective coping strategies  Description: INTERVENTIONS:  1. Assist patient/family to identify coping skills, available support systems and cultural and spiritual values  2. Provide emotional support, including active listening and acknowledgement of concerns of patient and caregivers  3. Reduce environmental stimuli, as able  4. Instruct patient/family in relaxation techniques, as appropriate  5. Assess for spiritual pain/suffering and initiate Spiritual Care, Psychosocial Clinical Specialist consults as needed  8/15/2023 1037 by Cierra Zhu RN  Outcome: Progressing     Problem: Decision Making  Goal: Pt/Family able to effectively weigh alternatives and participate in decision making related to treatment and care  Description: INTERVENTIONS:  1. Determine when there are differences between patient's view, family's view, and healthcare provider's view of condition  2. Facilitate patient and family articulation of goals for care  3. Help patient and family identify pros/cons of alternative solutions  4. Provide information as requested by patient/family  5. Respect patient/family right to receive or not to receive information  6. Serve as a liaison between patient and family and health care team  7. Initiate Consults from Ethics, Palliative Care or initiate [address] as is appropriate  8/15/2023 1037 by Cierra Zhu RN  Outcome: Progressing     Problem: Depression/Self Harm  Goal: Effect of psychiatric condition will be minimized and patient will be protected from self harm  Description: INTERVENTIONS:  1.  Assess impact of

## 2023-08-15 NOTE — CARE COORDINATION
Name: Daisy Sebastian    : 2000    Discharge Date: 8/15/23    Primary Auth/Cert #: [id number]    Destination: home     Discharge Medications:      Medication List        CONTINUE taking these medications      apixaban 5 MG Tabs tablet  Commonly known as: Eliquis  Take 1 tablet by mouth 2 times daily  Notes to patient: Blood thinner     ARIPiprazole 20 MG tablet  Commonly known as: ABILIFY  Take 1 tablet by mouth daily  Notes to patient: Mood      ibuprofen 600 MG tablet  Commonly known as: ADVIL;MOTRIN  Notes to patient: Pain      melatonin 3 MG Tabs tablet  Commonly known as: RA Melatonin  Take 1 tablet by mouth daily  Notes to patient: Sleep aid      mirtazapine 7.5 MG tablet  Commonly known as: REMERON  Take 1 tablet by mouth nightly  Notes to patient: Mood      omeprazole 20 MG delayed release capsule  Commonly known as: PRILOSEC  Take 1 capsule by mouth 2 times daily (before meals)  Notes to patient: Acid reducer             STOP taking these medications      clonazePAM 0.5 MG tablet  Commonly known as: Randal Font               Where to Get Your Medications        These medications were sent to [facility] [address] [facility], [address] [place] [id number]      Phone: [phone]   ARIPiprazole 20 MG tablet  mirtazapine 7.5 MG tablet     Pharmacy Instructions:    Medications are too early to fill, should still have some at home per pharmacy.             Follow Up Appointment: [facility] [address]  [phone]    Go on 2023  At 8:15AM

## 2023-08-15 NOTE — PLAN OF CARE
Problem: Anxiety  Goal: Will report anxiety at manageable levels  Description: INTERVENTIONS:  1. Administer medication as ordered  2. Teach and rehearse alternative coping skills  3. Provide emotional support with 1:1 interaction with staff  Outcome: Progressing     Problem: Coping  Goal: Pt/Family able to verbalize concerns and demonstrate effective coping strategies  Description: INTERVENTIONS:  1. Assist patient/family to identify coping skills, available support systems and cultural and spiritual values  2. Provide emotional support, including active listening and acknowledgement of concerns of patient and caregivers  3. Reduce environmental stimuli, as able  4. Instruct patient/family in relaxation techniques, as appropriate  5. Assess for spiritual pain/suffering and initiate Spiritual Care, Psychosocial Clinical Specialist consults as needed  Outcome: Progressing     Problem: Decision Making  Goal: Pt/Family able to effectively weigh alternatives and participate in decision making related to treatment and care  Description: INTERVENTIONS:  1. Determine when there are differences between patient's view, family's view, and healthcare provider's view of condition  2. Facilitate patient and family articulation of goals for care  3. Help patient and family identify pros/cons of alternative solutions  4. Provide information as requested by patient/family  5. Respect patient/family right to receive or not to receive information  6. Serve as a liaison between patient and family and health care team  7. Initiate Consults from Ethics, Palliative Care or initiate [address] as is appropriate  Outcome: Progressing     Problem: Depression/Self Harm  Goal: Effect of psychiatric condition will be minimized and patient will be protected from self harm  Description: INTERVENTIONS:  1. Assess impact of patient's symptoms on level of functioning, self care needs and offer support as indicated  2.  Assess

## 2023-08-15 NOTE — BH NOTE
Patient given tobacco quitline number 20171238522 at this time, refusing to call at this time, states \" I just dont want to quit now\"- patient given information as to the dangers of long term tobacco use. Continue to reinforce the importance of tobacco cessation.

## 2023-08-15 NOTE — GROUP NOTE
Psych-Ed/Relapse Prevention Group Note        Date: August 15, 2023 Start Time: 1:30pm  End Time:  2:15pm      Number of Participants in Group & Unit Census:  5/14    Topic: Leisure skills    Goal of Group:Patient will identify benefits of leisure for coping      Comments:     Patient did not participate in Psych-Ed/Relapse Prevention group, despite staff encouragement and explanation of benefits. Patient remain seclusive to self. Q15 minute safety checks maintained for patient safety and will continue to encourage patient to attend unit programming.          Signature:  Amanda Carr

## 2023-08-15 NOTE — BH NOTE
[facility]  Discharge Note    Pt discharged with followings belongings:   Dental Appliances: None  Vision - Corrective Lenses: None  Hearing Aid: None  Jewelry: None  Body Piercings Removed: No  Clothing: Footwear, Jacket/Coat, Pants, Slippers, Other (Comment) (knitted scarf, 1 pink hoodie, blue vest 2-sleeveless dresses, 1 tank top, 1 skirt, 1pr gray & black pants, 2pr panties)  Other Valuables: Lighter/Matches   Valuables sent home with or returned to patient. Patient educated on aftercare instructions: yes  Information faxed to [facility] by 1600 yes   at 2:57 PM .Patient verbalize understanding of AVS:  .    Status EXAM upon discharge:  Mental Status and Behavioral Exam  Normal: No (anxious)  Level of Assistance: Independent/Self  Facial Expression: Brightened  Affect: Appropriate  Level of Consciousness: Alert  Frequency of Checks: 4 times per hour, close  Mood:Normal: Yes  Mood: Anxious  Motor Activity:Normal: Yes  Motor Activity: Decreased  Eye Contact: Good  Observed Behavior: Cooperative, Friendly, Preoccupied  Sexual Misconduct History: Current - no  Preception: [place] to person, [place] to time, [place] to place, [place] to situation  Attention:Normal: Yes  Attention: Distractible  Thought Processes: Unremarkable  Thought Content:Normal: Yes  Thought Content: Preoccupations  Depression Symptoms: No problems reported or observed. Anxiety Symptoms: Generalized  Katharina Symptoms: No problems reported or observed.   Hallucinations: None  Delusions: No  Delusions: Paranoid  Memory:Normal: Yes  Memory: Poor remote, Confabulation  Insight and Judgment: No  Insight and Judgment: Poor judgment, Poor insight    Tobacco Screening:  Practical Counseling, on admission, rafaela X, if applicable and completed (first 3 are required if patient doesn't refuse):            ( ) Recognizing danger situations (included triggers and roadblocks)                    ( ) Coping skills (new ways to manage stress,relaxation

## 2023-08-15 NOTE — GROUP NOTE
Group Therapy Note    Date: 8/15/2023    Group Start Time: 1100  Group End Time: 6253  Group Topic: Psychoeducation    PRASANNA Graham    Group Therapy Note    Attendees: 9/14     Patient's Goal:  Patient will identify benefits of music for coping and stress management    Notes:  Patient attended group and participated    Status After Intervention:  Improved    Participation Level:  Active Listener and Interactive    Participation Quality: Appropriate, Attentive, Sharing, and Supportive      Speech:  normal      Thought Process/Content: Logical  Linear      Affective Functioning: Constricted/Restricted      Mood: euthymic      Level of consciousness:  Alert and Attentive      Response to Learning: Able to verbalize current knowledge/experience, Able to verbalize/acknowledge new learning, and Able to retain information      Endings: None Reported    Modes of Intervention: Education, Support, Socialization, and Exploration      Discipline Responsible: Psychoeducational Specialist      Signature:  Amanda Youngblood

## 2023-08-15 NOTE — DISCHARGE SUMMARY
Provider Discharge Summary     Patient ID:  Lei Oviedo  [id number]  21 y.o.  2000    Admit date: 8/10/2023    Discharge date and time: 8/15/2023  3:29 PM     Admitting Physician: Gladis Reyna MD     Discharge Physician: Gladis Reyna MD    Admission Diagnoses: Depression with suicidal ideation [F32. Vivien Aures    Discharge Diagnoses:      Severe recurrent major depression with psychotic features [facility])     Patient Active Problem List   Diagnosis Code    Pulmonary embolism, unspecified chronicity, unspecified pulmonary embolism type, unspecified whether acute cor pulmonale present ([address]) I26.99    Depression with suicidal ideation F32. A, R45.851    Major depressive disorder, recurrent, severe with psychotic features ([address]) F33.3    Severe recurrent major depression with psychotic features ([address]) F33.3        Admission Condition: poor    Discharged Condition: stable    Indication for Admission: threat to self    History of Present Illnes (present tense wording is of findings from admission exam and are not necessarily indicative of current findings):   Patient is a 82-year-old female with extensive history of mood disorder with psychosis, PTSD, GIANNI, blood clot currently on Eliquis, intellectual functioning disability admitted for worsening suicidal thoughts and intent to kill self. Also reports hearing commanding voices asking her to kill self. Patient was unable to contract to safety outside of hospital admission that she had an intent to act on these thoughts following which she was admitted to the [facility]. She reports that she has been feeling recently better than several weeks now. Mentions that she was recently kicked out from her friend's house and has been staying with another friend. Because of her last few months she has been couch surfing. Reports that she has been off of her Klonopin and Abilify as she misplaced them.   Reviewed OARRS report her last fill Klonopin for a 10-day supply

## 2023-08-15 NOTE — GROUP NOTE
Group Therapy Note    Date: 8/15/2023    Group Start Time: 1000  Group End Time: 0100  Group Topic: Psychotherapy    [facility]    GRIS Beltran LSW        Group Therapy Note    Attendees: 7/14       Patient's Goal:  Increase interpersonal relationship skills utilizing talk therapy while discussing positive coping skills for Depression. Status After Intervention:  Improved    Participation Level:  Active Listener and Interactive    Participation Quality: Appropriate, Attentive, and Sharing      Speech:  normal      Thought Process/Content: Logical      Affective Functioning: Congruent      Mood: euthymic      Level of consciousness:  Alert, Oriented x4, and Attentive      Response to Learning: Able to verbalize current knowledge/experience and Able to verbalize/acknowledge new learning      Endings: None Reported    Modes of Intervention: Education, Support, and Socialization      Discipline Responsible: /Counselor      Signature:  GRIS Beltran LSW

## 2023-09-07 ENCOUNTER — HOSPITAL ENCOUNTER (EMERGENCY)
Age: 23
Discharge: HOME | End: 2023-09-07
Attending: EMERGENCY MEDICINE
Payer: COMMERCIAL

## 2023-09-07 VITALS
TEMPERATURE: 97.7 F | HEART RATE: 53 BPM | SYSTOLIC BLOOD PRESSURE: 108 MMHG | DIASTOLIC BLOOD PRESSURE: 69 MMHG | WEIGHT: 142.64 LBS | RESPIRATION RATE: 18 BRPM | OXYGEN SATURATION: 99 % | BODY MASS INDEX: 23.02 KG/M2

## 2023-09-07 DIAGNOSIS — N30.01 ACUTE CYSTITIS WITH HEMATURIA: Primary | ICD-10-CM

## 2023-09-07 LAB
ANION GAP SERPL CALCULATED.3IONS-SCNC: 10 MMOL/L (ref 9–17)
BACTERIA URNS QL MICRO: ABNORMAL
BASOPHILS # BLD: <0.03 K/UL (ref 0–0.2)
BASOPHILS NFR BLD: 0 % (ref 0–2)
BILIRUB UR QL STRIP: NEGATIVE
BUN SERPL-MCNC: 9 MG/DL (ref 6–20)
CALCIUM SERPL-MCNC: 11.5 MG/DL (ref 8.6–10.4)
CASTS #/AREA URNS LPF: ABNORMAL /LPF (ref 0–8)
CHLORIDE SERPL-SCNC: 103 MMOL/L (ref 98–107)
CLARITY UR: ABNORMAL
CO2 SERPL-SCNC: 24 MMOL/L (ref 20–31)
COLOR UR: YELLOW
CREAT SERPL-MCNC: 0.6 MG/DL (ref 0.5–0.9)
EOSINOPHIL # BLD: 0.03 K/UL (ref 0–0.44)
EOSINOPHILS RELATIVE PERCENT: 0 % (ref 1–4)
EPI CELLS #/AREA URNS HPF: ABNORMAL /HPF (ref 0–5)
ERYTHROCYTE [DISTWIDTH] IN BLOOD BY AUTOMATED COUNT: 13 % (ref 11.8–14.4)
GFR SERPL CREATININE-BSD FRML MDRD: >60 ML/MIN/1.73M2
GLUCOSE SERPL-MCNC: 105 MG/DL (ref 70–99)
GLUCOSE UR STRIP-MCNC: NEGATIVE MG/DL
HCG SERPL QL: NEGATIVE
HCT VFR BLD AUTO: 42.2 % (ref 36.3–47.1)
HGB BLD-MCNC: 13.6 G/DL (ref 11.9–15.1)
HGB UR QL STRIP.AUTO: ABNORMAL
IMM GRANULOCYTES # BLD AUTO: <0.03 K/UL (ref 0–0.3)
IMM GRANULOCYTES NFR BLD: 0 %
KETONES UR STRIP-MCNC: ABNORMAL MG/DL
LEUKOCYTE ESTERASE UR QL STRIP: ABNORMAL
LYMPHOCYTES NFR BLD: 1.94 K/UL (ref 1.2–5.2)
LYMPHOCYTES RELATIVE PERCENT: 27 % (ref 25–45)
MCH RBC QN AUTO: 27.1 PG (ref 25.2–33.5)
MCHC RBC AUTO-ENTMCNC: 32.2 G/DL (ref 28.4–34.8)
MCV RBC AUTO: 84.1 FL (ref 82.6–102.9)
MONOCYTES NFR BLD: 0.4 K/UL (ref 0.1–1.4)
MONOCYTES NFR BLD: 6 % (ref 2–8)
NEUTROPHILS NFR BLD: 67 % (ref 34–64)
NEUTS SEG NFR BLD: 4.77 K/UL (ref 1.8–8)
NITRITE UR QL STRIP: NEGATIVE
NRBC BLD-RTO: 0 PER 100 WBC
PH UR STRIP: 6 [PH] (ref 5–8)
PLATELET # BLD AUTO: 292 K/UL (ref 138–453)
PMV BLD AUTO: 9.8 FL (ref 8.1–13.5)
POTASSIUM SERPL-SCNC: 4 MMOL/L (ref 3.7–5.3)
PROT UR STRIP-MCNC: ABNORMAL MG/DL
RBC # BLD AUTO: 5.02 M/UL (ref 3.95–5.11)
RBC #/AREA URNS HPF: ABNORMAL /HPF (ref 0–4)
SARS-COV-2 RDRP RESP QL NAA+PROBE: NOT DETECTED
SODIUM SERPL-SCNC: 137 MMOL/L (ref 135–144)
SP GR UR STRIP: 1.03 (ref 1–1.03)
SPECIMEN DESCRIPTION: NORMAL
UROBILINOGEN UR STRIP-ACNC: NORMAL EU/DL (ref 0–1)
WBC #/AREA URNS HPF: ABNORMAL /HPF (ref 0–5)
WBC OTHER # BLD: 7.2 K/UL (ref 4.5–13.5)

## 2023-09-07 PROCEDURE — 93005 ELECTROCARDIOGRAM TRACING: CPT | Performed by: STUDENT IN AN ORGANIZED HEALTH CARE EDUCATION/TRAINING PROGRAM

## 2023-09-07 PROCEDURE — 81001 URINALYSIS AUTO W/SCOPE: CPT

## 2023-09-07 PROCEDURE — 2580000003 HC RX 258: Performed by: STUDENT IN AN ORGANIZED HEALTH CARE EDUCATION/TRAINING PROGRAM

## 2023-09-07 PROCEDURE — 6370000000 HC RX 637 (ALT 250 FOR IP): Performed by: STUDENT IN AN ORGANIZED HEALTH CARE EDUCATION/TRAINING PROGRAM

## 2023-09-07 PROCEDURE — 85025 COMPLETE CBC W/AUTO DIFF WBC: CPT

## 2023-09-07 PROCEDURE — 96361 HYDRATE IV INFUSION ADD-ON: CPT

## 2023-09-07 PROCEDURE — 87086 URINE CULTURE/COLONY COUNT: CPT

## 2023-09-07 PROCEDURE — 99284 EMERGENCY DEPT VISIT MOD MDM: CPT

## 2023-09-07 PROCEDURE — 96374 THER/PROPH/DIAG INJ IV PUSH: CPT

## 2023-09-07 PROCEDURE — 87635 SARS-COV-2 COVID-19 AMP PRB: CPT

## 2023-09-07 PROCEDURE — 84703 CHORIONIC GONADOTROPIN ASSAY: CPT

## 2023-09-07 PROCEDURE — 80048 BASIC METABOLIC PNL TOTAL CA: CPT

## 2023-09-07 PROCEDURE — 6360000002 HC RX W HCPCS: Performed by: STUDENT IN AN ORGANIZED HEALTH CARE EDUCATION/TRAINING PROGRAM

## 2023-09-07 RX ORDER — CEPHALEXIN 500 MG/1
500 CAPSULE ORAL 2 TIMES DAILY
Qty: 14 CAPSULE | Refills: 0 | Status: SHIPPED | OUTPATIENT
Start: 2023-09-07 | End: 2023-09-14

## 2023-09-07 RX ORDER — 0.9 % SODIUM CHLORIDE 0.9 %
1000 INTRAVENOUS SOLUTION INTRAVENOUS ONCE
Status: COMPLETED | OUTPATIENT
Start: 2023-09-07 | End: 2023-09-07

## 2023-09-07 RX ORDER — IBUPROFEN 400 MG/1
400 TABLET ORAL ONCE
Status: COMPLETED | OUTPATIENT
Start: 2023-09-07 | End: 2023-09-07

## 2023-09-07 RX ORDER — ONDANSETRON 2 MG/ML
4 INJECTION INTRAMUSCULAR; INTRAVENOUS ONCE
Status: COMPLETED | OUTPATIENT
Start: 2023-09-07 | End: 2023-09-07

## 2023-09-07 RX ORDER — CEPHALEXIN 500 MG/1
500 CAPSULE ORAL ONCE
Status: COMPLETED | OUTPATIENT
Start: 2023-09-07 | End: 2023-09-07

## 2023-09-07 RX ADMIN — CEPHALEXIN 500 MG: 500 CAPSULE ORAL at 11:10

## 2023-09-07 RX ADMIN — SODIUM CHLORIDE 1000 ML: 9 INJECTION, SOLUTION INTRAVENOUS at 09:40

## 2023-09-07 RX ADMIN — ONDANSETRON 4 MG: 2 INJECTION INTRAMUSCULAR; INTRAVENOUS at 09:42

## 2023-09-07 RX ADMIN — IBUPROFEN 400 MG: 400 TABLET, FILM COATED ORAL at 11:10

## 2023-09-07 ASSESSMENT — PAIN - FUNCTIONAL ASSESSMENT: PAIN_FUNCTIONAL_ASSESSMENT: NONE - DENIES PAIN

## 2023-09-07 NOTE — ED PROVIDER NOTES
[address] [place] [address] ED  Emergency Department Encounter  Emergency Medicine Resident     Pt Liudmila Guzmán  MRN: [id number]  [address] [facility] 2000  Date of evaluation: 9/7/23  PCP:  No primary care provider on file. Note Started: 9:21 AM EDT      CHIEF COMPLAINT       Chief Complaint   Patient presents with    Dizziness    Nausea       HISTORY OF PRESENT ILLNESS  (Location/Symptom, Timing/Onset, Context/Setting, Quality, Duration, Modifying Factors, Severity.)      Marilee Perea is a 23 y.o. female who presents with dizziness, lightheadedness that began last Saturday. Does not acute in onset. She states that she notices it more positional or when she stands up, does not resolve at rest however does improve. Endorses some low-grade headaches as well. Lower abdominal pain, nausea but no vomiting. Last menstrual period was in August.    PAST MEDICAL / SURGICAL / SOCIAL / FAMILY HISTORY      has a past medical history of Anxiety, Bipolar 1 disorder ([address]), Heart murmur, PTSD (post-traumatic stress disorder), and Thyroid disease. has no past surgical history on file.       Social History     Socioeconomic History    Marital status: Single     Spouse name: Not on file    Number of children: Not on file    Years of education: Not on file    Highest education level: Not on file   Occupational History    Not on file   Tobacco Use    Smoking status: Never    Smokeless tobacco: Never   Substance and Sexual Activity    Alcohol use: Not Currently    Drug use: Not Currently    Sexual activity: Yes     Partners: Male   Other Topics Concern    Not on file   Social History Narrative    Not on file     Social Determinants of Health     Financial Resource Strain: Not on file   Food Insecurity: Not on file   Transportation Needs: Not on file   Physical Activity: Not on file   Stress: Not on file   Social Connections: Not on file   Intimate Partner Violence: Not on file   Housing Stability: Not on file

## 2023-09-07 NOTE — DISCHARGE INSTRUCTIONS
You were found to have a urinary tract infection. You are given 1 dose of antibiotic here in the emergency department. It is important that you get the antibiotic filled and take for the full course as prescribed. Sure to drink plenty of water while taking this antibiotic and avoid alcohol. Return to the emergency department if you develop worsening symptoms, worsening dizziness, difficulty walking, worsening abdominal pain, persistent nausea and vomiting, chest pain or shortness of breath.

## 2023-09-08 LAB
EKG ATRIAL RATE: 84 BPM
EKG P AXIS: 50 DEGREES
EKG P-R INTERVAL: 154 MS
EKG Q-T INTERVAL: 350 MS
EKG QRS DURATION: 78 MS
EKG QTC CALCULATION (BAZETT): 413 MS
EKG R AXIS: 69 DEGREES
EKG T AXIS: 37 DEGREES
EKG VENTRICULAR RATE: 84 BPM
MICROORGANISM SPEC CULT: ABNORMAL
MICROORGANISM SPEC CULT: ABNORMAL
SPECIMEN DESCRIPTION: ABNORMAL

## 2023-09-09 ASSESSMENT — ENCOUNTER SYMPTOMS
CHEST TIGHTNESS: 0
SHORTNESS OF BREATH: 0
ABDOMINAL PAIN: 1
VOMITING: 0
NAUSEA: 1
DIARRHEA: 0
BLOOD IN STOOL: 0
ABDOMINAL DISTENTION: 0

## 2023-09-11 NOTE — PROGRESS NOTES
Reviewed patient's urine culture - culture positive for Group B beta hemolytic streptococci. Patient was discharged on Cephalexin. Antibiotic prescribed at discharge is appropriate - no changes made to antibiotic regimen.      Summer Barrera, PharmD  PGY2 Pharmacy Resident 9/11/2023 3:06 PM

## 2023-09-16 ENCOUNTER — HOSPITAL ENCOUNTER (EMERGENCY)
Age: 23
Discharge: HOME | End: 2023-09-16
Attending: EMERGENCY MEDICINE
Payer: COMMERCIAL

## 2023-09-16 ENCOUNTER — APPOINTMENT (OUTPATIENT)
Dept: CT IMAGING | Age: 23
End: 2023-09-16
Payer: COMMERCIAL

## 2023-09-16 VITALS
DIASTOLIC BLOOD PRESSURE: 78 MMHG | BODY MASS INDEX: 23.48 KG/M2 | HEART RATE: 69 BPM | OXYGEN SATURATION: 98 % | RESPIRATION RATE: 12 BRPM | TEMPERATURE: 97.4 F | WEIGHT: 145.5 LBS | SYSTOLIC BLOOD PRESSURE: 112 MMHG

## 2023-09-16 DIAGNOSIS — R07.9 CHEST PAIN, UNSPECIFIED TYPE: Primary | ICD-10-CM

## 2023-09-16 LAB
ALBUMIN SERPL-MCNC: 4.9 G/DL (ref 3.5–5.2)
ALBUMIN/GLOB SERPL: 1.5 {RATIO} (ref 1–2.5)
ALP SERPL-CCNC: 89 U/L (ref 35–104)
ALT SERPL-CCNC: 15 U/L (ref 5–33)
ANION GAP SERPL CALCULATED.3IONS-SCNC: 13 MMOL/L (ref 9–17)
AST SERPL-CCNC: 20 U/L
BASOPHILS # BLD: 0.03 K/UL (ref 0–0.2)
BASOPHILS NFR BLD: 0 % (ref 0–2)
BILIRUB SERPL-MCNC: 0.4 MG/DL (ref 0.3–1.2)
BNP SERPL-MCNC: <36 PG/ML
BUN SERPL-MCNC: 14 MG/DL (ref 6–20)
CALCIUM SERPL-MCNC: 10.8 MG/DL (ref 8.6–10.4)
CHLORIDE SERPL-SCNC: 100 MMOL/L (ref 98–107)
CO2 SERPL-SCNC: 25 MMOL/L (ref 20–31)
CREAT SERPL-MCNC: 0.7 MG/DL (ref 0.5–0.9)
EOSINOPHIL # BLD: 0.06 K/UL (ref 0–0.44)
EOSINOPHILS RELATIVE PERCENT: 1 % (ref 1–4)
ERYTHROCYTE [DISTWIDTH] IN BLOOD BY AUTOMATED COUNT: 12.9 % (ref 11.8–14.4)
GFR SERPL CREATININE-BSD FRML MDRD: >60 ML/MIN/1.73M2
GLUCOSE SERPL-MCNC: 83 MG/DL (ref 70–99)
HCG SERPL QL: NEGATIVE
HCT VFR BLD AUTO: 39.5 % (ref 36.3–47.1)
HGB BLD-MCNC: 12.7 G/DL (ref 11.9–15.1)
IMM GRANULOCYTES # BLD AUTO: <0.03 K/UL (ref 0–0.3)
IMM GRANULOCYTES NFR BLD: 0 %
LYMPHOCYTES NFR BLD: 3.21 K/UL (ref 1.2–5.2)
LYMPHOCYTES RELATIVE PERCENT: 37 % (ref 25–45)
MCH RBC QN AUTO: 27.7 PG (ref 25.2–33.5)
MCHC RBC AUTO-ENTMCNC: 32.2 G/DL (ref 28.4–34.8)
MCV RBC AUTO: 86.1 FL (ref 82.6–102.9)
MONOCYTES NFR BLD: 0.66 K/UL (ref 0.1–1.4)
MONOCYTES NFR BLD: 8 % (ref 2–8)
NEUTROPHILS NFR BLD: 54 % (ref 34–64)
NEUTS SEG NFR BLD: 4.71 K/UL (ref 1.8–8)
NRBC BLD-RTO: 0 PER 100 WBC
PLATELET # BLD AUTO: 300 K/UL (ref 138–453)
PMV BLD AUTO: 10.4 FL (ref 8.1–13.5)
POTASSIUM SERPL-SCNC: 3.7 MMOL/L (ref 3.7–5.3)
PROT SERPL-MCNC: 8.2 G/DL (ref 6.4–8.3)
RBC # BLD AUTO: 4.59 M/UL (ref 3.95–5.11)
SODIUM SERPL-SCNC: 138 MMOL/L (ref 135–144)
TROPONIN I SERPL HS-MCNC: <6 NG/L (ref 0–14)
WBC OTHER # BLD: 8.7 K/UL (ref 4.5–13.5)

## 2023-09-16 PROCEDURE — 84703 CHORIONIC GONADOTROPIN ASSAY: CPT

## 2023-09-16 PROCEDURE — 99285 EMERGENCY DEPT VISIT HI MDM: CPT

## 2023-09-16 PROCEDURE — 83880 ASSAY OF NATRIURETIC PEPTIDE: CPT

## 2023-09-16 PROCEDURE — 6360000004 HC RX CONTRAST MEDICATION: Performed by: STUDENT IN AN ORGANIZED HEALTH CARE EDUCATION/TRAINING PROGRAM

## 2023-09-16 PROCEDURE — 80053 COMPREHEN METABOLIC PANEL: CPT

## 2023-09-16 PROCEDURE — 85025 COMPLETE CBC W/AUTO DIFF WBC: CPT

## 2023-09-16 PROCEDURE — 84484 ASSAY OF TROPONIN QUANT: CPT

## 2023-09-16 PROCEDURE — 93005 ELECTROCARDIOGRAM TRACING: CPT | Performed by: STUDENT IN AN ORGANIZED HEALTH CARE EDUCATION/TRAINING PROGRAM

## 2023-09-16 PROCEDURE — 71260 CT THORAX DX C+: CPT

## 2023-09-16 RX ADMIN — IOPAMIDOL 75 ML: 755 INJECTION, SOLUTION INTRAVENOUS at 21:21

## 2023-09-16 ASSESSMENT — PAIN - FUNCTIONAL ASSESSMENT: PAIN_FUNCTIONAL_ASSESSMENT: 0-10

## 2023-09-16 ASSESSMENT — PAIN SCALES - GENERAL: PAINLEVEL_OUTOF10: 10

## 2023-09-16 ASSESSMENT — PAIN DESCRIPTION - LOCATION: LOCATION: CHEST

## 2023-09-16 ASSESSMENT — PAIN DESCRIPTION - ORIENTATION: ORIENTATION: MID

## 2023-09-16 ASSESSMENT — HEART SCORE: ECG: 0

## 2023-09-17 NOTE — ED PROVIDER NOTES
[facility]  Emergency Department  Emergency Medicine Resident Sign-out     Care of Syed Horan was assumed from Dr. Dana Bridges and is being seen for Chest Pain  . The patient's initial evaluation and plan have been discussed with the prior provider who initially evaluated the patient.      EMERGENCY DEPARTMENT COURSE / MEDICAL DECISION MAKING:       MEDICATIONS GIVEN:  Orders Placed This Encounter   Medications    iopamidol (ISOVUE-370) 76 % injection 75 mL       LABS / RADIOLOGY:     Results for orders placed or performed during the hospital encounter of 09/16/23   CBC with Auto Differential   Result Value Ref Range    WBC 8.7 4.5 - 13.5 k/uL    RBC 4.59 3.95 - 5.11 m/uL    Hemoglobin 12.7 11.9 - 15.1 g/dL    Hematocrit 39.5 36.3 - 47.1 %    MCV 86.1 82.6 - 102.9 fL    MCH 27.7 25.2 - 33.5 pg    MCHC 32.2 28.4 - 34.8 g/dL    RDW 12.9 11.8 - 14.4 %    Platelets 762 793 - 484 k/uL    MPV 10.4 8.1 - 13.5 fL    NRBC Automated 0.0 0.0 per 100 WBC    Neutrophils % 54 34 - 64 %    Lymphocytes % 37 25 - 45 %    Monocytes % 8 2 - 8 %    Eosinophils % 1 1 - 4 %    Basophils % 0 0 - 2 %    Immature Granulocytes 0 0 %    Neutrophils Absolute 4.71 1.80 - 8.00 k/uL    Lymphocytes Absolute 3.21 1.20 - 5.20 k/uL    Monocytes Absolute 0.66 0.10 - 1.40 k/uL    Eosinophils Absolute 0.06 0.00 - 0.44 k/uL    Basophils Absolute 0.03 0.00 - 0.20 k/uL    Absolute Immature Granulocyte <0.03 0.00 - 0.30 k/uL   Comprehensive Metabolic Panel   Result Value Ref Range    Sodium 138 135 - 144 mmol/L    Potassium 3.7 3.7 - 5.3 mmol/L    Chloride 100 98 - 107 mmol/L    CO2 25 20 - 31 mmol/L    Anion Gap 13 9 - 17 mmol/L    Glucose 83 70 - 99 mg/dL    BUN 14 6 - 20 mg/dL    Creatinine 0.7 0.5 - 0.9 mg/dL    Est, Glom Filt Rate >60 >60 mL/min/1.73m2    Calcium 10.8 (H) 8.6 - 10.4 mg/dL    Total Protein 8.2 6.4 - 8.3 g/dL    Albumin 4.9 3.5 - 5.2 g/dL    Albumin/Globulin Ratio 1.5 1.0 - 2.5    Total Bilirubin 0.4 0.3 - 1.2

## 2023-09-17 NOTE — ED PROVIDER NOTES
[facility] ED  Emergency Department Encounter  Emergency Medicine Resident     Pt Marcelo Middleton  MRN: [id number]  [address] [facility] 2000  Date of evaluation: 9/16/23  PCP:  No primary care provider on file. Note Started: 8:50 PM EDT      CHIEF COMPLAINT       Chief Complaint   Patient presents with    Chest Pain       HISTORY OF PRESENT ILLNESS  (Location/Symptom, Timing/Onset, Context/Setting, Quality, Duration, Modifying Factors, Severity.)      Gisselle Reeves is a 23 y.o. female who presents with chest pain. Patient has a history of pulmonary embolism, she is supposed be on Eliquis, she states that she has not been taking her Eliquis because she has been unable to obtain the medication. Patient is resting comfortably at bedside, she is not complaining of any trouble breathing, abdominal pain or shortness of breath. No fevers chills nausea vomiting. Patient additionally has a history of bipolar disorder, PTSD, anxiety. PAST MEDICAL / SURGICAL / SOCIAL / FAMILY HISTORY      has a past medical history of Anxiety, Bipolar 1 disorder ([address]), Heart murmur, PTSD (post-traumatic stress disorder), and Thyroid disease. has no past surgical history on file.       Social History     Socioeconomic History    Marital status: Single     Spouse name: Not on file    Number of children: Not on file    Years of education: Not on file    Highest education level: Not on file   Occupational History    Not on file   Tobacco Use    Smoking status: Never    Smokeless tobacco: Never   Substance and Sexual Activity    Alcohol use: Not Currently    Drug use: Not Currently    Sexual activity: Yes     Partners: Male   Other Topics Concern    Not on file   Social History Narrative    Not on file     Social Determinants of Health     Financial Resource Strain: Not on file   Food Insecurity: Not on file   Transportation Needs: Not on file   Physical Activity: Not on file   Stress: Not on file   Social

## 2023-09-17 NOTE — ED NOTES
Pt. Presens to the ED for chest pain and sob. Pt states that this pain started this afternoon while she was laying down. Pt states that she has a heart murmur, but denies other cardiac hx. Pt resp even and non labored. Pt has a ry cough that seems to aggravate her mid sternal chest pain that is reproducible by palpation. Pt placed on full cardiac monitor. EKG completed in triage. Line and labs obtained. Will continue with plan of care.      Tequila Qureshi RN  09/16/23 2008

## 2023-09-18 LAB
EKG ATRIAL RATE: 73 BPM
EKG P AXIS: 41 DEGREES
EKG P-R INTERVAL: 160 MS
EKG Q-T INTERVAL: 362 MS
EKG QRS DURATION: 80 MS
EKG QTC CALCULATION (BAZETT): 398 MS
EKG R AXIS: 73 DEGREES
EKG T AXIS: 55 DEGREES
EKG VENTRICULAR RATE: 73 BPM

## 2023-09-18 PROCEDURE — 93010 ELECTROCARDIOGRAM REPORT: CPT | Performed by: INTERNAL MEDICINE

## 2023-09-22 ENCOUNTER — NURSE TRIAGE (OUTPATIENT)
Dept: OTHER | Facility: CLINIC | Age: 23
End: 2023-09-22

## 2023-09-22 NOTE — TELEPHONE ENCOUNTER
Location of patient: [address] call from [place] at [facility]; Patient with [employer] Complaint requesting to establish care with [address]. Subjective: Caller states \"I have already been to the ED\"     Current Symptoms: chest pain (above the breasts in the middle)  no shortness of air, mild nausea, mild bilateral shoulder pain    Onset: has had this problem since she was a teenager     Associated Symptoms: NA    Pain Severity: 9/10; sharp and burning; constant    Temperature: denies       What has been tried: Tylenol    LMP: NA Pregnant: Unknown    Recommended disposition: Go to ED Now (patient may or may not go in to the ED - patient advised of possible risk even though natalie visits to the ED has ruled out cardiac/pulm issues - per patient)    Care advice provided, patient verbalizes understanding; denies any other questions or concerns; instructed to call back for any new or worsening symptoms. Patient/Caller agrees with recommended disposition; writer provided warm transfer to [place] at [facility] for appointment scheduling for a new patient appt    Attention Provider: Thank you for allowing me to participate in the care of your patient. The patient was connected to triage in response to information provided to the [facility]. Please do not respond through this encounter as the response is not directed to a shared pool.       Reason for Disposition   History of prior 'blood clot' in leg or lungs (i.e., deep vein thrombosis, pulmonary embolism)    Protocols used: Chest Pain-ADULT-[place]

## 2023-10-12 ENCOUNTER — OFFICE VISIT (OUTPATIENT)
Dept: FAMILY MEDICINE CLINIC | Age: 23
End: 2023-10-12
Payer: COMMERCIAL

## 2023-10-12 VITALS
SYSTOLIC BLOOD PRESSURE: 112 MMHG | TEMPERATURE: 97.9 F | WEIGHT: 141 LBS | DIASTOLIC BLOOD PRESSURE: 69 MMHG | BODY MASS INDEX: 22.76 KG/M2 | HEART RATE: 88 BPM

## 2023-10-12 DIAGNOSIS — F41.9 ANXIETY: ICD-10-CM

## 2023-10-12 DIAGNOSIS — R01.1 HEART MURMUR: Primary | ICD-10-CM

## 2023-10-12 DIAGNOSIS — I26.99 PULMONARY EMBOLISM, UNSPECIFIED CHRONICITY, UNSPECIFIED PULMONARY EMBOLISM TYPE, UNSPECIFIED WHETHER ACUTE COR PULMONALE PRESENT (HCC): ICD-10-CM

## 2023-10-12 DIAGNOSIS — Z23 FLU VACCINE NEED: ICD-10-CM

## 2023-10-12 DIAGNOSIS — Z59.00 HOMELESSNESS: ICD-10-CM

## 2023-10-12 PROCEDURE — G8427 DOCREV CUR MEDS BY ELIG CLIN: HCPCS

## 2023-10-12 PROCEDURE — G8420 CALC BMI NORM PARAMETERS: HCPCS

## 2023-10-12 PROCEDURE — 1036F TOBACCO NON-USER: CPT

## 2023-10-12 PROCEDURE — 90686 IIV4 VACC NO PRSV 0.5 ML IM: CPT | Performed by: FAMILY MEDICINE

## 2023-10-12 PROCEDURE — 99203 OFFICE O/P NEW LOW 30 MIN: CPT

## 2023-10-12 PROCEDURE — G8482 FLU IMMUNIZE ORDER/ADMIN: HCPCS

## 2023-10-12 RX ORDER — HYDROXYZINE HYDROCHLORIDE 25 MG/1
25 TABLET, FILM COATED ORAL NIGHTLY
Qty: 30 TABLET | Refills: 0 | Status: SHIPPED | OUTPATIENT
Start: 2023-10-12 | End: 2023-11-11

## 2023-10-12 SDOH — ECONOMIC STABILITY - HOUSING INSECURITY: HOMELESSNESS UNSPECIFIED: Z59.00

## 2023-10-12 ASSESSMENT — ENCOUNTER SYMPTOMS
RESPIRATORY NEGATIVE: 1
GASTROINTESTINAL NEGATIVE: 1

## 2023-10-12 NOTE — PROGRESS NOTES
[facility]    Family Medicine Residency Program - Outpatient Note      Subjective:    Dwayne Sanders is a 26 y.o. female with  has a past medical history of Anxiety, Bipolar 1 disorder ([address]), Heart murmur, PTSD (post-traumatic stress disorder), and Thyroid disease. Presented to the office today for:  Chief Complaint   Patient presents with    Check-Up     Patient states she is doing well    Anxiety     Patient is requesting medication for anxiety        HPI  New patient 29-year-old female is here to establish care      -Pulmonary embolism diagnosed on 7/28/2022  -On Eliquis 5 Mg twice daily  -Reports being compliant with the medication  -Patient reports it was due to her birth control  -And is no longer on the birth control    -Patient is currently homeless  -With provide referral SDOH    -MDD with psychotic features  -Patient is established with a psychiatrist -Sharifa Fischer at [address] [place] [address]  - missed her recent appointment in September, reports she will follow-up with them soon  -During the visit today patient was found to be pleasant, seemed a bit anxious but making eye contact, answering questions appropriately, not disoriented or agitated. Does not seem to be in active psychosis.   -Denies suicidal or homicidal ideation    -Anxiety  -Offered Atarax to which patient agrees    -Heart murmur  -We will order echo today and if needed probable cardiology referral in the future    -History of ankle fracture  She reports history of left ankle fracture 4 months ago, for she was supposed to wear the boot for 6 to 8 weeks but she took it off after 4 weeks.  -She reports pain with walking and movements  -We will discuss getting updated imaging at next visit      -Flu vaccine administered today  -Patient denies drinking, smoking    -Patient reports her step-mom was diagnosed with breast cancer and her father and and she are thinking about getting a mammogram  Counseled patient

## 2023-10-12 NOTE — PROGRESS NOTES
Visit Information    Have you changed or started any medications since your last visit including any over-the-counter medicines, vitamins, or herbal medicines? no   Have you stopped taking any of your medications? Is so, why? -  no  Are you having any side effects from any of your medications? - no    Have you seen any other physician or provider since your last visit?  no   Have you had any other diagnostic tests since your last visit?  no   Have you been seen in the emergency room and/or had an admission in a hospital since we last saw you?  no   Have you had your routine dental cleaning in the past 6 months?  no     Do you have an active MyChart account? If no, what is the barrier?   No:     No care team member to display    Medical History Review  Past Medical, Family, and Social History reviewed and does not contribute to the patient presenting condition    Health Maintenance   Topic Date Due    Hepatitis B vaccine (1 of 3 - 3-dose series) Never done    COVID-19 Vaccine (1) Never done    Varicella vaccine (1 of 2 - 2-dose childhood series) Never done    HPV vaccine (1 - 2-dose series) Never done    Depression Monitoring  Never done    HIV screen  Never done    Chlamydia/GC screen  Never done    Hepatitis C screen  Never done    DTaP/Tdap/Td vaccine (1 - Tdap) Never done    Flu vaccine (1) Never done    Pap smear  Never done    Hepatitis A vaccine  Aged Out    Hib vaccine  Aged Out    Meningococcal (ACWY) vaccine  Aged Out    Pneumococcal 0-64 years Vaccine  Aged Out

## 2023-10-23 ENCOUNTER — TELEPHONE (OUTPATIENT)
Dept: FAMILY MEDICINE CLINIC | Age: 23
End: 2023-10-23

## 2023-10-23 NOTE — TELEPHONE ENCOUNTER
Jorge Harris was contacted by mario Capellan for follow-up regarding SDOH related needs. Writer spoke with: Patient    Progress Notes: Writer spoke with patient and she declined services at this time. Patient stated she has a residence and already knows about food pantries.  Patient advised to contact pcp if she needs assistance in the future    Action steps to be completed by writer: Close referral.    Action steps to be completed by patient: Close referral.    Patient has given verbal permission to leave detailed messages regarding SDOH referral on their phone: N/A    Patient has given verbal permission to submit applications on their behalf: N/A    April Bryan [place], [place]

## 2023-10-30 ENCOUNTER — TELEPHONE (OUTPATIENT)
Dept: FAMILY MEDICINE CLINIC | Age: 23
End: 2023-10-30

## 2023-11-08 ENCOUNTER — TELEPHONE (OUTPATIENT)
Dept: FAMILY MEDICINE CLINIC | Age: 23
End: 2023-11-08

## 2023-11-08 NOTE — TELEPHONE ENCOUNTER
Person from [facility] called stating the Echo Complete was denied and is requesting a peer to peer.    Phone: [phone]  Case Number for Denial: [id number]  Procedure: Echo Complete

## 2023-11-12 ENCOUNTER — HOSPITAL ENCOUNTER (EMERGENCY)
Age: 23
Discharge: HOME | End: 2023-11-12
Attending: EMERGENCY MEDICINE
Payer: COMMERCIAL

## 2023-11-12 VITALS
RESPIRATION RATE: 16 BRPM | HEART RATE: 91 BPM | SYSTOLIC BLOOD PRESSURE: 117 MMHG | TEMPERATURE: 97.5 F | BODY MASS INDEX: 22.68 KG/M2 | HEIGHT: 66 IN | WEIGHT: 141.09 LBS | DIASTOLIC BLOOD PRESSURE: 68 MMHG | OXYGEN SATURATION: 99 %

## 2023-11-12 DIAGNOSIS — F33.3 MAJOR DEPRESSIVE DISORDER, RECURRENT, SEVERE WITH PSYCHOTIC FEATURES (HCC): ICD-10-CM

## 2023-11-12 DIAGNOSIS — I26.99 PULMONARY EMBOLISM, UNSPECIFIED CHRONICITY, UNSPECIFIED PULMONARY EMBOLISM TYPE, UNSPECIFIED WHETHER ACUTE COR PULMONALE PRESENT (HCC): ICD-10-CM

## 2023-11-12 DIAGNOSIS — O26.91 COMPLICATION OF PREGNANCY IN FIRST TRIMESTER: ICD-10-CM

## 2023-11-12 DIAGNOSIS — N30.01 ACUTE CYSTITIS WITH HEMATURIA: Primary | ICD-10-CM

## 2023-11-12 LAB
ABO + RH BLD: NORMAL
ALBUMIN SERPL-MCNC: 4.2 G/DL (ref 3.5–5.2)
ALP SERPL-CCNC: 63 U/L (ref 35–104)
ALT SERPL-CCNC: 13 U/L (ref 5–33)
ANION GAP SERPL CALCULATED.3IONS-SCNC: 9 MMOL/L (ref 9–17)
AST SERPL-CCNC: 16 U/L
B-HCG SERPL EIA 3RD IS-ACNC: ABNORMAL MIU/ML
BACTERIA URNS QL MICRO: ABNORMAL
BASOPHILS # BLD: 0 K/UL (ref 0–0.2)
BASOPHILS NFR BLD: 0 % (ref 0–2)
BILIRUB SERPL-MCNC: 0.2 MG/DL (ref 0.3–1.2)
BILIRUB UR QL STRIP: NEGATIVE
BLOOD BANK COMMENT: NORMAL
BUN SERPL-MCNC: 7 MG/DL (ref 6–20)
CALCIUM SERPL-MCNC: 11.2 MG/DL (ref 8.6–10.4)
CANDIDA SPECIES: NEGATIVE
CASTS #/AREA URNS LPF: ABNORMAL /LPF
CHLORIDE SERPL-SCNC: 99 MMOL/L (ref 98–107)
CLARITY UR: ABNORMAL
CO2 SERPL-SCNC: 24 MMOL/L (ref 20–31)
COLOR UR: YELLOW
CREAT SERPL-MCNC: 0.5 MG/DL (ref 0.5–0.9)
EOSINOPHIL # BLD: 0 K/UL (ref 0–0.4)
EOSINOPHILS RELATIVE PERCENT: 0 % (ref 0–4)
EPI CELLS #/AREA URNS HPF: ABNORMAL /HPF
ERYTHROCYTE [DISTWIDTH] IN BLOOD BY AUTOMATED COUNT: 13.8 % (ref 11.5–14.9)
GARDNERELLA VAGINALIS: NEGATIVE
GFR SERPL CREATININE-BSD FRML MDRD: >60 ML/MIN/1.73M2
GLUCOSE SERPL-MCNC: 82 MG/DL (ref 70–99)
GLUCOSE UR STRIP-MCNC: NEGATIVE MG/DL
HCG UR QL: POSITIVE
HCT VFR BLD AUTO: 38.8 % (ref 36–46)
HGB BLD-MCNC: 12.5 G/DL (ref 12–16)
HGB UR QL STRIP.AUTO: ABNORMAL
KETONES UR STRIP-MCNC: ABNORMAL MG/DL
LEUKOCYTE ESTERASE UR QL STRIP: ABNORMAL
LYMPHOCYTES NFR BLD: 2.1 K/UL (ref 1–4.8)
LYMPHOCYTES RELATIVE PERCENT: 21 % (ref 25–45)
MCH RBC QN AUTO: 27.4 PG (ref 26–34)
MCHC RBC AUTO-ENTMCNC: 32.2 G/DL (ref 31–37)
MCV RBC AUTO: 85.1 FL (ref 80–100)
MONOCYTES NFR BLD: 0.6 K/UL (ref 0.1–1.3)
MONOCYTES NFR BLD: 6 % (ref 2–8)
NEUTROPHILS NFR BLD: 73 % (ref 34–64)
NEUTS SEG NFR BLD: 7.5 K/UL (ref 1.3–9.1)
NITRITE UR QL STRIP: NEGATIVE
PH UR STRIP: 6 [PH] (ref 5–8)
PLATELET # BLD AUTO: 291 K/UL (ref 150–450)
PMV BLD AUTO: 7.6 FL (ref 6–12)
POTASSIUM SERPL-SCNC: 3.8 MMOL/L (ref 3.7–5.3)
PROT SERPL-MCNC: 7.8 G/DL (ref 6.4–8.3)
PROT UR STRIP-MCNC: ABNORMAL MG/DL
RBC # BLD AUTO: 4.56 M/UL (ref 4–5.2)
RBC #/AREA URNS HPF: ABNORMAL /HPF
SODIUM SERPL-SCNC: 132 MMOL/L (ref 135–144)
SOURCE: NORMAL
SP GR UR STRIP: 1.02 (ref 1–1.03)
TRICHOMONAS: NEGATIVE
UROBILINOGEN UR STRIP-ACNC: NORMAL EU/DL (ref 0–1)
WBC #/AREA URNS HPF: ABNORMAL /HPF
WBC OTHER # BLD: 10.3 K/UL (ref 4.5–13.5)

## 2023-11-12 PROCEDURE — 86901 BLOOD TYPING SEROLOGIC RH(D): CPT

## 2023-11-12 PROCEDURE — 87186 SC STD MICRODIL/AGAR DIL: CPT

## 2023-11-12 PROCEDURE — 6370000000 HC RX 637 (ALT 250 FOR IP): Performed by: EMERGENCY MEDICINE

## 2023-11-12 PROCEDURE — 87086 URINE CULTURE/COLONY COUNT: CPT

## 2023-11-12 PROCEDURE — 36415 COLL VENOUS BLD VENIPUNCTURE: CPT

## 2023-11-12 PROCEDURE — 84702 CHORIONIC GONADOTROPIN TEST: CPT

## 2023-11-12 PROCEDURE — 87480 CANDIDA DNA DIR PROBE: CPT

## 2023-11-12 PROCEDURE — 87088 URINE BACTERIA CULTURE: CPT

## 2023-11-12 PROCEDURE — 99283 EMERGENCY DEPT VISIT LOW MDM: CPT

## 2023-11-12 PROCEDURE — 87510 GARDNER VAG DNA DIR PROBE: CPT

## 2023-11-12 PROCEDURE — 87591 N.GONORRHOEAE DNA AMP PROB: CPT

## 2023-11-12 PROCEDURE — 87660 TRICHOMONAS VAGIN DIR PROBE: CPT

## 2023-11-12 PROCEDURE — 85025 COMPLETE CBC W/AUTO DIFF WBC: CPT

## 2023-11-12 PROCEDURE — 80053 COMPREHEN METABOLIC PANEL: CPT

## 2023-11-12 PROCEDURE — 87491 CHLMYD TRACH DNA AMP PROBE: CPT

## 2023-11-12 PROCEDURE — 81001 URINALYSIS AUTO W/SCOPE: CPT

## 2023-11-12 PROCEDURE — 81025 URINE PREGNANCY TEST: CPT

## 2023-11-12 PROCEDURE — 86403 PARTICLE AGGLUT ANTBDY SCRN: CPT

## 2023-11-12 PROCEDURE — 86900 BLOOD TYPING SEROLOGIC ABO: CPT

## 2023-11-12 RX ORDER — ENOXAPARIN SODIUM 100 MG/ML
40 INJECTION SUBCUTANEOUS DAILY
Qty: 12 ML | Refills: 0 | Status: SHIPPED | OUTPATIENT
Start: 2023-11-12 | End: 2023-11-12 | Stop reason: SDUPTHER

## 2023-11-12 RX ORDER — CEPHALEXIN 250 MG/1
500 CAPSULE ORAL ONCE
Status: COMPLETED | OUTPATIENT
Start: 2023-11-12 | End: 2023-11-12

## 2023-11-12 RX ORDER — CEPHALEXIN 500 MG/1
500 CAPSULE ORAL 3 TIMES DAILY
Qty: 15 CAPSULE | Refills: 0 | Status: SHIPPED | OUTPATIENT
Start: 2023-11-12 | End: 2023-11-12 | Stop reason: SDUPTHER

## 2023-11-12 RX ORDER — CEPHALEXIN 500 MG/1
500 CAPSULE ORAL 3 TIMES DAILY
Qty: 15 CAPSULE | Refills: 0 | Status: SHIPPED | OUTPATIENT
Start: 2023-11-12 | End: 2023-11-17

## 2023-11-12 RX ORDER — ENOXAPARIN SODIUM 100 MG/ML
40 INJECTION SUBCUTANEOUS DAILY
Qty: 12 ML | Refills: 0 | Status: SHIPPED | OUTPATIENT
Start: 2023-11-12 | End: 2023-12-12

## 2023-11-12 RX ADMIN — CEPHALEXIN 500 MG: 250 CAPSULE ORAL at 17:06

## 2023-11-12 ASSESSMENT — PAIN DESCRIPTION - LOCATION: LOCATION: ABDOMEN

## 2023-11-12 ASSESSMENT — PAIN DESCRIPTION - PAIN TYPE: TYPE: ACUTE PAIN

## 2023-11-12 ASSESSMENT — PAIN DESCRIPTION - DESCRIPTORS: DESCRIPTORS: CRAMPING

## 2023-11-12 ASSESSMENT — PAIN DESCRIPTION - ORIENTATION: ORIENTATION: MID

## 2023-11-12 ASSESSMENT — PAIN - FUNCTIONAL ASSESSMENT: PAIN_FUNCTIONAL_ASSESSMENT: 0-10

## 2023-11-12 ASSESSMENT — PAIN SCALES - GENERAL: PAINLEVEL_OUTOF10: 10

## 2023-11-12 NOTE — ED PROVIDER NOTES
depressive disorder, recurrent, severe with psychotic features [facility])          DISPOSITION/PLAN   DISPOSITION Decision To Discharge 11/12/2023 05:08:39 PM      OUTPATIENT FOLLOW UP THE PATIENT:  [facility]  [address]  [phone]  Schedule an appointment as soon as possible for a visit in 1 day      [address] [facility]  [id number] [facility] [address] [phone]  Schedule an appointment as soon as possible for a visit in 1 day        MD Jolynn Solano MD  11/12/23 6667

## 2023-11-12 NOTE — DISCHARGE INSTRUCTIONS
ENDOCRINOLOGY PROGRESS NOTE  Yoselyn Cisneros  Date of birth 1960  Date of service 9/24/2022  Admission date 6/24/2022  [id number]    Reason for Follow-up:  Evaluation and management of Secondary Diabetes Mellitus from Nutritional supplement and steroids with hyperglycemia.    History of Present Illness (taken from previous endocrine note):  This is a 62 year old Female who is admitted for diarrhea. She has a history of AML. Stem cell transplant 11/16/2021. Was started on Solumedrol IV BID ( up to 64 mg BID) on 7/1/2022 for possible GVHD. Was started on TPN with 75 grams dextrose on 7/13/2022. TPN stopped 7/20/22 due to rising t-bili. 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 TPN stopped 7/20/22.     Recent events/ROS:   On solumedrol IV 36mg daily.   NPO since 9/22/2022.   Tired.   Did not sleep well.       Current diet: Daily - Pm Snack; Ensure Clear Berry/clear Liquid Supplement, Berry Oral Nutrition Supplement  Npo Diet With Exceptions; Medications. Cont TF 1.5 quintin Osmolite formula, now off    Hospital Medications: prednisone 45 mg changed to Solumedrol 36 mg IV daily, Humalog SSI every 6 hours. LT4 125 mcg QD (increased from 100 mcg on 9/11/22)    When on TPN: Lantus 16 units at night; Regular Insulin 8 units q 6 hrs at 0000, 0600, 1200, 1800 +2:50>150. TPN x 18 hours from 2200 to 1500 (300 grams of dextrose and 25 units of regular insulin in bag), Levothyroxine 100 mcg.      Blood sugar review: Last 24 hours blood sugars ranged   Recent Labs   Lab 09/23/22  1801 09/23/22  2121 09/23/22  2358 09/24/22  0608   GLUCOSE BEDSIDE 241* 197* 198* 204*     Home Regimen: No diabetes home regimen. Levothyroxine 100 mcg    HISTORY: Past medical, surgical, family and social history reviewed from initial endocrine note.    PHYSICAL EXAM:  Visit Vitals  /78 (BP Location: RUE - Right upper extremity)   Pulse 95   Temp 97.8 °F (36.6 °C) (Oral)   Resp 18   Ht 5' 2\" (1.575 m)   Wt  You can continue taking daily abilify, remeron, melatonin, prilosec 80.9 kg (178 lb 5.6 oz)   SpO2 100%   BMI 32.62 kg/m²   Constitutional: In bed. Fatigued and chronically ill appearing  Pale.   Eyes: icteric   ENT: no injection, dobhoff in nare  Respiratory: non-labored.  Skin: Jaundiced. Warm, dry, no rashes on exposed skin areas.   Neurologic: No resting tremors. Soft voice.   B/l LE edema.     Lab Review:  Hemoglobin A1C (%)   Date Value   07/14/2022 6.0 (H)     Creatinine (mg/dL)   Date Value   09/24/2022 0.56     TSH (mcUnits/mL)   Date Value   09/10/2022 6.266 (H)     T4, Free (ng/dL)   Date Value   09/10/2022 0.6 (L)     HGB (g/dL)   Date Value   09/24/2022 6.0 (LL)     Assessment/Plan:  Secondary Diabetes Mellitus from nutritional support (TPN stopped 7/20/22 due to rising t-bili, restarted 8/3-8/8) and steroids.     Was on prednisone 45 mg daily that switched to Solumedrol 36 mg IV.   TF off.   Increase lantus from 6 to 8 units daily.   Continue with Humalog correctional insulin q6hrs.   Check BG per protocol  Continue hypoglycemia protocol PRN     Notify endocrine with steroid or diet changes    Hypothyroidism  Levothyroxine increased to 125 mcg daily on 9/11/22   Repeat TFTs in 4 weeks (by mid Oct)    We will continue to follow and titrate/manage medications as appropriate.     Jovita Zuniga MD

## 2023-11-12 NOTE — ED TRIAGE NOTES
Mode of arrival (squad #, walk in, police, etc) : walk in        Chief complaint(s): Pt would like preg test; pt c/o abdominal pain        Arrival Note (brief scenario, treatment PTA, etc). : Pt states LMP was August. States has had multiple positive preg tests but dad stated those may not be accurate. Pt states abdominal pain X3 weeks. C= \"Have you ever felt that you should Cut down on your drinking? \"  No  A= \"Have people Annoyed you by criticizing your drinking? \"  No  G= \"Have you ever felt bad or Guilty about your drinking? \"  No  E= \"Have you ever had a drink as an Eye-opener first thing in the morning to steady your nerves or to help a hangover? \"  No      Deferred []      Reason for deferring: N/A    *If yes to two or more: probable alcohol abuse. * Bactrim Pregnancy And Lactation Text: This medication is Pregnancy Category D and is known to cause fetal risk.  It is also excreted in breast milk.

## 2023-11-13 LAB
C TRACH DNA SPEC QL PROBE+SIG AMP: NEGATIVE
N GONORRHOEA DNA SPEC QL PROBE+SIG AMP: NEGATIVE
SPECIMEN DESCRIPTION: NORMAL

## 2023-11-14 LAB
MICROORGANISM SPEC CULT: ABNORMAL
MICROORGANISM SPEC CULT: ABNORMAL
SPECIMEN DESCRIPTION: ABNORMAL

## 2023-11-15 ENCOUNTER — TELEPHONE (OUTPATIENT)
Dept: OBGYN | Age: 23
End: 2023-11-15

## 2023-11-15 RX ORDER — ISOPROPYL ALCOHOL 700 MG/ML
1 CLOTH TOPICAL DAILY PRN
Qty: 100 EACH | Refills: 5 | Status: SHIPPED | OUTPATIENT
Start: 2023-11-15

## 2023-11-15 NOTE — TELEPHONE ENCOUNTER
Patient called requesting alcohol wipes be sent to her pharmacy. Patient is going to be a new OB patient and is on lovenox. Please advise.

## 2023-11-16 ENCOUNTER — TELEPHONE (OUTPATIENT)
Dept: OBGYN | Age: 23
End: 2023-11-16

## 2023-11-16 NOTE — TELEPHONE ENCOUNTER
Patient is pregnant, she has not yet had her dating ultrasound. She is calling complaining that her blood thinner shots are hurting her belly. She was prescribed them in the hospital and they told her to call our office about the pain. Please advise.

## 2023-11-20 ENCOUNTER — TELEPHONE (OUTPATIENT)
Dept: OBGYN | Age: 23
End: 2023-11-20

## 2023-11-20 NOTE — TELEPHONE ENCOUNTER
Patient called asking if she could continue her Abilify during pregnancy. Writer spoke to [facility] and relayed to patient that there are no studies of pregnant women on Abilify. Patient was educated that we would prefer a happy mom over stopping her medication due to pregnancy. Patient has been on Abilify and it works for her so patient is going to continue the medication.

## 2023-11-24 ENCOUNTER — APPOINTMENT (OUTPATIENT)
Dept: GENERAL RADIOLOGY | Age: 23
End: 2023-11-24
Payer: COMMERCIAL

## 2023-11-24 ENCOUNTER — HOSPITAL ENCOUNTER (EMERGENCY)
Age: 23
Discharge: HOME | End: 2023-11-25
Attending: EMERGENCY MEDICINE
Payer: COMMERCIAL

## 2023-11-24 DIAGNOSIS — N30.00 ACUTE CYSTITIS WITHOUT HEMATURIA: Primary | ICD-10-CM

## 2023-11-24 LAB
ALBUMIN SERPL-MCNC: 3.6 G/DL (ref 3.5–5.2)
ALBUMIN/GLOB SERPL: 1 {RATIO} (ref 1–2.5)
ALP SERPL-CCNC: 52 U/L (ref 35–104)
ALT SERPL-CCNC: 14 U/L (ref 5–33)
ANION GAP SERPL CALCULATED.3IONS-SCNC: 11 MMOL/L (ref 9–17)
AST SERPL-CCNC: 17 U/L
BASOPHILS # BLD: 0.04 K/UL (ref 0–0.2)
BASOPHILS NFR BLD: 0 % (ref 0–2)
BILIRUB SERPL-MCNC: 0.2 MG/DL (ref 0.3–1.2)
BUN SERPL-MCNC: 10 MG/DL (ref 6–20)
CALCIUM SERPL-MCNC: 10.8 MG/DL (ref 8.6–10.4)
CHLORIDE SERPL-SCNC: 102 MMOL/L (ref 98–107)
CO2 SERPL-SCNC: 21 MMOL/L (ref 20–31)
CREAT SERPL-MCNC: 0.4 MG/DL (ref 0.5–0.9)
EOSINOPHIL # BLD: 0.04 K/UL (ref 0–0.44)
EOSINOPHILS RELATIVE PERCENT: 0 % (ref 1–4)
ERYTHROCYTE [DISTWIDTH] IN BLOOD BY AUTOMATED COUNT: 12.6 % (ref 11.8–14.4)
GFR SERPL CREATININE-BSD FRML MDRD: >60 ML/MIN/1.73M2
GLUCOSE SERPL-MCNC: 91 MG/DL (ref 70–99)
HCT VFR BLD AUTO: 38.8 % (ref 36.3–47.1)
HGB BLD-MCNC: 12.6 G/DL (ref 11.9–15.1)
IMM GRANULOCYTES # BLD AUTO: 0.05 K/UL (ref 0–0.3)
IMM GRANULOCYTES NFR BLD: 0 %
LIPASE SERPL-CCNC: 21 U/L (ref 13–60)
LYMPHOCYTES NFR BLD: 2.29 K/UL (ref 1.1–3.7)
LYMPHOCYTES RELATIVE PERCENT: 16 % (ref 25–45)
MCH RBC QN AUTO: 28.2 PG (ref 25.2–33.5)
MCHC RBC AUTO-ENTMCNC: 32.5 G/DL (ref 28.4–34.8)
MCV RBC AUTO: 86.8 FL (ref 82.6–102.9)
MONOCYTES NFR BLD: 0.88 K/UL (ref 0.1–1.4)
MONOCYTES NFR BLD: 6 % (ref 2–8)
NEUTROPHILS NFR BLD: 78 % (ref 34–64)
NEUTS SEG NFR BLD: 10.86 K/UL (ref 1.5–8.1)
NRBC BLD-RTO: 0 PER 100 WBC
PLATELET # BLD AUTO: 255 K/UL (ref 138–453)
PMV BLD AUTO: 9.4 FL (ref 8.1–13.5)
POTASSIUM SERPL-SCNC: 3.6 MMOL/L (ref 3.7–5.3)
PROT SERPL-MCNC: 7.1 G/DL (ref 6.4–8.3)
RBC # BLD AUTO: 4.47 M/UL (ref 3.95–5.11)
SODIUM SERPL-SCNC: 134 MMOL/L (ref 135–144)
WBC OTHER # BLD: 14.2 K/UL (ref 4.5–13.5)

## 2023-11-24 PROCEDURE — 99284 EMERGENCY DEPT VISIT MOD MDM: CPT

## 2023-11-24 PROCEDURE — 73030 X-RAY EXAM OF SHOULDER: CPT

## 2023-11-24 PROCEDURE — 80053 COMPREHEN METABOLIC PANEL: CPT

## 2023-11-24 PROCEDURE — 87086 URINE CULTURE/COLONY COUNT: CPT

## 2023-11-24 PROCEDURE — 85025 COMPLETE CBC W/AUTO DIFF WBC: CPT

## 2023-11-24 PROCEDURE — 81001 URINALYSIS AUTO W/SCOPE: CPT

## 2023-11-24 PROCEDURE — 6370000000 HC RX 637 (ALT 250 FOR IP)

## 2023-11-24 PROCEDURE — 83690 ASSAY OF LIPASE: CPT

## 2023-11-24 RX ORDER — ACETAMINOPHEN 500 MG
1000 TABLET ORAL ONCE
Status: COMPLETED | OUTPATIENT
Start: 2023-11-24 | End: 2023-11-24

## 2023-11-24 RX ADMIN — ACETAMINOPHEN 1000 MG: 500 TABLET ORAL at 23:22

## 2023-11-24 ASSESSMENT — PAIN SCALES - GENERAL
PAINLEVEL_OUTOF10: 10
PAINLEVEL_OUTOF10: 10

## 2023-11-24 ASSESSMENT — PAIN - FUNCTIONAL ASSESSMENT: PAIN_FUNCTIONAL_ASSESSMENT: 0-10

## 2023-11-25 ENCOUNTER — ANCILLARY PROCEDURE (OUTPATIENT)
Dept: EMERGENCY DEPT | Age: 23
End: 2023-11-25
Attending: EMERGENCY MEDICINE
Payer: COMMERCIAL

## 2023-11-25 VITALS
HEART RATE: 85 BPM | OXYGEN SATURATION: 100 % | SYSTOLIC BLOOD PRESSURE: 121 MMHG | DIASTOLIC BLOOD PRESSURE: 84 MMHG | TEMPERATURE: 98.1 F | RESPIRATION RATE: 18 BRPM | HEIGHT: 66 IN | BODY MASS INDEX: 22.66 KG/M2 | WEIGHT: 141 LBS

## 2023-11-25 LAB
BACTERIA URNS QL MICRO: ABNORMAL
BILIRUB UR QL STRIP: NEGATIVE
CASTS #/AREA URNS LPF: ABNORMAL /LPF (ref 0–8)
CLARITY UR: ABNORMAL
COLOR UR: YELLOW
EPI CELLS #/AREA URNS HPF: ABNORMAL /HPF (ref 0–5)
GLUCOSE UR STRIP-MCNC: NEGATIVE MG/DL
HGB UR QL STRIP.AUTO: NEGATIVE
KETONES UR STRIP-MCNC: NEGATIVE MG/DL
LEUKOCYTE ESTERASE UR QL STRIP: ABNORMAL
MICROORGANISM SPEC CULT: NORMAL
NITRITE UR QL STRIP: NEGATIVE
PH UR STRIP: 6 [PH] (ref 5–8)
PROT UR STRIP-MCNC: ABNORMAL MG/DL
RBC #/AREA URNS HPF: ABNORMAL /HPF (ref 0–4)
SP GR UR STRIP: 1.02 (ref 1–1.03)
SPECIMEN DESCRIPTION: NORMAL
UROBILINOGEN UR STRIP-ACNC: NORMAL EU/DL (ref 0–1)
WBC #/AREA URNS HPF: ABNORMAL /HPF (ref 0–5)

## 2023-11-25 PROCEDURE — 76815 OB US LIMITED FETUS(S): CPT

## 2023-11-25 PROCEDURE — 6370000000 HC RX 637 (ALT 250 FOR IP)

## 2023-11-25 RX ORDER — CEPHALEXIN 250 MG/1
500 CAPSULE ORAL ONCE
Status: COMPLETED | OUTPATIENT
Start: 2023-11-25 | End: 2023-11-25

## 2023-11-25 RX ORDER — CEPHALEXIN 500 MG/1
500 CAPSULE ORAL 4 TIMES DAILY
Qty: 28 CAPSULE | Refills: 0 | Status: SHIPPED | OUTPATIENT
Start: 2023-11-25 | End: 2023-12-02

## 2023-11-25 RX ADMIN — CEPHALEXIN 500 MG: 250 CAPSULE ORAL at 01:27

## 2023-11-25 ASSESSMENT — ENCOUNTER SYMPTOMS: ABDOMINAL PAIN: 1
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
## 2023-11-25 NOTE — ED NOTES
SW used [employer] to book transportation for pt going to [address] [place] [address].   Trip # [id number]     [place], [place]  11/25/23 8325

## 2023-12-05 ENCOUNTER — HOSPITAL ENCOUNTER (OUTPATIENT)
Age: 23
Discharge: HOME | End: 2023-12-07
Payer: COMMERCIAL

## 2023-12-05 VITALS
DIASTOLIC BLOOD PRESSURE: 70 MMHG | BODY MASS INDEX: 22.66 KG/M2 | WEIGHT: 141 LBS | HEIGHT: 66 IN | SYSTOLIC BLOOD PRESSURE: 99 MMHG

## 2023-12-05 DIAGNOSIS — R01.1 HEART MURMUR: ICD-10-CM

## 2023-12-05 LAB
ECHO AO ROOT DIAM: 2.6 CM
ECHO AO ROOT INDEX: 1.51 CM/M2
ECHO AV AREA PEAK VELOCITY: 2.1 CM2
ECHO AV AREA VTI: 2 CM2
ECHO AV AREA/BSA PEAK VELOCITY: 1.2 CM2/M2
ECHO AV AREA/BSA VTI: 1.2 CM2/M2
ECHO AV MEAN GRADIENT: 5 MMHG
ECHO AV MEAN VELOCITY: 1 M/S
ECHO AV PEAK GRADIENT: 10 MMHG
ECHO AV PEAK VELOCITY: 1.6 M/S
ECHO AV VELOCITY RATIO: 0.75
ECHO AV VTI: 23.4 CM
ECHO BSA: 1.73 M2
ECHO EST RA PRESSURE: 3 MMHG
ECHO LA AREA 2C: 12.3 CM2
ECHO LA AREA 4C: 11.7 CM2
ECHO LA DIAMETER INDEX: 1.51 CM/M2
ECHO LA DIAMETER: 2.6 CM
ECHO LA MAJOR AXIS: 5.5 CM
ECHO LA MINOR AXIS: 4.2 CM
ECHO LA TO AORTIC ROOT RATIO: 1
ECHO LA VOL MOD A2C: 30 ML (ref 22–52)
ECHO LA VOL MOD A4C: 22 ML (ref 22–52)
ECHO LA VOLUME INDEX MOD A2C: 17 ML/M2 (ref 16–34)
ECHO LA VOLUME INDEX MOD A4C: 13 ML/M2 (ref 16–34)
ECHO LV E' LATERAL VELOCITY: 13 CM/S
ECHO LV E' SEPTAL VELOCITY: 15 CM/S
ECHO LV EDV A2C: 46 ML
ECHO LV EDV A4C: 51 ML
ECHO LV EDV INDEX A4C: 30 ML/M2
ECHO LV EDV NDEX A2C: 27 ML/M2
ECHO LV EJECTION FRACTION A2C: 63 %
ECHO LV EJECTION FRACTION A4C: 64 %
ECHO LV EJECTION FRACTION BIPLANE: 63 % (ref 55–100)
ECHO LV ESV A2C: 17 ML
ECHO LV ESV A4C: 18 ML
ECHO LV ESV INDEX A2C: 10 ML/M2
ECHO LV ESV INDEX A4C: 10 ML/M2
ECHO LV FRACTIONAL SHORTENING: 36 % (ref 28–44)
ECHO LV INTERNAL DIMENSION DIASTOLE INDEX: 2.44 CM/M2
ECHO LV INTERNAL DIMENSION DIASTOLIC: 4.2 CM (ref 3.9–5.3)
ECHO LV INTERNAL DIMENSION SYSTOLIC INDEX: 1.57 CM/M2
ECHO LV INTERNAL DIMENSION SYSTOLIC: 2.7 CM
ECHO LV IVSD: 0.9 CM (ref 0.6–0.9)
ECHO LV MASS 2D: 109.8 G (ref 67–162)
ECHO LV MASS INDEX 2D: 63.9 G/M2 (ref 43–95)
ECHO LV POSTERIOR WALL DIASTOLIC: 0.8 CM (ref 0.6–0.9)
ECHO LV RELATIVE WALL THICKNESS RATIO: 0.38
ECHO LVOT AREA: 2.8 CM2
ECHO LVOT AV VTI INDEX: 0.71
ECHO LVOT DIAM: 1.9 CM
ECHO LVOT MEAN GRADIENT: 3 MMHG
ECHO LVOT PEAK GRADIENT: 6 MMHG
ECHO LVOT PEAK VELOCITY: 1.2 M/S
ECHO LVOT STROKE VOLUME INDEX: 27.3 ML/M2
ECHO LVOT SV: 47 ML
ECHO LVOT VTI: 16.6 CM
ECHO MV A VELOCITY: 0.95 M/S
ECHO MV AREA VTI: 2.8 CM2
ECHO MV E DECELERATION TIME (DT): 93 MS
ECHO MV E VELOCITY: 0.85 M/S
ECHO MV E/A RATIO: 0.89
ECHO MV E/E' LATERAL: 6.54
ECHO MV E/E' RATIO (AVERAGED): 6.1
ECHO MV LVOT VTI INDEX: 1.01
ECHO MV MAX VELOCITY: 1.1 M/S
ECHO MV MEAN GRADIENT: 3 MMHG
ECHO MV MEAN VELOCITY: 0.8 M/S
ECHO MV PEAK GRADIENT: 4 MMHG
ECHO MV VTI: 16.7 CM
ECHO PV MAX VELOCITY: 1.2 M/S
ECHO PV PEAK GRADIENT: 5 MMHG
ECHO RA AREA 4C: 9.1 CM2
ECHO RIGHT VENTRICULAR SYSTOLIC PRESSURE (RVSP): 21 MMHG
ECHO RV BASAL DIMENSION: 2.8 CM
ECHO RV FREE WALL PEAK S': 13 CM/S
ECHO RV TAPSE: 1.6 CM (ref 1.7–?)
ECHO TV REGURGITANT MAX VELOCITY: 2.14 M/S
ECHO TV REGURGITANT PEAK GRADIENT: 18 MMHG

## 2023-12-05 PROCEDURE — 93306 TTE W/DOPPLER COMPLETE: CPT | Performed by: INTERNAL MEDICINE

## 2023-12-05 PROCEDURE — 93306 TTE W/DOPPLER COMPLETE: CPT

## 2023-12-06 ENCOUNTER — SCHEDULED TELEPHONE ENCOUNTER (OUTPATIENT)
Dept: OBGYN | Age: 23
End: 2023-12-06

## 2023-12-06 DIAGNOSIS — Z59.00 HOMELESSNESS: ICD-10-CM

## 2023-12-06 DIAGNOSIS — Z3A.13 13 WEEKS GESTATION OF PREGNANCY: ICD-10-CM

## 2023-12-06 DIAGNOSIS — O99.280 THYROID DISEASE AFFECTING PREGNANCY: ICD-10-CM

## 2023-12-06 DIAGNOSIS — F33.3 SEVERE RECURRENT MAJOR DEPRESSION WITH PSYCHOTIC FEATURES (HCC): ICD-10-CM

## 2023-12-06 DIAGNOSIS — F33.3 MAJOR DEPRESSIVE DISORDER, RECURRENT, SEVERE WITH PSYCHOTIC FEATURES (HCC): ICD-10-CM

## 2023-12-06 DIAGNOSIS — R45.851 DEPRESSION WITH SUICIDAL IDEATION: ICD-10-CM

## 2023-12-06 DIAGNOSIS — F31.9 BIPOLAR 1 DISORDER (HCC): ICD-10-CM

## 2023-12-06 DIAGNOSIS — F32.A DEPRESSION WITH SUICIDAL IDEATION: ICD-10-CM

## 2023-12-06 DIAGNOSIS — R01.1 HEART MURMUR: ICD-10-CM

## 2023-12-06 DIAGNOSIS — Z86.69 HISTORY OF MIGRAINE WITH AURA: Primary | ICD-10-CM

## 2023-12-06 DIAGNOSIS — I26.99 PULMONARY EMBOLISM, UNSPECIFIED CHRONICITY, UNSPECIFIED PULMONARY EMBOLISM TYPE, UNSPECIFIED WHETHER ACUTE COR PULMONALE PRESENT (HCC): ICD-10-CM

## 2023-12-06 DIAGNOSIS — F41.9 ANXIETY: ICD-10-CM

## 2023-12-06 DIAGNOSIS — F43.10 PTSD (POST-TRAUMATIC STRESS DISORDER): ICD-10-CM

## 2023-12-06 DIAGNOSIS — O21.9 NAUSEA AND VOMITING DURING PREGNANCY: ICD-10-CM

## 2023-12-06 DIAGNOSIS — E07.9 THYROID DISEASE AFFECTING PREGNANCY: ICD-10-CM

## 2023-12-06 RX ORDER — ARIPIPRAZOLE 300 MG
300 KIT INTRAMUSCULAR
COMMUNITY

## 2023-12-06 RX ORDER — LANOLIN ALCOHOL/MO/W.PET/CERES
25 CREAM (GRAM) TOPICAL 3 TIMES DAILY
Qty: 90 TABLET | Refills: 0 | Status: SHIPPED | OUTPATIENT
Start: 2023-12-06 | End: 2024-02-04

## 2023-12-06 RX ORDER — ENOXAPARIN SODIUM 100 MG/ML
40 INJECTION SUBCUTANEOUS DAILY
Qty: 12 ML | Refills: 5 | Status: SHIPPED | OUTPATIENT
Start: 2023-12-06

## 2023-12-06 RX ORDER — ONDANSETRON 4 MG/1
4 TABLET, FILM COATED ORAL EVERY 8 HOURS PRN
Qty: 30 TABLET | Refills: 2 | Status: SHIPPED | OUTPATIENT
Start: 2023-12-06

## 2023-12-06 SDOH — ECONOMIC STABILITY - HOUSING INSECURITY: HOMELESSNESS UNSPECIFIED: Z59.00
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
## 2023-12-07 ENCOUNTER — ROUTINE PRENATAL (OUTPATIENT)
Dept: PERINATAL CARE | Age: 23
End: 2023-12-07
Payer: COMMERCIAL

## 2023-12-07 VITALS
TEMPERATURE: 99.8 F | SYSTOLIC BLOOD PRESSURE: 122 MMHG | WEIGHT: 146 LBS | HEART RATE: 88 BPM | DIASTOLIC BLOOD PRESSURE: 80 MMHG | BODY MASS INDEX: 23.46 KG/M2 | HEIGHT: 66 IN | RESPIRATION RATE: 16 BRPM

## 2023-12-07 DIAGNOSIS — Z3A.13 13 WEEKS GESTATION OF PREGNANCY: ICD-10-CM

## 2023-12-07 DIAGNOSIS — Z86.711 HISTORY OF PULMONARY EMBOLISM: ICD-10-CM

## 2023-12-07 DIAGNOSIS — O16.1 HYPERTENSION AFFECTING PREGNANCY IN FIRST TRIMESTER: ICD-10-CM

## 2023-12-07 DIAGNOSIS — Z36.9 FIRST TRIMESTER SCREENING: Primary | ICD-10-CM

## 2023-12-07 DIAGNOSIS — O35.2XX0 HEREDITARY FAMILIAL DISEASE AFFECTING MANAGEMENT OF MOTHER AND POSSIBLY AFFECTING FETUS, ANTEPARTUM, SINGLE OR UNSPECIFIED FETUS: ICD-10-CM

## 2023-12-07 DIAGNOSIS — O36.80X0 ENCOUNTER TO DETERMINE FETAL VIABILITY OF PREGNANCY, SINGLE OR UNSPECIFIED FETUS: ICD-10-CM

## 2023-12-07 DIAGNOSIS — O09.891 MEDICATION EXPOSURE DURING FIRST TRIMESTER OF PREGNANCY: ICD-10-CM

## 2023-12-07 PROBLEM — O10.919 CHRONIC HYPERTENSION AFFECTING PREGNANCY: Status: ACTIVE | Noted: 2023-12-07

## 2023-12-07 LAB
CRL: NORMAL
SAC DIAMETER: NORMAL

## 2023-12-07 PROCEDURE — 99999 PR OFFICE/OUTPT VISIT,PROCEDURE ONLY: CPT | Performed by: OBSTETRICS & GYNECOLOGY

## 2023-12-07 PROCEDURE — 76813 OB US NUCHAL MEAS 1 GEST: CPT | Performed by: OBSTETRICS & GYNECOLOGY

## 2023-12-07 PROCEDURE — 76801 OB US < 14 WKS SINGLE FETUS: CPT | Performed by: OBSTETRICS & GYNECOLOGY

## 2023-12-07 NOTE — PROGRESS NOTES
Obstetric/Gynecology Maternal Fetal Medicine Resident Note    Patient has a formal consult with MFM attending physician scheduled 2/2 cHTN, personal history of PE, Hx Autism, fetal exposure to mirtazapine and Abilify, family history and personal history of a heart murmur (verbally reported).      Rebecca Barcenas MD  OBGYN Resident, PGY2  [facility]  12/7/2023, 4:54 PM

## 2023-12-08 ENCOUNTER — HOSPITAL ENCOUNTER (OUTPATIENT)
Age: 23
Discharge: HOME | End: 2023-12-08
Payer: COMMERCIAL

## 2023-12-08 PROCEDURE — 36415 COLL VENOUS BLD VENIPUNCTURE: CPT

## 2023-12-08 PROCEDURE — 81508 FTL CGEN ABNOR TWO PROTEINS: CPT

## 2023-12-10 LAB
AFP INTERPRETATION: NORMAL
AFP SPECIMEN: NORMAL
CRL: 72.8 MM
CROWN RUMP LENGTH TWIN B: NORMAL MM
CROWN RUMP LENGTH: 72.8
DATE OF BIRTH: NORMAL
DONOR EGG?: NORMAL
GESTATIONAL AGE: NORMAL
HX OF HEREDITARY DISORDERS: NO
IN VITRO FERTILIZATION: NEGATIVE
MATERNAL AGE AT EDD: 21.7 YR
MATERNAL SCREEN, EER: NORMAL
MATERNAL WEIGHT: 146
MOM FOR NT, TWIN B: NORMAL
MOM FOR NT: 0.84
MOM FOR PAPP-A: 0.79
MONOCHORIONIC TWINS: NEGATIVE
MSHCG-MOM: 1.09
MSHCG: NORMAL IU/L
NUCHAL TRANSLUC (NT): 1.4
NUCHAL TRANSLUC (NT): 1.4 MM
NUCHAL TRANSLUCENCY TWIN B: NORMAL MM
NUMBER OF FETUSES: 1
NUMBER OF FETUSES: NORMAL
PAPP-A MOM: 1086.9 NG/ML
PATIENT WEIGHT UNITS: NORMAL
PATIENT WEIGHT: NORMAL
PREV TRISOMY PREG: NEGATIVE
RACE (MATERNAL): NORMAL
RACE: NORMAL
READING MD CERT NUM: NORMAL
READING MD NAME: NORMAL
REPEAT SPECIMEN?: NEGATIVE
SMOKING: NEGATIVE
SMOKING: NO
SONOGRAPHER CERT NO: NORMAL
SONOGRAPHER CERT NO: NORMAL
SONOGRAPHER NAME: NORMAL
SONOGRAPHER NAME: NORMAL
ULTRASOUND DATE: NORMAL
ULTRASOUND DATE: NORMAL

## 2023-12-12 ENCOUNTER — TELEPHONE (OUTPATIENT)
Dept: OBGYN | Age: 23
End: 2023-12-12

## 2023-12-12 NOTE — TELEPHONE ENCOUNTER
Patient called the office stating she cannot give herself Lovenox and wants to know if we can give it to her. Patient is supposed to get Lovenox daily and it is not ideal for her to come into the office daily for help with her injection. Please advise.

## 2023-12-14 NOTE — TELEPHONE ENCOUNTER
Patient states that her partner and family member were given her Lovenox injections previously. She is now in a shelter and has not given herself and injection for 2 days. Per Dr. John Medina I offered her an appointment for injection today. Patient has appointment today at [facility] and can not come in today.   Appt scheduled for tomorrow at 10:00 am.

## 2023-12-15 ENCOUNTER — NURSE ONLY (OUTPATIENT)
Dept: OBGYN | Age: 23
End: 2023-12-15

## 2023-12-15 VITALS — WEIGHT: 145 LBS | BODY MASS INDEX: 23.4 KG/M2

## 2023-12-15 NOTE — PROGRESS NOTES
Lab Visit: Pt was taught how to give her Lovenox injection by Dr. Mj Harris. Pt administered her injection in front of Dr. Mj Harris. Instructions was given to Pt to take home. Thank you.

## 2023-12-18 ENCOUNTER — INITIAL PRENATAL (OUTPATIENT)
Dept: OBGYN | Age: 23
End: 2023-12-18
Payer: COMMERCIAL

## 2023-12-18 VITALS
WEIGHT: 149 LBS | SYSTOLIC BLOOD PRESSURE: 129 MMHG | HEART RATE: 101 BPM | BODY MASS INDEX: 24.05 KG/M2 | DIASTOLIC BLOOD PRESSURE: 73 MMHG

## 2023-12-18 DIAGNOSIS — R01.1 HEART MURMUR: ICD-10-CM

## 2023-12-18 DIAGNOSIS — R82.71 GBS BACTERIURIA: ICD-10-CM

## 2023-12-18 DIAGNOSIS — Z3A.14 14 WEEKS GESTATION OF PREGNANCY: ICD-10-CM

## 2023-12-18 DIAGNOSIS — F84.0 AUTISM: ICD-10-CM

## 2023-12-18 DIAGNOSIS — Z91.410 HISTORY OF SEXUAL ABUSE IN ADULTHOOD: ICD-10-CM

## 2023-12-18 DIAGNOSIS — I26.99 PULMONARY EMBOLISM, UNSPECIFIED CHRONICITY, UNSPECIFIED PULMONARY EMBOLISM TYPE, UNSPECIFIED WHETHER ACUTE COR PULMONALE PRESENT (HCC): ICD-10-CM

## 2023-12-18 DIAGNOSIS — F90.8 ATTENTION DEFICIT HYPERACTIVITY DISORDER (ADHD), OTHER TYPE: ICD-10-CM

## 2023-12-18 DIAGNOSIS — O10.919 CHRONIC HYPERTENSION AFFECTING PREGNANCY: ICD-10-CM

## 2023-12-18 DIAGNOSIS — O09.90 HIGH RISK PREGNANCY, ANTEPARTUM: Primary | ICD-10-CM

## 2023-12-18 DIAGNOSIS — Z59.00 HOMELESSNESS: ICD-10-CM

## 2023-12-18 DIAGNOSIS — F33.3 SEVERE RECURRENT MAJOR DEPRESSION WITH PSYCHOTIC FEATURES (HCC): ICD-10-CM

## 2023-12-18 PROBLEM — Z23 FLU VACCINE NEED: Status: RESOLVED | Noted: 2023-10-12 | Resolved: 2023-12-18

## 2023-12-18 PROBLEM — F32.A DEPRESSION WITH SUICIDAL IDEATION: Status: RESOLVED | Noted: 2023-03-29 | Resolved: 2023-12-18

## 2023-12-18 PROBLEM — R45.851 DEPRESSION WITH SUICIDAL IDEATION: Status: RESOLVED | Noted: 2023-03-29 | Resolved: 2023-12-18

## 2023-12-18 PROBLEM — F90.9 ADHD: Status: ACTIVE | Noted: 2023-12-18

## 2023-12-18 PROCEDURE — 1036F TOBACCO NON-USER: CPT

## 2023-12-18 PROCEDURE — 99214 OFFICE O/P EST MOD 30 MIN: CPT

## 2023-12-18 PROCEDURE — S0197 PRENATAL VITAMINS 30 DAY: HCPCS

## 2023-12-18 PROCEDURE — G8420 CALC BMI NORM PARAMETERS: HCPCS

## 2023-12-18 PROCEDURE — G8427 DOCREV CUR MEDS BY ELIG CLIN: HCPCS

## 2023-12-18 PROCEDURE — G8482 FLU IMMUNIZE ORDER/ADMIN: HCPCS

## 2023-12-18 RX ORDER — ASPIRIN 81 MG/1
81 TABLET ORAL DAILY
Qty: 90 TABLET | Refills: 1 | Status: SHIPPED | OUTPATIENT
Start: 2023-12-18

## 2023-12-18 SDOH — ECONOMIC STABILITY - HOUSING INSECURITY: HOMELESSNESS UNSPECIFIED: Z59.00

## 2023-12-20 ENCOUNTER — TELEPHONE (OUTPATIENT)
Dept: OBGYN | Age: 23
End: 2023-12-20

## 2023-12-20 NOTE — TELEPHONE ENCOUNTER
Patient called the office wondering if she can get a home health nurse to come to her house to give her the lovenox injections.  She states she is unable to give them to herself.  Writer asked for more information on what she needed help with and patient states it hurts and she is not able to get the needle to puncture her skin

## 2023-12-22 NOTE — TELEPHONE ENCOUNTER
Per Domonique Treadwell    We mostly use [facility] and [facility] but those are for peds.  [facility], [facility], [facility], [facility] are four that i can think of off the top of my head.  Some are picky about medicaid too.  If you call their offices they can often tell you.  I did call [facility] yesterday and just asked the question and they seemed to want to know more info than what I had.

## 2023-12-22 NOTE — TELEPHONE ENCOUNTER
Writer and  called and spoke with patient.  Patients [facility] has a care manager that she is currently working with to help her get a home health aide.  Patient is currently seeing Brian on [place] and is seeing Vladimir Gibbs (psychiatrist).  Patients  at [facility] is Jennifer dumont.  Patient states the last time she had her lovenox injection was when she was in the office.  Patient states her Ex used to given them but they are no longer together.  She has a friend who said he would be able to help her but the shelter she is at advised them not to continue trying until the aid is able to come out and do it properly

## 2023-12-22 NOTE — TELEPHONE ENCOUNTER
Spoke with Domonique Treadwell, , we are trying to see if she is able to get a home health nurse to come out.  Awaiting a response

## 2023-12-25 ENCOUNTER — HOSPITAL ENCOUNTER (EMERGENCY)
Age: 23
Discharge: HOME | End: 2023-12-25
Attending: EMERGENCY MEDICINE
Payer: COMMERCIAL

## 2023-12-25 VITALS
HEART RATE: 90 BPM | SYSTOLIC BLOOD PRESSURE: 121 MMHG | BODY MASS INDEX: 24.11 KG/M2 | RESPIRATION RATE: 18 BRPM | OXYGEN SATURATION: 99 % | TEMPERATURE: 98.3 F | HEIGHT: 66 IN | WEIGHT: 150 LBS | DIASTOLIC BLOOD PRESSURE: 64 MMHG

## 2023-12-25 DIAGNOSIS — R79.89 ELEVATED D-DIMER: ICD-10-CM

## 2023-12-25 DIAGNOSIS — S46.911A MUSCLE STRAIN OF RIGHT UPPER ARM, INITIAL ENCOUNTER: Primary | ICD-10-CM

## 2023-12-25 LAB
ALBUMIN SERPL-MCNC: 3.5 G/DL (ref 3.5–5.2)
ALP SERPL-CCNC: 48 U/L (ref 35–104)
ALT SERPL-CCNC: 22 U/L (ref 5–33)
ANION GAP SERPL CALCULATED.3IONS-SCNC: 10 MMOL/L (ref 9–17)
AST SERPL-CCNC: 23 U/L
BASOPHILS # BLD: 0 K/UL (ref 0–0.2)
BASOPHILS NFR BLD: 0 % (ref 0–2)
BILIRUB SERPL-MCNC: <0.2 MG/DL (ref 0.3–1.2)
BUN SERPL-MCNC: 7 MG/DL (ref 6–20)
CALCIUM SERPL-MCNC: 10 MG/DL (ref 8.6–10.4)
CHLORIDE SERPL-SCNC: 103 MMOL/L (ref 98–107)
CO2 SERPL-SCNC: 24 MMOL/L (ref 20–31)
CREAT SERPL-MCNC: 0.6 MG/DL (ref 0.5–0.9)
D DIMER PPP FEU-MCNC: 1.11 UG/ML FEU (ref 0–0.59)
EOSINOPHIL # BLD: 0.1 K/UL (ref 0–0.4)
EOSINOPHILS RELATIVE PERCENT: 1 % (ref 0–4)
ERYTHROCYTE [DISTWIDTH] IN BLOOD BY AUTOMATED COUNT: 14.1 % (ref 11.5–14.9)
GFR SERPL CREATININE-BSD FRML MDRD: >60 ML/MIN/1.73M2
GLUCOSE SERPL-MCNC: 77 MG/DL (ref 70–99)
HCT VFR BLD AUTO: 34.5 % (ref 36–46)
HGB BLD-MCNC: 11.6 G/DL (ref 12–16)
INR PPP: 1
LYMPHOCYTES NFR BLD: 2.8 K/UL (ref 1–4.8)
LYMPHOCYTES RELATIVE PERCENT: 27 % (ref 25–45)
MCH RBC QN AUTO: 28.6 PG (ref 26–34)
MCHC RBC AUTO-ENTMCNC: 33.5 G/DL (ref 31–37)
MCV RBC AUTO: 85.4 FL (ref 80–100)
MONOCYTES NFR BLD: 0.7 K/UL (ref 0.1–1.3)
MONOCYTES NFR BLD: 7 % (ref 2–8)
NEUTROPHILS NFR BLD: 65 % (ref 34–64)
NEUTS SEG NFR BLD: 6.6 K/UL (ref 1.3–9.1)
PARTIAL THROMBOPLASTIN TIME: 24.4 SEC (ref 24–36)
PLATELET # BLD AUTO: 241 K/UL (ref 150–450)
PMV BLD AUTO: 7.5 FL (ref 6–12)
POTASSIUM SERPL-SCNC: 3.8 MMOL/L (ref 3.7–5.3)
PROT SERPL-MCNC: 6.9 G/DL (ref 6.4–8.3)
PROTHROMBIN TIME: 13.2 SEC (ref 11.8–14.6)
RBC # BLD AUTO: 4.04 M/UL (ref 4–5.2)
SODIUM SERPL-SCNC: 137 MMOL/L (ref 135–144)
WBC OTHER # BLD: 10.2 K/UL (ref 4.5–13.5)

## 2023-12-25 PROCEDURE — 85025 COMPLETE CBC W/AUTO DIFF WBC: CPT

## 2023-12-25 PROCEDURE — 85730 THROMBOPLASTIN TIME PARTIAL: CPT

## 2023-12-25 PROCEDURE — 85379 FIBRIN DEGRADATION QUANT: CPT

## 2023-12-25 PROCEDURE — 36415 COLL VENOUS BLD VENIPUNCTURE: CPT

## 2023-12-25 PROCEDURE — 85610 PROTHROMBIN TIME: CPT

## 2023-12-25 PROCEDURE — 80053 COMPREHEN METABOLIC PANEL: CPT

## 2023-12-25 PROCEDURE — 99283 EMERGENCY DEPT VISIT LOW MDM: CPT

## 2023-12-26 NOTE — ED TRIAGE NOTES
Mode of arrival (squad #, walk in, police, etc) : walk in        Chief complaint(s): shoulder pain        Arrival Note (brief scenario, treatment PTA, etc). : pt reports having right shoulder pain since last week and today it has radiated down her right arm. Pt rates pain 10/10. Pt 15 weeks pregnant        C= \"Have you ever felt that you should Cut down on your drinking? \"  No  A= \"Have people Annoyed you by criticizing your drinking? \"  No  G= \"Have you ever felt bad or Guilty about your drinking? \"  No  E= \"Have you ever had a drink as an Eye-opener first thing in the morning to steady your nerves or to help a hangover? \"  No      Deferred []      Reason for deferring: N/A    *If yes to two or more: probable alcohol abuse. *

## 2023-12-26 NOTE — ED PROVIDER NOTES
EMERGENCY DEPARTMENT ENCOUNTER    Pt Name: Gladis Orozco  MRN: [id number]  [address] [place] 2000  Date of evaluation: 12/25/23  CHIEF COMPLAINT       Chief Complaint   Patient presents with    Shoulder Pain     right    Arm Pain     HISTORY OF PRESENT ILLNESS   61-year-old female who is pregnant at approximately 15 weeks gestation is presenting with chief complaint of shoulder pain and arm pain. Patient says she has had this shoulder pain for over 1 month but the pain has progressed down to her deltoid region prompting her visit. She denies any fever or chills. Denies any trauma or inciting event. She says that her deltoid region is more swollen than usual prompting her visit. She has a history of PE in the past and is concerned about a blood clot in her arm. She has had no recent travel, recent surgeries, immobilizations, diagnoses of malignancy, hemoptysis. The history is provided by the patient. REVIEW OF SYSTEMS     Review of Systems   Constitutional:  Negative for chills and fever. HENT:  Negative for congestion. Eyes:  Negative for visual disturbance. Respiratory:  Negative for cough and shortness of breath. Cardiovascular:  Negative for chest pain. Gastrointestinal:  Negative for abdominal pain, nausea and vomiting. Genitourinary:  Negative for dysuria, flank pain, frequency and urgency. Musculoskeletal:  Positive for arthralgias and joint swelling. Negative for myalgias. Neurological:  Negative for dizziness, light-headedness and headaches. Psychiatric/Behavioral:  Negative for behavioral problems.       PASTMEDICAL HISTORY     Past Medical History:   Diagnosis Date    Anxiety     Autism     Bipolar 1 disorder ([address] )     cHTN 12/07/2023    Deep vein thrombosis (HCC)     Depression     Depression with suicidal ideation 03/29/2023    Heart defect     Heart murmur     Migraine     PTSD (post-traumatic stress disorder)     Thyroid disease     pt unsure hyper or hypo     Past

## 2023-12-26 NOTE — TELEPHONE ENCOUNTER
Called patient to follow up and she states the  she is working with is off and will be in tomorrow.  She states she will call us tomorrow after speaking with them

## 2023-12-27 NOTE — PROGRESS NOTES
Attending Physician Statement  I have discussed the care of Kulwinder Leon, including pertinent history and exam findings,  with the resident. I have reviewed the key elements of all parts of the encounter with the resident. I agree with the assessment, plan and orders as documented by the resident.   (GE Modifier)    Karol Hermosillo, 
Penicillins Rash 2022    Vyvanse [lisdexamfetamine] Rash 2022       Social History:  Social History     Socioeconomic History    Marital status: Single     Spouse name: Not on file    Number of children: Not on file    Years of education: Not on file    Highest education level: Not on file   Occupational History    Not on file   Tobacco Use    Smoking status: Never    Smokeless tobacco: Never   Vaping Use    Vaping Use: Former    Quit date: 2023    Substances: Nicotine, Flavoring   Substance and Sexual Activity    Alcohol use: Not Currently    Drug use: Not Currently    Sexual activity: Not Currently     Partners: Male   Other Topics Concern    Not on file   Social History Narrative    Not on file     Social Determinants of Health     Financial Resource Strain: Not on file   Food Insecurity: Not on file   Transportation Needs: Not on file   Physical Activity: Not on file   Stress: Not on file   Social Connections: Not on file   Intimate Partner Violence: Not on file   Housing Stability: Not on file       Family History:  Family History   Problem Relation Age of Onset    Hypertension Mother     Heart Attack Father 39    Heart Disease Paternal Grandfather         de-fibulator    No Known Problems Half-Sister        Vitals:  BP: 129/73  Weight - Scale: 67.6 kg (149 lb)  Pulse: (!) 101  Albumin:  (Pt unable to void.)  Glucose:  (Pt unable to void.)  Fetal HR: 152     Physical Exam: Completed, See Epic Navigator   Chaperone for Intimate Exam: Chaperone was present for entire exam, Chaperone Name: Luis Raygoza[address] [place]:  Coralyn Fothergill is a 26 y.o. female  at 14w5d Initial Obstetrical Visit   - The patient was seen full history and physical was completed/reviewed.     - Prenatal labs reviewed, remainder ordered today    - GBS bacteriuria    - Prenatal vitamins prescription sent   - Aspirin indication: cHTN, Rx sent    - Problem list reviewed and updated   - First trimester screening and

## 2023-12-29 NOTE — TELEPHONE ENCOUNTER
Left message for a  from [facility] to call us back regarding this    Called and spoke with patient and she states she has not called [facility] regarding this because she has been too busy.  I stressed the importance of this to her and her need for daily injections.  She states she is still not having them.  I requested her to please be in contact with us and them so we can help facilitate these and assist in any way possible to make sure she gets her daily shots.

## 2024-01-02 ENCOUNTER — TELEPHONE (OUTPATIENT)
Dept: OBGYN | Age: 24
End: 2024-01-02

## 2024-01-02 NOTE — TELEPHONE ENCOUNTER
Patient called the office requesting her records be faxed to [facility]. Patient states she is unhappy with [facility].    Patient was informed she will need to come in and sign a release of records.

## 2024-01-03 NOTE — TELEPHONE ENCOUNTER
Spoke with the [facility] in which she is staying and they state they are not helping her get a home health aide.  They state that they will reiterate this to her

## 2024-01-04 ENCOUNTER — HOSPITAL ENCOUNTER (OUTPATIENT)
Dept: VASCULAR LAB | Age: 24
Discharge: HOME | End: 2024-01-06
Attending: EMERGENCY MEDICINE
Payer: COMMERCIAL

## 2024-01-04 DIAGNOSIS — R79.89 ELEVATED D-DIMER: ICD-10-CM

## 2024-01-04 PROBLEM — R87.619 ABNORMAL PAP SMEAR OF CERVIX: Status: ACTIVE | Noted: 2024-01-04

## 2024-01-04 PROCEDURE — 93971 EXTREMITY STUDY: CPT

## 2024-01-04 PROCEDURE — 93971 EXTREMITY STUDY: CPT | Performed by: SURGERY

## 2024-01-05 NOTE — TELEPHONE ENCOUNTER
See other phone encounter- patient states she will be [facility]- release of records has been signed

## 2024-02-01 ENCOUNTER — APPOINTMENT (OUTPATIENT)
Dept: CT IMAGING | Age: 24
End: 2024-02-01
Payer: COMMERCIAL

## 2024-02-01 ENCOUNTER — HOSPITAL ENCOUNTER (EMERGENCY)
Age: 24
Discharge: HOME | End: 2024-02-02
Attending: EMERGENCY MEDICINE
Payer: COMMERCIAL

## 2024-02-01 VITALS
OXYGEN SATURATION: 99 % | HEART RATE: 72 BPM | SYSTOLIC BLOOD PRESSURE: 109 MMHG | TEMPERATURE: 97.8 F | DIASTOLIC BLOOD PRESSURE: 67 MMHG | RESPIRATION RATE: 18 BRPM

## 2024-02-01 DIAGNOSIS — R07.9 CHEST PAIN, UNSPECIFIED TYPE: Primary | ICD-10-CM

## 2024-02-01 PROBLEM — Z3A.14 14 WEEKS GESTATION OF PREGNANCY: Status: RESOLVED | Noted: 2023-12-18 | Resolved: 2024-02-01

## 2024-02-01 LAB
ALBUMIN SERPL-MCNC: 3.8 G/DL (ref 3.5–5.2)
ALBUMIN/GLOB SERPL: 1.3 {RATIO} (ref 1–2.5)
ALP SERPL-CCNC: 50 U/L (ref 35–104)
ALT SERPL-CCNC: 15 U/L (ref 5–33)
ANION GAP SERPL CALCULATED.3IONS-SCNC: 10 MMOL/L (ref 9–17)
AST SERPL-CCNC: 18 U/L
BACTERIA URNS QL MICRO: ABNORMAL
BASOPHILS # BLD: 0.03 K/UL (ref 0–0.2)
BASOPHILS NFR BLD: 0 % (ref 0–2)
BILIRUB SERPL-MCNC: 0.2 MG/DL (ref 0.3–1.2)
BILIRUB UR QL STRIP: NEGATIVE
BUN SERPL-MCNC: 7 MG/DL (ref 6–20)
CALCIUM SERPL-MCNC: 10.9 MG/DL (ref 8.6–10.4)
CASTS #/AREA URNS LPF: ABNORMAL /LPF (ref 0–8)
CHLORIDE SERPL-SCNC: 104 MMOL/L (ref 98–107)
CLARITY UR: ABNORMAL
CO2 SERPL-SCNC: 22 MMOL/L (ref 20–31)
COLOR UR: YELLOW
CREAT SERPL-MCNC: 0.4 MG/DL (ref 0.5–0.9)
EOSINOPHIL # BLD: 0.06 K/UL (ref 0–0.44)
EOSINOPHILS RELATIVE PERCENT: 1 % (ref 1–4)
EPI CELLS #/AREA URNS HPF: ABNORMAL /HPF (ref 0–5)
ERYTHROCYTE [DISTWIDTH] IN BLOOD BY AUTOMATED COUNT: 13.1 % (ref 11.8–14.4)
GFR SERPL CREATININE-BSD FRML MDRD: >60 ML/MIN/1.73M2
GLUCOSE SERPL-MCNC: 80 MG/DL (ref 70–99)
GLUCOSE UR STRIP-MCNC: NEGATIVE MG/DL
HCT VFR BLD AUTO: 35.6 % (ref 36.3–47.1)
HGB BLD-MCNC: 11.6 G/DL (ref 11.9–15.1)
HGB UR QL STRIP.AUTO: NEGATIVE
IMM GRANULOCYTES # BLD AUTO: 0.1 K/UL (ref 0–0.3)
IMM GRANULOCYTES NFR BLD: 1 %
KETONES UR STRIP-MCNC: NEGATIVE MG/DL
LACTIC ACID, WHOLE BLOOD: 0.9 MMOL/L (ref 0.7–2.1)
LEUKOCYTE ESTERASE UR QL STRIP: ABNORMAL
LIPASE SERPL-CCNC: 19 U/L (ref 13–60)
LYMPHOCYTES NFR BLD: 2.9 K/UL (ref 1.1–3.7)
LYMPHOCYTES RELATIVE PERCENT: 24 % (ref 25–45)
MCH RBC QN AUTO: 28.9 PG (ref 25.2–33.5)
MCHC RBC AUTO-ENTMCNC: 32.6 G/DL (ref 28.4–34.8)
MCV RBC AUTO: 88.8 FL (ref 82.6–102.9)
MONOCYTES NFR BLD: 0.67 K/UL (ref 0.1–1.4)
MONOCYTES NFR BLD: 6 % (ref 2–8)
NEUTROPHILS NFR BLD: 68 % (ref 34–64)
NEUTS SEG NFR BLD: 8.34 K/UL (ref 1.5–8.1)
NITRITE UR QL STRIP: NEGATIVE
NRBC BLD-RTO: 0 PER 100 WBC
PH UR STRIP: 7.5 [PH] (ref 5–8)
PLATELET # BLD AUTO: 279 K/UL (ref 138–453)
PMV BLD AUTO: 9.7 FL (ref 8.1–13.5)
POTASSIUM SERPL-SCNC: 3.5 MMOL/L (ref 3.7–5.3)
PROT SERPL-MCNC: 6.7 G/DL (ref 6.4–8.3)
PROT UR STRIP-MCNC: NEGATIVE MG/DL
RBC # BLD AUTO: 4.01 M/UL (ref 3.95–5.11)
RBC #/AREA URNS HPF: ABNORMAL /HPF (ref 0–4)
SODIUM SERPL-SCNC: 136 MMOL/L (ref 135–144)
SP GR UR STRIP: 1.01 (ref 1–1.03)
TROPONIN I SERPL HS-MCNC: <6 NG/L (ref 0–14)
UROBILINOGEN UR STRIP-ACNC: NORMAL EU/DL (ref 0–1)
WBC #/AREA URNS HPF: ABNORMAL /HPF (ref 0–5)
WBC OTHER # BLD: 12.1 K/UL (ref 4.5–13.5)

## 2024-02-01 PROCEDURE — 83690 ASSAY OF LIPASE: CPT

## 2024-02-01 PROCEDURE — 81001 URINALYSIS AUTO W/SCOPE: CPT

## 2024-02-01 PROCEDURE — 6370000000 HC RX 637 (ALT 250 FOR IP): Performed by: EMERGENCY MEDICINE

## 2024-02-01 PROCEDURE — 93005 ELECTROCARDIOGRAM TRACING: CPT

## 2024-02-01 PROCEDURE — 84484 ASSAY OF TROPONIN QUANT: CPT

## 2024-02-01 PROCEDURE — 99285 EMERGENCY DEPT VISIT HI MDM: CPT

## 2024-02-01 PROCEDURE — 80053 COMPREHEN METABOLIC PANEL: CPT

## 2024-02-01 PROCEDURE — 71260 CT THORAX DX C+: CPT

## 2024-02-01 PROCEDURE — 85025 COMPLETE CBC W/AUTO DIFF WBC: CPT

## 2024-02-01 PROCEDURE — 6360000004 HC RX CONTRAST MEDICATION

## 2024-02-01 PROCEDURE — 83605 ASSAY OF LACTIC ACID: CPT

## 2024-02-01 RX ORDER — MORPHINE SULFATE 4 MG/ML
4 INJECTION, SOLUTION INTRAMUSCULAR; INTRAVENOUS ONCE
Status: DISCONTINUED | OUTPATIENT
Start: 2024-02-01 | End: 2024-02-01

## 2024-02-01 RX ORDER — CEPHALEXIN 500 MG/1
500 CAPSULE ORAL ONCE
Status: DISCONTINUED | OUTPATIENT
Start: 2024-02-02 | End: 2024-02-02 | Stop reason: HOSPADM

## 2024-02-01 RX ORDER — ACETAMINOPHEN 500 MG
1000 TABLET ORAL ONCE
Status: COMPLETED | OUTPATIENT
Start: 2024-02-01 | End: 2024-02-01

## 2024-02-01 RX ADMIN — IOPAMIDOL 75 ML: 755 INJECTION, SOLUTION INTRAVENOUS at 22:16

## 2024-02-01 RX ADMIN — ACETAMINOPHEN 1000 MG: 500 TABLET ORAL at 22:33

## 2024-02-01 ASSESSMENT — ENCOUNTER SYMPTOMS
ABDOMINAL PAIN: 1
NAUSEA: 0
SHORTNESS OF BREATH: 0
VOMITING: 0
DIARRHEA: 0

## 2024-02-01 ASSESSMENT — PAIN SCALES - GENERAL: PAINLEVEL_OUTOF10: 10

## 2024-02-01 ASSESSMENT — PAIN DESCRIPTION - LOCATION: LOCATION: ABDOMEN

## 2024-02-02 NOTE — ED TRIAGE NOTES
Pt comes to ED with c/o chest pain and abd pain. Pt states pain started this morning, mid sternal radiating to left side, unsure of relieving or aggravating factors, is 5 month pregnant. Pt denies dysuria, polyuria, vaginal discharge, MI hx, nausea, emesis, fever, pregnancy complications, vision or hearing changes. Pt a/o x4, resting on stretcher, attached to monitor, RR even and non labored, call light within reach.

## 2024-02-02 NOTE — CONSULTS
capsule by mouth 2 times daily (before meals) (Patient not taking: Reported on 12/6/2023) 180 capsule 1       FAMILY HISTORY:  family history includes Heart Attack (age of onset: 41) in her father; Heart Disease in her paternal grandfather; Hypertension in her mother; No Known Problems in her half-sister.    SOCIAL HISTORY:   reports that she has never smoked. She has never used smokeless tobacco. She reports that she does not currently use alcohol. She reports that she does not currently use drugs.  ________________________________________________________________________                                    VITALS:  Vitals:    02/01/24 2010   BP: 109/67   Pulse: 72   Resp: 18   Temp: 97.8 °F (36.6 °C)   SpO2: 99%                                                                                                                                                                               PHYSICAL EXAM:     General Appearance: Appears healthy.  Alert; in no acute distress.  Pleasant.  Respiratory: Normal respiratory effort, no conversational dyspnea  Cardiovascular: Regular rate  Abdomen: soft, gravid, nondistended, nontender  Pelvic Exam: deferred  Musculoskeletal: no gross abnormalities  Psych:  mood and affect are within normal limits       LAB RESULTS:  Results for orders placed or performed during the hospital encounter of 02/01/24   CBC with Auto Differential   Result Value Ref Range    WBC 12.1 4.5 - 13.5 k/uL    RBC 4.01 3.95 - 5.11 m/uL    Hemoglobin 11.6 (L) 11.9 - 15.1 g/dL    Hematocrit 35.6 (L) 36.3 - 47.1 %    MCV 88.8 82.6 - 102.9 fL    MCH 28.9 25.2 - 33.5 pg    MCHC 32.6 28.4 - 34.8 g/dL    RDW 13.1 11.8 - 14.4 %    Platelets 279 138 - 453 k/uL    MPV 9.7 8.1 - 13.5 fL    NRBC Automated 0.0 0.0 per 100 WBC    Neutrophils % 68 (H) 34 - 64 %    Lymphocytes % 24 (L) 25 - 45 %    Monocytes % 6 2 - 8 %    Eosinophils % 1 1 - 4 %    Basophils % 0 0 - 2 %    Immature Granulocytes 1 (H) 0 %    Neutrophils

## 2024-02-02 NOTE — ED NOTES
SW consulted for pt needing assistance or possibly home health for daily Lovenox shots. Pt reports she has had someone teach her how to self administer shots in the past but that she just can't do it. Pt provided current phone number for case management to follow up with pt tomorrow to see if pt has any options or qualifies for services to assist in this matter. Message was left with case management informing of pt's needs. Pt provided contact information as [phone].

## 2024-02-02 NOTE — ED PROVIDER NOTES
MD Carlota   melatonin (RA MELATONIN) 3 MG TABS tablet Take 1 tablet by mouth daily  Patient not taking: Reported on 12/6/2023 4/19/23   Aramis Palafox MD   omeprazole (PRILOSEC) 20 MG delayed release capsule Take 1 capsule by mouth 2 times daily (before meals)  Patient not taking: Reported on 12/6/2023 4/19/23   Aramis Palafox MD         REVIEW OF SYSTEMS       Review of Systems   Constitutional:  Negative for chills and fever.   Respiratory:  Negative for shortness of breath.    Cardiovascular:  Positive for chest pain.   Gastrointestinal:  Positive for abdominal pain. Negative for diarrhea, nausea and vomiting.   Genitourinary:  Negative for difficulty urinating.   Neurological:  Negative for weakness and headaches.       PHYSICAL EXAM      INITIAL VITALS:   /67   Pulse 72   Temp 97.8 °F (36.6 °C)   Resp 18   LMP 08/01/2023 (Exact Date)   SpO2 99%     Physical Exam  Constitutional:       Appearance: She is well-developed.   Cardiovascular:      Rate and Rhythm: Normal rate and regular rhythm.      Heart sounds: Normal heart sounds.   Pulmonary:      Effort: Pulmonary effort is normal.      Breath sounds: Normal breath sounds.   Abdominal:      General: Abdomen is flat.      Palpations: Abdomen is soft.      Tenderness: There is generalized abdominal tenderness. There is no right CVA tenderness, left CVA tenderness, guarding or rebound. Negative signs include McBurney's sign.   Skin:     General: Skin is warm and dry.      Capillary Refill: Capillary refill takes less than 2 seconds.   Neurological:      General: No focal deficit present.      Mental Status: She is alert.           DDX/DIAGNOSTIC RESULTS / EMERGENCY DEPARTMENT COURSE / MDM     Medical Decision Making  Marie Golden is a 23 y.o. female who presents with chest pain, abdominal pain, 20 weeks pregnant.  Patient is GCS 15, nontoxic appearing, not in acute distress, speaking full sentences, able to ambulate under their own power.         PROCEDURES:      CONSULTS:  IP CONSULT TO OB GYN    CRITICAL CARE:  There was significant risk of life threatening deterioration of patient's condition requiring my direct management. Critical care time  minutes, excluding any documented procedures.    FINAL IMPRESSION      1. Chest pain, unspecified type          DISPOSITION / PLAN     DISPOSITION Decision To Discharge 02/02/2024 12:24:39 AM      PATIENT REFERRED TO:  Arnaud Higuera MD  [facility], [address] [id number]  [phone]    Schedule an appointment as soon as possible for a visit in 3 days        DISCHARGE MEDICATIONS:  New Prescriptions    No medications on file       Jeramy Valencia MD  Emergency Medicine Resident    (Please note that portions of this note were completed with a voice recognition program.  Efforts were made to edit the dictations but occasionally words are mis-transcribed.)

## 2024-02-02 NOTE — ED PROVIDER NOTES
[facility]     Emergency Department     Faculty Attestation  10:15 PM EST      I performed a history and physical examination of the patient and discussed management with the resident. I have reviewed and agree with the resident’s findings including all diagnostic interpretations, and treatment plans as written. Any areas of disagreement are noted on the chart. I was personally present for the key portions of any procedures. I have documented in the chart those procedures where I was not present during the key portions. I have reviewed the emergency nurses triage note. I agree with the chief complaint, past medical history, past surgical history, allergies, medications, social and family history as documented unless otherwise noted below. Documentation of the HPI, Physical Exam and Medical Decision Making performed by scribyesi is based on my personal performance of the HPI, PE and MDM. For Physician Assistant/ Nurse Practitioner cases/documentation I have personally evaluated this patient and have completed at least one if not all key elements of the E/M (history, physical exam, and MDM). Additional findings are as noted.    PAtient is pregnant and overally poor historian, seems to have some mental disability/delay, c/o left sided chest pain and left sided abdomial pain, she is 20 weeks pregnant, feeling baby move, and no vaginal bleeding, accompanied by a 59 yo male who she is living with at this time, has h/o being homeless, also h/o PE, and is supposed to be on lovenox, but is \"unable to give it to herself\" and he friend is \"uncomfortable with giving it to her\", so has not been taking it for a few weeks.  Patient also reports vomiting earlier today, and unable to eat today.    Bedside US shows active fetus with FHT at 160.  Patient with tenderness diffusely in the abdomen, non localizing, and non peritoneal  No reps distress noted, lungs are clear to

## 2024-02-02 NOTE — DISCHARGE INSTRUCTIONS
You were seen today in the emergency department for your chest pain.   We now feel you are safe for discharge home.    Please return to the emergency department immediately if develop any new or worsening concerns including chest pain, shortness of breath, abdominal pain, nausea, vomiting, diarrhea, weakness, loss consciousness, fever, chills, or any other concerns.    Please call your PCP and schedule appointment within the next 24 to 48 hours for follow-up.

## 2024-02-04 LAB
EKG ATRIAL RATE: 83 BPM
EKG P AXIS: 53 DEGREES
EKG P-R INTERVAL: 174 MS
EKG Q-T INTERVAL: 332 MS
EKG QRS DURATION: 78 MS
EKG QTC CALCULATION (BAZETT): 390 MS
EKG R AXIS: 63 DEGREES
EKG T AXIS: 35 DEGREES
EKG VENTRICULAR RATE: 83 BPM

## 2024-03-27 DIAGNOSIS — Z59.00 HOMELESSNESS: ICD-10-CM

## 2024-03-27 DIAGNOSIS — O21.9 NAUSEA AND VOMITING DURING PREGNANCY: ICD-10-CM

## 2024-03-27 DIAGNOSIS — Z3A.13 13 WEEKS GESTATION OF PREGNANCY: ICD-10-CM

## 2024-03-27 RX ORDER — ONDANSETRON 4 MG/1
4 TABLET, FILM COATED ORAL EVERY 8 HOURS PRN
Qty: 30 TABLET | Refills: 2 | OUTPATIENT
Start: 2024-03-27

## 2024-03-27 SDOH — ECONOMIC STABILITY - HOUSING INSECURITY: HOMELESSNESS UNSPECIFIED: Z59.00

## 2024-04-01 ENCOUNTER — HOSPITAL ENCOUNTER (OUTPATIENT)
Age: 24
Setting detail: OBSERVATION
Discharge: HOME | End: 2024-04-01
Attending: OBSTETRICS & GYNECOLOGY | Admitting: OBSTETRICS & GYNECOLOGY
Payer: COMMERCIAL

## 2024-04-01 VITALS
HEIGHT: 66 IN | RESPIRATION RATE: 16 BRPM | OXYGEN SATURATION: 99 % | SYSTOLIC BLOOD PRESSURE: 92 MMHG | TEMPERATURE: 97.8 F | BODY MASS INDEX: 24.21 KG/M2 | DIASTOLIC BLOOD PRESSURE: 47 MMHG | HEART RATE: 100 BPM

## 2024-04-01 PROBLEM — Z3A.29 29 WEEKS GESTATION OF PREGNANCY: Status: ACTIVE | Noted: 2024-04-01

## 2024-04-01 LAB
ALBUMIN SERPL-MCNC: 3.7 G/DL (ref 3.5–5.2)
ALBUMIN/GLOB SERPL: 1 {RATIO} (ref 1–2.5)
ALP SERPL-CCNC: 73 U/L (ref 35–104)
ALT SERPL-CCNC: 16 U/L (ref 10–35)
ANION GAP SERPL CALCULATED.3IONS-SCNC: 9 MMOL/L (ref 9–16)
AST SERPL-CCNC: 23 U/L (ref 10–35)
BACTERIA URNS QL MICRO: NORMAL
BASOPHILS # BLD: 0.03 K/UL (ref 0–0.2)
BASOPHILS NFR BLD: 0 % (ref 0–2)
BILIRUB SERPL-MCNC: 0.2 MG/DL (ref 0–1.2)
BILIRUB UR QL STRIP: NEGATIVE
BUN SERPL-MCNC: 8 MG/DL (ref 6–20)
C TRACH DNA SPEC QL PROBE+SIG AMP: ABNORMAL
CALCIUM SERPL-MCNC: 10.3 MG/DL (ref 8.6–10.4)
CANDIDA SPECIES: NEGATIVE
CASTS #/AREA URNS LPF: NORMAL /LPF (ref 0–8)
CHLORIDE SERPL-SCNC: 106 MMOL/L (ref 98–107)
CLARITY UR: CLEAR
CO2 SERPL-SCNC: 24 MMOL/L (ref 20–31)
COLOR UR: YELLOW
CREAT SERPL-MCNC: 0.4 MG/DL (ref 0.5–0.9)
CREAT UR-MCNC: 40.9 MG/DL (ref 28–217)
EOSINOPHIL # BLD: 0.08 K/UL (ref 0–0.44)
EOSINOPHILS RELATIVE PERCENT: 1 % (ref 1–4)
EPI CELLS #/AREA URNS HPF: NORMAL /HPF (ref 0–5)
ERYTHROCYTE [DISTWIDTH] IN BLOOD BY AUTOMATED COUNT: 12.8 % (ref 11.8–14.4)
GARDNERELLA VAGINALIS: NEGATIVE
GFR SERPL CREATININE-BSD FRML MDRD: >90 ML/MIN/1.73M2
GLUCOSE SERPL-MCNC: 101 MG/DL (ref 74–99)
GLUCOSE UR STRIP-MCNC: NEGATIVE MG/DL
HCT VFR BLD AUTO: 31.9 % (ref 36.3–47.1)
HGB BLD-MCNC: 10.7 G/DL (ref 11.9–15.1)
HGB UR QL STRIP.AUTO: NEGATIVE
IMM GRANULOCYTES # BLD AUTO: 0.11 K/UL (ref 0–0.3)
IMM GRANULOCYTES NFR BLD: 1 %
KETONES UR STRIP-MCNC: NEGATIVE MG/DL
LEUKOCYTE ESTERASE UR QL STRIP: ABNORMAL
LIPASE SERPL-CCNC: 21 U/L (ref 13–60)
LYMPHOCYTES NFR BLD: 2.8 K/UL (ref 1.1–3.7)
LYMPHOCYTES RELATIVE PERCENT: 23 % (ref 25–45)
MCH RBC QN AUTO: 28.9 PG (ref 25.2–33.5)
MCHC RBC AUTO-ENTMCNC: 33.5 G/DL (ref 28.4–34.8)
MCV RBC AUTO: 86.2 FL (ref 82.6–102.9)
MONOCYTES NFR BLD: 0.74 K/UL (ref 0.1–1.4)
MONOCYTES NFR BLD: 6 % (ref 2–8)
N GONORRHOEA DNA SPEC QL PROBE+SIG AMP: NEGATIVE
NEUTROPHILS NFR BLD: 69 % (ref 34–64)
NEUTS SEG NFR BLD: 8.54 K/UL (ref 1.5–8.1)
NITRITE UR QL STRIP: NEGATIVE
NRBC BLD-RTO: 0 PER 100 WBC
PH UR STRIP: 7.5 [PH] (ref 5–8)
PLATELET # BLD AUTO: 258 K/UL (ref 138–453)
PMV BLD AUTO: 10.1 FL (ref 8.1–13.5)
POTASSIUM SERPL-SCNC: 3.8 MMOL/L (ref 3.7–5.3)
PROT SERPL-MCNC: 6.5 G/DL (ref 6.6–8.7)
PROT UR STRIP-MCNC: NEGATIVE MG/DL
RBC # BLD AUTO: 3.7 M/UL (ref 3.95–5.11)
RBC #/AREA URNS HPF: NORMAL /HPF (ref 0–4)
SODIUM SERPL-SCNC: 139 MMOL/L (ref 136–145)
SOURCE: NORMAL
SP GR UR STRIP: 1.01 (ref 1–1.03)
SPECIMEN DESCRIPTION: ABNORMAL
TOTAL PROTEIN, URINE: <4 MG/DL
TRICHOMONAS: NEGATIVE
URINE TOTAL PROTEIN CREATININE RATIO: NORMAL
UROBILINOGEN UR STRIP-ACNC: NORMAL EU/DL (ref 0–1)
WBC #/AREA URNS HPF: NORMAL /HPF (ref 0–5)
WBC OTHER # BLD: 12.3 K/UL (ref 4.5–13.5)

## 2024-04-01 PROCEDURE — 96374 THER/PROPH/DIAG INJ IV PUSH: CPT

## 2024-04-01 PROCEDURE — 6370000000 HC RX 637 (ALT 250 FOR IP)

## 2024-04-01 PROCEDURE — 87660 TRICHOMONAS VAGIN DIR PROBE: CPT

## 2024-04-01 PROCEDURE — 85025 COMPLETE CBC W/AUTO DIFF WBC: CPT

## 2024-04-01 PROCEDURE — 96375 TX/PRO/DX INJ NEW DRUG ADDON: CPT

## 2024-04-01 PROCEDURE — 96361 HYDRATE IV INFUSION ADD-ON: CPT

## 2024-04-01 PROCEDURE — 99204 OFFICE O/P NEW MOD 45 MIN: CPT

## 2024-04-01 PROCEDURE — 84156 ASSAY OF PROTEIN URINE: CPT

## 2024-04-01 PROCEDURE — 87591 N.GONORRHOEAE DNA AMP PROB: CPT

## 2024-04-01 PROCEDURE — 2580000003 HC RX 258

## 2024-04-01 PROCEDURE — 6360000002 HC RX W HCPCS: Performed by: STUDENT IN AN ORGANIZED HEALTH CARE EDUCATION/TRAINING PROGRAM

## 2024-04-01 PROCEDURE — 80053 COMPREHEN METABOLIC PANEL: CPT

## 2024-04-01 PROCEDURE — 83690 ASSAY OF LIPASE: CPT

## 2024-04-01 PROCEDURE — 81001 URINALYSIS AUTO W/SCOPE: CPT

## 2024-04-01 PROCEDURE — 6360000002 HC RX W HCPCS

## 2024-04-01 PROCEDURE — G0378 HOSPITAL OBSERVATION PER HR: HCPCS

## 2024-04-01 PROCEDURE — 82570 ASSAY OF URINE CREATININE: CPT

## 2024-04-01 PROCEDURE — 6370000000 HC RX 637 (ALT 250 FOR IP): Performed by: STUDENT IN AN ORGANIZED HEALTH CARE EDUCATION/TRAINING PROGRAM

## 2024-04-01 PROCEDURE — 87480 CANDIDA DNA DIR PROBE: CPT

## 2024-04-01 PROCEDURE — 87491 CHLMYD TRACH DNA AMP PROBE: CPT

## 2024-04-01 PROCEDURE — 87510 GARDNER VAG DNA DIR PROBE: CPT

## 2024-04-01 RX ORDER — ONDANSETRON 2 MG/ML
4 INJECTION INTRAMUSCULAR; INTRAVENOUS EVERY 6 HOURS PRN
Status: DISCONTINUED | OUTPATIENT
Start: 2024-04-01 | End: 2024-04-01 | Stop reason: HOSPADM

## 2024-04-01 RX ORDER — 0.9 % SODIUM CHLORIDE 0.9 %
500 INTRAVENOUS SOLUTION INTRAVENOUS ONCE
Status: COMPLETED | OUTPATIENT
Start: 2024-04-01 | End: 2024-04-01

## 2024-04-01 RX ORDER — ACETAMINOPHEN 500 MG
1000 TABLET ORAL EVERY 8 HOURS SCHEDULED
Status: DISCONTINUED | OUTPATIENT
Start: 2024-04-01 | End: 2024-04-01 | Stop reason: HOSPADM

## 2024-04-01 RX ORDER — CYCLOBENZAPRINE HCL 10 MG
10 TABLET ORAL ONCE
Status: COMPLETED | OUTPATIENT
Start: 2024-04-01 | End: 2024-04-01

## 2024-04-01 RX ORDER — METOCLOPRAMIDE HYDROCHLORIDE 5 MG/ML
10 INJECTION INTRAMUSCULAR; INTRAVENOUS ONCE
Status: COMPLETED | OUTPATIENT
Start: 2024-04-01 | End: 2024-04-01

## 2024-04-01 RX ORDER — ONDANSETRON 4 MG/1
4 TABLET, FILM COATED ORAL DAILY PRN
Qty: 30 TABLET | Refills: 0 | Status: SHIPPED | OUTPATIENT
Start: 2024-04-01

## 2024-04-01 RX ORDER — DIPHENHYDRAMINE HYDROCHLORIDE 50 MG/ML
25 INJECTION INTRAMUSCULAR; INTRAVENOUS ONCE
Status: COMPLETED | OUTPATIENT
Start: 2024-04-01 | End: 2024-04-01

## 2024-04-01 RX ORDER — ONDANSETRON 4 MG/1
4 TABLET, ORALLY DISINTEGRATING ORAL EVERY 8 HOURS PRN
Status: DISCONTINUED | OUTPATIENT
Start: 2024-04-01 | End: 2024-04-01 | Stop reason: HOSPADM

## 2024-04-01 RX ORDER — PROCHLORPERAZINE EDISYLATE 5 MG/ML
10 INJECTION INTRAMUSCULAR; INTRAVENOUS ONCE
Status: COMPLETED | OUTPATIENT
Start: 2024-04-01 | End: 2024-04-01

## 2024-04-01 RX ORDER — ACETAMINOPHEN 500 MG
1000 TABLET ORAL EVERY 8 HOURS SCHEDULED
Status: DISCONTINUED | OUTPATIENT
Start: 2024-04-01 | End: 2024-04-01

## 2024-04-01 RX ADMIN — DIPHENHYDRAMINE HYDROCHLORIDE 25 MG: 50 INJECTION, SOLUTION INTRAMUSCULAR; INTRAVENOUS at 03:32

## 2024-04-01 RX ADMIN — PROCHLORPERAZINE EDISYLATE 10 MG: 5 INJECTION INTRAMUSCULAR; INTRAVENOUS at 09:48

## 2024-04-01 RX ADMIN — ACETAMINOPHEN 1000 MG: 500 TABLET ORAL at 03:40

## 2024-04-01 RX ADMIN — SODIUM CHLORIDE 500 ML: 9 INJECTION, SOLUTION INTRAVENOUS at 03:31

## 2024-04-01 RX ADMIN — METOCLOPRAMIDE 10 MG: 5 INJECTION, SOLUTION INTRAMUSCULAR; INTRAVENOUS at 03:32

## 2024-04-01 RX ADMIN — CYCLOBENZAPRINE 10 MG: 10 TABLET, FILM COATED ORAL at 09:48

## 2024-04-01 RX ADMIN — ONDANSETRON 4 MG: 2 INJECTION INTRAMUSCULAR; INTRAVENOUS at 03:32

## 2024-04-01 ASSESSMENT — PAIN DESCRIPTION - LOCATION: LOCATION: ABDOMEN

## 2024-04-01 NOTE — DISCHARGE INSTRUCTIONS
Notify Physician for:    *  Regular contractions that are  5 minutes apart or less lasting longer than 1 hr for 1st baby, 10 mins apart or less if 2nd baby or greater.    *  Leaking or gush of fluid from vagina.    *  Any vaginal bleeding that is heavier than a menstrual period.    *  Decrease or absence of baby movement.     *  Vaginal pressure, low backache.
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
## 2024-04-01 NOTE — FLOWSHEET NOTE
Pt presents to L and D, via wheelchair.  Pt here for lower abd pain and tenderness, pt denies RUQ tenderness.  Pt also complains of headache, visual change, and nausea. Pt gowned and in bed, oriented to room and call light.  EFM explained and applied.   T's 128.   Dr. Turcios aware of admission and pt complaints.

## 2024-04-01 NOTE — PROGRESS NOTES
Obstetric/Gynecology Resident Interval Note    Patient reevaluated. She has just finished eating her lunch tray and is asleep in bed. Patient woke up and reports that her abdominal pain and nausea has resolved. She is requesting discharge. Tracing has remained category 1. Will send patient zofran to home pharmacy.     Patient will be discharged home. Patient counseled on  labor precautions, pre-eclampsia signs and symptoms, PO hydration, and fetal kick counts. Patient was instructed to return to the hospital if experiencing leakage of fluid, vaginal bleeding or decreased fetal movement. Also advised patient to come to the hospital if experiencing unrelenting headache, vision changes, shortness of breath, RUQ pain, nausea/vomiting or worsening peripheral edema.  All questions and concerns were addressed and patient expressed understanding. Patient advised to follow up in the office for next appointment.    Senior resident and attending updated and in agreement with plan.     Paulina Cronin DO  OB/GYN Resident, PGY1  [place], [place]  2024, 1:01 PM

## 2024-04-01 NOTE — H&P
lacerations or abnormal lesions visualized    LIMITED BEDSIDE US:  Position: BREECH  Placental Location: posterior  Fetal Heart Tones: Present  Fetal Movement: Present   Amniotic Fluid Index/Volume:  adequate 2x2 cm fluid pocket      PRENATAL LAB RESULTS:   Blood Type/Rh: A pos  Antibody Screen: negative  Hemoglobin, Hematocrit, Platelets: 12.2/37.2/257  Rubella: equivocal  T. Pallidum, IgG: non-reactive  Hepatitis B Surface Antigen: non-reactive   Hepatitis C Antibody: non-reactive   HIV: non-reactive   Gonorrhea: negative  Chlamydia: negative  Urine culture: positive - Proteus 10 to 50,000 CFU AND GBS Bacteruria 10 to 58031 CFU, date: 23; negative 23    1 hour Glucose Tolerance Test: not done    Group B Strep: positive on 24; positive GBS bacteruria on 24  Cystic Fibrosis Screen: not available  Sickle Cell Screen: not available  First Trimester Screen: low risk for aneuploidy  QUAD: not done  MSAFP: not done  Non-Invasive Prenatal Testing: not done  Anatomy US: posterior placenta, 3VC, male gender, normal anatomy per [facility] anatomy US     ASSESSMENT & PLAN:  Marie Golden is a 23 y.o. female  at 29w5d    - GBS positive / Rh positive / R equivocal   - No indication for GBS prophylaxis at this time     Abdominal Pain    - Patient reports abdominal pain over the last hour; 10/10. Has not attempted anything for pain relief. Denies any aggravating or alleviating factors.    - VSS, Afebrile   - cEFM/TOCO showing category I tracing and no contractions   - CBC, CMP, P/C, Lipase ordered on admission   - BSUS: breech fetus, posterior placenta without signs of abruption. Adequate fetal movements.    - Physical examination: soft gravid abdomen, no scars. Pelvic examination reveals closed cervix and copious discharge in posterior vaginal vault    - Vaginitis, GC/C, UA ordered    - Tylenol ordered 1g q 6hr    - IV inserted   - IVF bolus  ml bolus ordered    - NPO with plans to transition to

## 2024-04-02 ENCOUNTER — TELEPHONE (OUTPATIENT)
Dept: FAMILY MEDICINE CLINIC | Age: 24
End: 2024-04-02

## 2024-04-02 NOTE — TELEPHONE ENCOUNTER
Care Transitions Initial Follow Up Call    Outreach made within 2 business days of discharge: Yes    Patient: Marie Golden Patient : 2000   MRN: [id number]  Reason for Admission: There are no discharge diagnoses documented for the most recent discharge.  Discharge Date: 24       Spoke with: patient is no longer a patient within [facility]. Patient would not like to receive any further calls to schedule appts.     Discharge department/facility: [facility]

## 2024-04-05 ENCOUNTER — HOSPITAL ENCOUNTER (OUTPATIENT)
Age: 24
Discharge: HOME | End: 2024-04-05
Attending: OBSTETRICS & GYNECOLOGY | Admitting: OBSTETRICS & GYNECOLOGY
Payer: COMMERCIAL

## 2024-04-05 VITALS
TEMPERATURE: 98.2 F | HEART RATE: 98 BPM | SYSTOLIC BLOOD PRESSURE: 115 MMHG | RESPIRATION RATE: 16 BRPM | DIASTOLIC BLOOD PRESSURE: 61 MMHG | OXYGEN SATURATION: 98 %

## 2024-04-05 PROBLEM — N39.0 UTI (URINARY TRACT INFECTION): Status: ACTIVE | Noted: 2024-04-05

## 2024-04-05 PROBLEM — A74.9 CHLAMYDIA: Status: ACTIVE | Noted: 2024-04-05

## 2024-04-05 PROBLEM — Z3A.29 29 WEEKS GESTATION OF PREGNANCY: Status: RESOLVED | Noted: 2024-04-01 | Resolved: 2024-04-05

## 2024-04-05 PROBLEM — O09.90 HIGH-RISK PREGNANCY, UNSPECIFIED TRIMESTER: Status: ACTIVE | Noted: 2024-04-05

## 2024-04-05 LAB
BACTERIA URNS QL MICRO: ABNORMAL
BILIRUB UR QL STRIP: NEGATIVE
CASTS #/AREA URNS LPF: ABNORMAL /LPF (ref 0–8)
CLARITY UR: ABNORMAL
COLOR UR: YELLOW
EPI CELLS #/AREA URNS HPF: ABNORMAL /HPF (ref 0–5)
GLUCOSE UR STRIP-MCNC: NEGATIVE MG/DL
HGB UR QL STRIP.AUTO: NEGATIVE
KETONES UR STRIP-MCNC: ABNORMAL MG/DL
LEUKOCYTE ESTERASE UR QL STRIP: ABNORMAL
NITRITE UR QL STRIP: NEGATIVE
PH UR STRIP: 6 [PH] (ref 5–8)
PROT UR STRIP-MCNC: ABNORMAL MG/DL
RBC #/AREA URNS HPF: ABNORMAL /HPF (ref 0–4)
SP GR UR STRIP: 1.03 (ref 1–1.03)
UROBILINOGEN UR STRIP-ACNC: NORMAL EU/DL (ref 0–1)
WBC #/AREA URNS HPF: ABNORMAL /HPF (ref 0–5)

## 2024-04-05 PROCEDURE — 6370000000 HC RX 637 (ALT 250 FOR IP): Performed by: STUDENT IN AN ORGANIZED HEALTH CARE EDUCATION/TRAINING PROGRAM

## 2024-04-05 PROCEDURE — 81001 URINALYSIS AUTO W/SCOPE: CPT

## 2024-04-05 PROCEDURE — 87086 URINE CULTURE/COLONY COUNT: CPT

## 2024-04-05 PROCEDURE — 6370000000 HC RX 637 (ALT 250 FOR IP)

## 2024-04-05 RX ORDER — CYCLOBENZAPRINE HCL 5 MG
5 TABLET ORAL ONCE
Status: COMPLETED | OUTPATIENT
Start: 2024-04-05 | End: 2024-04-05

## 2024-04-05 RX ORDER — ONDANSETRON 4 MG/1
4 TABLET, ORALLY DISINTEGRATING ORAL EVERY 8 HOURS PRN
Status: DISCONTINUED | OUTPATIENT
Start: 2024-04-05 | End: 2024-04-05 | Stop reason: HOSPADM

## 2024-04-05 RX ORDER — AZITHROMYCIN 250 MG/1
1000 TABLET, FILM COATED ORAL ONCE
Status: COMPLETED | OUTPATIENT
Start: 2024-04-05 | End: 2024-04-05

## 2024-04-05 RX ORDER — CYCLOBENZAPRINE HCL 5 MG
5 TABLET ORAL 2 TIMES DAILY PRN
Qty: 14 TABLET | Refills: 0 | Status: SHIPPED | OUTPATIENT
Start: 2024-04-05

## 2024-04-05 RX ORDER — ONDANSETRON 2 MG/ML
4 INJECTION INTRAMUSCULAR; INTRAVENOUS EVERY 6 HOURS PRN
Status: DISCONTINUED | OUTPATIENT
Start: 2024-04-05 | End: 2024-04-05 | Stop reason: HOSPADM

## 2024-04-05 RX ORDER — ACETAMINOPHEN 500 MG
1000 TABLET ORAL EVERY 8 HOURS SCHEDULED
Status: DISCONTINUED | OUTPATIENT
Start: 2024-04-05 | End: 2024-04-05 | Stop reason: HOSPADM

## 2024-04-05 RX ADMIN — CYCLOBENZAPRINE HYDROCHLORIDE 5 MG: 5 TABLET, FILM COATED ORAL at 19:30

## 2024-04-05 RX ADMIN — AZITHROMYCIN 1000 MG: 250 TABLET, FILM COATED ORAL at 17:09

## 2024-04-05 RX ADMIN — ACETAMINOPHEN 1000 MG: 500 TABLET ORAL at 17:14

## 2024-04-05 RX ADMIN — ONDANSETRON 4 MG: 4 TABLET, ORALLY DISINTEGRATING ORAL at 17:14

## 2024-04-05 ASSESSMENT — PAIN DESCRIPTION - LOCATION: LOCATION: ABDOMEN;BACK
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
## 2024-04-06 LAB
MICROORGANISM SPEC CULT: NORMAL
SPECIMEN DESCRIPTION: NORMAL

## 2024-04-06 NOTE — DISCHARGE INSTRUCTIONS
Diet:   Regular                     Increase Fluids  8-10 glasses/day    Activities:    As tolerated            Frequent rest periods           Additional Instructions:  Notify Physician    If any of the following                                   **Spots before eyes or blurred vision                      ** Intermittent low backache  **Dizziness or severe Headache                                 **  Vomiting or diarrhea for several hours  **Chills & or fever                                                      **Decrease or absence of baby movement  **Vaginal pressure                                       **Epigastric pain                                                                                                                     **  Right sided abdominal pain                                        Call [facility] on Monday to make appointment!!  **Uterine contractions are regular & 5 min. Apart         **Bag or marsh leaking or gush of fluid from vagina     **Abdominal or menstrual like cramping that is constant or comes and goes  **Any vaginal bleeding that is heavier than a menstrual period  **Swelling of face, hands, legs or feet not decreased with rest on left side

## 2024-04-22 ENCOUNTER — HOSPITAL ENCOUNTER (EMERGENCY)
Age: 24
Discharge: HOME | End: 2024-04-23
Attending: EMERGENCY MEDICINE
Payer: COMMERCIAL

## 2024-04-22 VITALS
SYSTOLIC BLOOD PRESSURE: 111 MMHG | OXYGEN SATURATION: 99 % | WEIGHT: 149.91 LBS | DIASTOLIC BLOOD PRESSURE: 66 MMHG | BODY MASS INDEX: 24.2 KG/M2 | HEART RATE: 84 BPM | TEMPERATURE: 97 F | RESPIRATION RATE: 16 BRPM

## 2024-04-22 DIAGNOSIS — L29.9 ITCHING: Primary | ICD-10-CM

## 2024-04-22 PROCEDURE — 99283 EMERGENCY DEPT VISIT LOW MDM: CPT

## 2024-04-22 PROCEDURE — 6370000000 HC RX 637 (ALT 250 FOR IP)

## 2024-04-22 RX ORDER — CETIRIZINE HYDROCHLORIDE 10 MG/1
10 TABLET ORAL ONCE
Status: COMPLETED | OUTPATIENT
Start: 2024-04-23 | End: 2024-04-22

## 2024-04-22 RX ADMIN — CETIRIZINE HYDROCHLORIDE 10 MG: 10 TABLET ORAL at 23:49

## 2024-04-22 ASSESSMENT — PAIN - FUNCTIONAL ASSESSMENT: PAIN_FUNCTIONAL_ASSESSMENT: 0-10

## 2024-04-22 ASSESSMENT — PAIN DESCRIPTION - DESCRIPTORS: DESCRIPTORS: ITCHING

## 2024-04-22 ASSESSMENT — PAIN DESCRIPTION - LOCATION: LOCATION: FOOT

## 2024-04-22 ASSESSMENT — PAIN DESCRIPTION - ORIENTATION: ORIENTATION: RIGHT;LEFT

## 2024-04-22 ASSESSMENT — PAIN SCALES - GENERAL: PAINLEVEL_OUTOF10: 10

## 2024-04-23 RX ORDER — DIPHENHYDRAMINE HCL 25 MG
25 CAPSULE ORAL EVERY 4 HOURS PRN
Qty: 14 CAPSULE | Refills: 0 | Status: SHIPPED | OUTPATIENT
Start: 2024-04-23

## 2024-04-23 ASSESSMENT — ENCOUNTER SYMPTOMS
VOMITING: 0
NAUSEA: 0
ABDOMINAL PAIN: 0
SHORTNESS OF BREATH: 0

## 2024-04-23 NOTE — DISCHARGE INSTRUCTIONS
You were seen in the emergency room and for itching to feet.  As we discussed, ensure that you wear clean socks daily.  You may use Benadryl as needed for itching, a prescription for this was sent to your pharmacy.    There is no evidence of scabies.    You declined blood work, we did discuss that this is needed to rule out more deadly pathology.    Return to the emergency room if you have any worsening itching, skin color changes, worsening belly pain, vaginal bleeding/discharge, or any other acute medical concern.    Schedule an appointment to be seen by your OB/GYN as soon as possible.  If you do not have an OB/GYN, schedule an appointment to be seen by the [facility] OB/GYN at the number below.

## 2024-04-23 NOTE — ED NOTES
Pt arrives via triage, pt c/o of itching feet bilaterally.   Pt is 32 weeks pregnant.   Pt states the itching started today, pt states she was not wearing socks with her shoes today.   Pt states she was seen the 17th for a check up on pregnancy.   Pt is A+Ox4, call light in reach.

## 2024-04-23 NOTE — ED PROVIDER NOTES
[facility]     Emergency Department     Faculty Attestation    I performed a history and physical examination of the patient and discussed management with the resident. I have reviewed and agree with the resident’s findings including all diagnostic interpretations, and treatment plans as written. Any areas of disagreement are noted on the chart. I was personally present for the key portions of any procedures. I have documented in the chart those procedures where I was not present during the key portions. I have reviewed the emergency nurses triage note. I agree with the chief complaint, past medical history, past surgical history, allergies, medications, social and family history as documented unless otherwise noted below. Documentation of the HPI, Physical Exam and Medical Decision Making performed by scribyesi is based on my personal performance of the HPI, PE and MDM. For Physician Assistant/ Nurse Practitioner cases/documentation I have personally evaluated this patient and have completed at least one if not all key elements of the E/M (history, physical exam, and MDM). Additional findings are as noted.    Note Started: 11:32 PM EDT     22 yo F pruritus feet, patient notes itching to bilateral feet starting today, no vaginal bleeding, 32 weeks gestation, no fever,  PE vss gcs 15 minimal erythema to bilateral feet medial lateral and dorsal feet, no petechia or purpura as observed    suspect mild contact dermatitis, involvement minimal  Pt declines lab, pt aware of risk,   Fht 120's, referring to ob,     EKG Interpretation    Interpreted by me      CRITICAL CARE: There was a high probability of clinically significant/life threatening deterioration in this patient's condition which required my urgent intervention.  Total critical care time was 0 minutes.  This excludes any time for separately reportable procedures.       Noe Croft,

## 2024-04-23 NOTE — ED PROVIDER NOTES
[facility] ED  Emergency Department Encounter  Emergency Medicine Resident     Pt Name:Marie Golden  MRN: [id number]  Birthdate 2000  Date of evaluation: 4/23/24  PCP:  No primary care provider on file.  Note Started: 8:54 AM EDT      CHIEF COMPLAINT       Chief Complaint   Patient presents with    Feet Itch    32 weeks Preg       HISTORY OF PRESENT ILLNESS  (Location/Symptom, Timing/Onset, Context/Setting, Quality, Duration, Modifying Factors, Severity.)      Marie Golden is a 23 y.o. female who presents with bilateral foot itching.  Patient states that has been ongoing all day.  Patient states that she has run out of clean socks and therefore has not been wearing socks when she wear shoes.  Patient denies itching elsewhere, denies skin color change, denies hives, denies allergies, denies fever or chills, denies trauma.    Patient denies previous similar incidents.  Patient states that she is staying at a motel, she is concerned that she might have scabies as her friend who is also staying at the motel has \"an itchy back\".    Patient denies medication changes.  She is on multiple psychotropic medications.    Patient is 32 weeks pregnant, does endorse positive fetal movements, denies vaginal bleeding or discharge, denies gush of fluid.  She follows with [facility].  Patient denies abdominal pain, denies chest pain or shortness of breath.    Patient states she is meant to be on anticoagulation for history of PEs however she does not want to keep poking herself with Lovenox injections and thus has not been on it for weeks.  She understands that there is a risk of a blood clot forming given that she has a predisposition to this and the risk is increased during pregnancy, understands that this poses a risk to her life and the life of the baby.    PAST MEDICAL / SURGICAL / SOCIAL / FAMILY HISTORY      has a past medical history of Anxiety, ASCUS w/ High Risk HPV 12/18/23, Autism,

## 2024-05-01 ENCOUNTER — TELEPHONE (OUTPATIENT)
Dept: INTERNAL MEDICINE | Age: 24
End: 2024-05-01

## 2024-05-01 NOTE — TELEPHONE ENCOUNTER
----- Message from Kevin Tapia sent at 4/30/2024 12:03 PM EDT -----  Subject: Message to Provider    QUESTIONS  Information for Provider? Patient is looking to schedule an appointment   with the obgyn doctor Yris Arredondo and would like a call back with an   appointment.  ---------------------------------------------------------------------------  --------------  CALL BACK INFO  [phone]; OK to leave message on voicemail  ---------------------------------------------------------------------------  --------------  SCRIPT ANSWERS  Relationship to Patient? Self

## 2024-05-02 NOTE — TELEPHONE ENCOUNTER
Patient called the office again wanting to make an appointment. Patient is 34w 1d- patient originally started out with our office and did not like her care so she transferred to [facility] in January. Patient is now requesting to transfer her care back to us.    Please advise.    The earliest we could get her in is 5/17/24.

## 2024-05-03 NOTE — TELEPHONE ENCOUNTER
Patient was called and scheduled for 5/20 @ 2:15PM.    Patient was instructed to continue seeing her current OB until she is seen in our office. Patient states she has an appointment today that she will go to.

## 2024-05-05 PROBLEM — N39.0 UTI (URINARY TRACT INFECTION): Status: RESOLVED | Noted: 2024-04-05 | Resolved: 2024-05-05

## 2024-05-17 ENCOUNTER — TELEPHONE (OUTPATIENT)
Dept: OBGYN | Age: 24
End: 2024-05-17

## 2024-06-03 ENCOUNTER — APPOINTMENT (OUTPATIENT)
Dept: GENERAL RADIOLOGY | Age: 24
End: 2024-06-03
Payer: COMMERCIAL

## 2024-06-03 ENCOUNTER — HOSPITAL ENCOUNTER (EMERGENCY)
Age: 24
Discharge: HOME | End: 2024-06-03
Attending: EMERGENCY MEDICINE
Payer: COMMERCIAL

## 2024-06-03 VITALS
TEMPERATURE: 98.1 F | OXYGEN SATURATION: 99 % | SYSTOLIC BLOOD PRESSURE: 113 MMHG | DIASTOLIC BLOOD PRESSURE: 70 MMHG | RESPIRATION RATE: 18 BRPM | HEART RATE: 61 BPM

## 2024-06-03 DIAGNOSIS — R09.A2 GLOBUS SENSATION: Primary | ICD-10-CM

## 2024-06-03 DIAGNOSIS — J03.90 ACUTE TONSILLITIS, UNSPECIFIED ETIOLOGY: ICD-10-CM

## 2024-06-03 PROCEDURE — 99283 EMERGENCY DEPT VISIT LOW MDM: CPT

## 2024-06-03 PROCEDURE — 6370000000 HC RX 637 (ALT 250 FOR IP): Performed by: STUDENT IN AN ORGANIZED HEALTH CARE EDUCATION/TRAINING PROGRAM

## 2024-06-03 PROCEDURE — 70360 X-RAY EXAM OF NECK: CPT

## 2024-06-03 RX ORDER — PREDNISONE 50 MG/1
50 TABLET ORAL DAILY
Qty: 5 TABLET | Refills: 0 | Status: SHIPPED | OUTPATIENT
Start: 2024-06-03 | End: 2024-06-08

## 2024-06-03 RX ORDER — CLINDAMYCIN HYDROCHLORIDE 150 MG/1
300 CAPSULE ORAL ONCE
Status: COMPLETED | OUTPATIENT
Start: 2024-06-03 | End: 2024-06-03

## 2024-06-03 RX ORDER — PREDNISONE 20 MG/1
50 TABLET ORAL ONCE
Status: COMPLETED | OUTPATIENT
Start: 2024-06-03 | End: 2024-06-03

## 2024-06-03 RX ORDER — CLINDAMYCIN HYDROCHLORIDE 300 MG/1
300 CAPSULE ORAL 2 TIMES DAILY
Qty: 14 CAPSULE | Refills: 0 | Status: SHIPPED | OUTPATIENT
Start: 2024-06-03 | End: 2024-06-10

## 2024-06-03 RX ADMIN — PREDNISONE 50 MG: 20 TABLET ORAL at 12:45

## 2024-06-03 RX ADMIN — CLINDAMYCIN HYDROCHLORIDE 300 MG: 150 CAPSULE ORAL at 12:45

## 2024-06-03 ASSESSMENT — PAIN DESCRIPTION - LOCATION: LOCATION: THROAT

## 2024-06-03 ASSESSMENT — PAIN SCALES - GENERAL: PAINLEVEL_OUTOF10: 5

## 2024-06-03 ASSESSMENT — PAIN DESCRIPTION - DESCRIPTORS: DESCRIPTORS: DISCOMFORT

## 2024-06-03 ASSESSMENT — PAIN DESCRIPTION - PAIN TYPE: TYPE: ACUTE PAIN

## 2024-06-03 ASSESSMENT — PAIN - FUNCTIONAL ASSESSMENT: PAIN_FUNCTIONAL_ASSESSMENT: 0-10

## 2024-06-03 NOTE — ED NOTES
Pt to ED with c/o difficulty swallowing d/t something being stuck in there throat. Pt reports she was an onion bagel followed by eating chips/dip this morning and feels like something else is stuck.

## 2024-06-03 NOTE — ED NOTES
Pt back from Xray, reported to writer \"when I went to sit down after my Xray I felt dizzy. That's not good\".  Dr. Pruitt notified.

## 2024-06-03 NOTE — DISCHARGE INSTRUCTIONS
You are seen in emergency department for feeling of something in the throat.  Did not see anything visually does not manage is not something there.  X-ray shows evidence swelling of the tonsils and adenoids.  Will start on antibiotics.  Will also give short steroids of steroids as this will help with discomfort in the throat.  Please follow-up with primary care provider in the next 1 to 2 weeks.  For reevaluation.    [facility] ED Clinic List    Healthcare Providers Services Day of Week/ Hours   [facility]  [address] [facility]  [phone] Pediatric Primary Care  Adult Primary Care  OB/GYN/Prenatal/Specialty Clinics Monday - Friday  8:00a - 4:30p   [address]  [phone] Adult Medicine, Pediatrics, OB/GYN Monday - Friday  8:30a - 4:30p   [facility] Surgery  [address] [place] [address]  [phone]  Wednesday 8:30a - 11:00a   [place] [address] Adult Internal Medicine   ([facility])  [phone]  OB/GYN Clinic  [phone]    Pediatric Clinic  [phone]   Monday, Tuesday, Thursday, Friday  8:00a - 4:30p  Wednesday 1:00p - 4:30p  Monday, Tuesday, Thursday 8:00a - 5:00p;  Friday 8:00a - 12:30p  Wednesday 1p - 4p  Monday, Tuesday, Thursday, Friday  8:30a - 4:15p  Wednesday 12:30p - 4:15p   Health Department  [address]  [phone] Pediatric Primary Care  Adult Primary Care  OB/Prenatal Monday - Wednesday, Friday Monday - Friday 8a - 12p  Thursday 8a - 4:45p   [facility]  [address] [address]  [phone]  [address] [place] [address] [address] [phone] Pregnancy  Pre & Post Adoption Counseling  Pregnancy Support  Prenatal / nutrition Care  Reward Incentive Program [facility]  Mon, Tues, Wed, Fri 10:00a - 4:30p  Thur 10:00a - 7:30p  [place] Location  Monday - Friday 10a - 4:30p   [facility]   OB/GYN  [address],   [address]  [phone]  Adult Internal

## 2024-06-23 ENCOUNTER — HOSPITAL ENCOUNTER (EMERGENCY)
Age: 24
Discharge: HOME | End: 2024-06-23
Attending: EMERGENCY MEDICINE
Payer: COMMERCIAL

## 2024-06-23 VITALS
TEMPERATURE: 97.2 F | SYSTOLIC BLOOD PRESSURE: 116 MMHG | RESPIRATION RATE: 16 BRPM | OXYGEN SATURATION: 98 % | HEART RATE: 76 BPM | DIASTOLIC BLOOD PRESSURE: 81 MMHG

## 2024-06-23 DIAGNOSIS — N30.00 ACUTE CYSTITIS WITHOUT HEMATURIA: ICD-10-CM

## 2024-06-23 DIAGNOSIS — K62.5 RECTAL BLEEDING: Primary | ICD-10-CM

## 2024-06-23 LAB
BACTERIA URNS QL MICRO: NORMAL
BASOPHILS # BLD: <0.03 K/UL (ref 0–0.2)
BASOPHILS NFR BLD: 0 % (ref 0–2)
BILIRUB UR QL STRIP: NEGATIVE
CASTS #/AREA URNS LPF: NORMAL /LPF (ref 0–8)
CLARITY UR: ABNORMAL
COLOR UR: YELLOW
EOSINOPHIL # BLD: 0.12 K/UL (ref 0–0.44)
EOSINOPHILS RELATIVE PERCENT: 2 % (ref 1–4)
EPI CELLS #/AREA URNS HPF: NORMAL /HPF (ref 0–5)
ERYTHROCYTE [DISTWIDTH] IN BLOOD BY AUTOMATED COUNT: 13.4 % (ref 11.8–14.4)
GLUCOSE UR STRIP-MCNC: NEGATIVE MG/DL
HCT VFR BLD AUTO: 32.9 % (ref 36.3–47.1)
HGB BLD-MCNC: 10.3 G/DL (ref 11.9–15.1)
HGB UR QL STRIP.AUTO: ABNORMAL
IMM GRANULOCYTES # BLD AUTO: <0.03 K/UL (ref 0–0.3)
IMM GRANULOCYTES NFR BLD: 0 %
INR PPP: 1.1
KETONES UR STRIP-MCNC: ABNORMAL MG/DL
LEUKOCYTE ESTERASE UR QL STRIP: ABNORMAL
LYMPHOCYTES NFR BLD: 2.19 K/UL (ref 1.1–3.7)
LYMPHOCYTES RELATIVE PERCENT: 42 % (ref 25–45)
MCH RBC QN AUTO: 26.8 PG (ref 25.2–33.5)
MCHC RBC AUTO-ENTMCNC: 31.3 G/DL (ref 28.4–34.8)
MCV RBC AUTO: 85.5 FL (ref 82.6–102.9)
MONOCYTES NFR BLD: 0.45 K/UL (ref 0.1–1.4)
MONOCYTES NFR BLD: 9 % (ref 2–8)
NEUTROPHILS NFR BLD: 47 % (ref 34–64)
NEUTS SEG NFR BLD: 2.45 K/UL (ref 1.5–8.1)
NITRITE UR QL STRIP: NEGATIVE
NRBC BLD-RTO: 0 PER 100 WBC
PARTIAL THROMBOPLASTIN TIME: 29.1 SEC (ref 23–36.5)
PH UR STRIP: 5.5 [PH] (ref 5–8)
PLATELET # BLD AUTO: 295 K/UL (ref 138–453)
PMV BLD AUTO: 10 FL (ref 8.1–13.5)
PROT UR STRIP-MCNC: ABNORMAL MG/DL
PROTHROMBIN TIME: 14.3 SEC (ref 11.7–14.9)
RBC # BLD AUTO: 3.85 M/UL (ref 3.95–5.11)
RBC #/AREA URNS HPF: NORMAL /HPF (ref 0–4)
SP GR UR STRIP: 1.03 (ref 1–1.03)
UROBILINOGEN UR STRIP-ACNC: NORMAL EU/DL (ref 0–1)
WBC #/AREA URNS HPF: NORMAL /HPF (ref 0–5)
WBC OTHER # BLD: 5.3 K/UL (ref 4.5–13.5)

## 2024-06-23 PROCEDURE — 85610 PROTHROMBIN TIME: CPT

## 2024-06-23 PROCEDURE — 6370000000 HC RX 637 (ALT 250 FOR IP): Performed by: STUDENT IN AN ORGANIZED HEALTH CARE EDUCATION/TRAINING PROGRAM

## 2024-06-23 PROCEDURE — 85730 THROMBOPLASTIN TIME PARTIAL: CPT

## 2024-06-23 PROCEDURE — 81001 URINALYSIS AUTO W/SCOPE: CPT

## 2024-06-23 PROCEDURE — 87086 URINE CULTURE/COLONY COUNT: CPT

## 2024-06-23 PROCEDURE — 99283 EMERGENCY DEPT VISIT LOW MDM: CPT | Performed by: EMERGENCY MEDICINE

## 2024-06-23 PROCEDURE — 85025 COMPLETE CBC W/AUTO DIFF WBC: CPT

## 2024-06-23 RX ORDER — 0.9 % SODIUM CHLORIDE 0.9 %
1000 INTRAVENOUS SOLUTION INTRAVENOUS ONCE
Status: DISCONTINUED | OUTPATIENT
Start: 2024-06-23 | End: 2024-06-23

## 2024-06-23 RX ORDER — CEPHALEXIN 500 MG/1
500 CAPSULE ORAL ONCE
Status: COMPLETED | OUTPATIENT
Start: 2024-06-23 | End: 2024-06-23

## 2024-06-23 RX ORDER — CEPHALEXIN 500 MG/1
500 CAPSULE ORAL 3 TIMES DAILY
Qty: 15 CAPSULE | Refills: 0 | Status: SHIPPED | OUTPATIENT
Start: 2024-06-23 | End: 2024-06-28

## 2024-06-23 RX ORDER — CEPHALEXIN 500 MG/1
500 CAPSULE ORAL 3 TIMES DAILY
Qty: 15 CAPSULE | Refills: 0 | Status: SHIPPED | OUTPATIENT
Start: 2024-06-23 | End: 2024-06-23

## 2024-06-23 RX ADMIN — CEPHALEXIN 500 MG: 500 CAPSULE ORAL at 06:27

## 2024-06-23 NOTE — ED NOTES
The following labs labeled with pt sticker and tubed to lab:     [x] Blue     [x] Lavender   [] on ice  [x] Green/yellow  [] Green/black [] on ice  [] Yellow  [] Red  [] Pink      [] COVID-19 swab    [] Rapid  [] PCR  [] Flu Swab  [] Strep Swab  [] Peds Viral Panel     [] Urine Sample  [] Pelvic Cultures  [] Blood Cultures   [] Wound Cultures

## 2024-06-23 NOTE — ED PROVIDER NOTES
threatening deterioration of patient's condition requiring my direct management. Critical care time minutes, excluding any documented procedures.    FINAL IMPRESSION      1. Rectal bleeding    2. Acute cystitis without hematuria          DISPOSITION / PLAN     DISPOSITION Decision To Discharge 06/23/2024 06:06:00 AM      PATIENT REFERRED TO:  [facility] ED  [address] [address]  [phone]    If symptoms worsen      DISCHARGE MEDICATIONS:  Current Discharge Medication List        START taking these medications    Details   cephALEXin (KEFLEX) 500 MG capsule Take 1 capsule by mouth 3 times daily for 5 days  Qty: 15 capsule, Refills: 0             Toni Ball DO  Emergency Medicine Resident    (Please note that portions of this note were completed with a voice recognition program.  Efforts were made to edit the dictations but occasionally words are mis-transcribed.)

## 2024-06-23 NOTE — DISCHARGE INSTRUCTIONS
If given narcotics (opiates) during this Emergency Department visit, you should not drink, drive or operate any machinery for at least 6 hours.    For pain use acetaminophen (Tylenol) unless prescribed medications that have acetaminophen in it.  You can take over the counter acetaminophen tablets (1 - 2 tablets of the 500-mg strength every 6 hours).    PLEASE RETURN TO THE EMERGENCY DEPARTMENT IMMEDIATELY for worsening symptoms, increase in the amount of blood coming from your rectum (butt), feeling light-headed / dizzy, heart racing, abdominal pain, or if you develop any concerning symptoms such as: high fever not relieved by acetaminophen (Tylenol) and/or ibuprofen (Motrin / Advil), chills, shortness of breath, chest pain, feeling of your heart fluttering or racing, persistent nausea and/or vomiting, vomiting up blood, blood in your stool, loss of consciousness, numbness, weakness or tingling in the arms or legs or change in color of the extremities, changes in mental status, persistent headache, blurry vision loss of bladder / bowel control, unable to follow up with your physician, or other any other care or concern.

## 2024-06-23 NOTE — ED NOTES
Pt presents to the ED via triage with c/o rectal bleeding  Pt states she noticed rectal bleeding tonight in which she is concerned for hemorrhoids  Pt denies pain  Admits bright blood  Pt states she had Csection about 30 days ago  Denies any other complaints  Whiteboard updated, call light in reach, vitals obtained

## 2024-06-23 NOTE — ED NOTES
Pt states she is unable to provide urine sample at this time  RN encouraged pt to use restroom and provided urine specimen cup

## 2024-06-24 LAB
MICROORGANISM SPEC CULT: NO GROWTH
SPECIMEN DESCRIPTION: NORMAL

## 2024-07-15 ENCOUNTER — HOSPITAL ENCOUNTER (EMERGENCY)
Age: 24
Discharge: HOME | End: 2024-07-16
Attending: EMERGENCY MEDICINE
Payer: COMMERCIAL

## 2024-07-15 DIAGNOSIS — R10.2 SUPRAPUBIC ABDOMINAL PAIN: Primary | ICD-10-CM

## 2024-07-15 PROCEDURE — 87591 N.GONORRHOEAE DNA AMP PROB: CPT

## 2024-07-15 PROCEDURE — 87510 GARDNER VAG DNA DIR PROBE: CPT

## 2024-07-15 PROCEDURE — 87480 CANDIDA DNA DIR PROBE: CPT

## 2024-07-15 PROCEDURE — 87491 CHLMYD TRACH DNA AMP PROBE: CPT

## 2024-07-15 PROCEDURE — 99283 EMERGENCY DEPT VISIT LOW MDM: CPT

## 2024-07-15 PROCEDURE — 6370000000 HC RX 637 (ALT 250 FOR IP)

## 2024-07-15 PROCEDURE — 87660 TRICHOMONAS VAGIN DIR PROBE: CPT

## 2024-07-15 RX ORDER — IBUPROFEN 400 MG/1
400 TABLET ORAL ONCE
Status: COMPLETED | OUTPATIENT
Start: 2024-07-15 | End: 2024-07-15

## 2024-07-15 RX ORDER — ONDANSETRON 4 MG/1
4 TABLET, FILM COATED ORAL ONCE
Status: COMPLETED | OUTPATIENT
Start: 2024-07-15 | End: 2024-07-15

## 2024-07-15 RX ORDER — ONDANSETRON 4 MG/1
4 TABLET, ORALLY DISINTEGRATING ORAL EVERY 8 HOURS PRN
Status: DISCONTINUED | OUTPATIENT
Start: 2024-07-15 | End: 2024-07-15

## 2024-07-15 RX ORDER — ACETAMINOPHEN 325 MG/1
650 TABLET ORAL ONCE
Status: COMPLETED | OUTPATIENT
Start: 2024-07-15 | End: 2024-07-15

## 2024-07-15 RX ADMIN — ACETAMINOPHEN 650 MG: 325 TABLET ORAL at 22:35

## 2024-07-15 RX ADMIN — IBUPROFEN 400 MG: 400 TABLET, FILM COATED ORAL at 22:35

## 2024-07-15 RX ADMIN — ONDANSETRON HYDROCHLORIDE 4 MG: 4 TABLET, FILM COATED ORAL at 22:46

## 2024-07-15 ASSESSMENT — PAIN - FUNCTIONAL ASSESSMENT: PAIN_FUNCTIONAL_ASSESSMENT: NONE - DENIES PAIN

## 2024-07-16 VITALS
BODY MASS INDEX: 23.95 KG/M2 | HEIGHT: 66 IN | DIASTOLIC BLOOD PRESSURE: 71 MMHG | SYSTOLIC BLOOD PRESSURE: 135 MMHG | TEMPERATURE: 98.2 F | RESPIRATION RATE: 18 BRPM | WEIGHT: 149 LBS | HEART RATE: 96 BPM | OXYGEN SATURATION: 97 %

## 2024-07-16 LAB
BACTERIA URNS QL MICRO: ABNORMAL
BILIRUB UR QL STRIP: NEGATIVE
C TRACH DNA SPEC QL PROBE+SIG AMP: NEGATIVE
CANDIDA SPECIES: NEGATIVE
CHP ED QC CHECK: YES
CLARITY UR: CLEAR
COLOR UR: YELLOW
EPI CELLS #/AREA URNS HPF: ABNORMAL /HPF (ref 0–5)
GARDNERELLA VAGINALIS: NEGATIVE
GLUCOSE UR STRIP-MCNC: NEGATIVE MG/DL
HGB UR QL STRIP.AUTO: NEGATIVE
KETONES UR STRIP-MCNC: ABNORMAL MG/DL
LEUKOCYTE ESTERASE UR QL STRIP: ABNORMAL
MUCOUS THREADS URNS QL MICRO: ABNORMAL
N GONORRHOEA DNA SPEC QL PROBE+SIG AMP: NEGATIVE
NITRITE UR QL STRIP: NEGATIVE
PH UR STRIP: 6.5 [PH] (ref 5–8)
PREGNANCY TEST URINE, POC: NEGATIVE
PROT UR STRIP-MCNC: ABNORMAL MG/DL
RBC #/AREA URNS HPF: ABNORMAL /HPF (ref 0–2)
SOURCE: NORMAL
SP GR UR STRIP: 1.03 (ref 1–1.03)
SPECIMEN DESCRIPTION: NORMAL
TRICHOMONAS: NEGATIVE
UROBILINOGEN UR STRIP-ACNC: NORMAL EU/DL (ref 0–1)
WBC #/AREA URNS HPF: ABNORMAL /HPF (ref 0–5)

## 2024-07-16 PROCEDURE — 81001 URINALYSIS AUTO W/SCOPE: CPT

## 2024-07-16 PROCEDURE — 87086 URINE CULTURE/COLONY COUNT: CPT

## 2024-07-16 RX ORDER — ACETAMINOPHEN 500 MG
1000 TABLET ORAL 3 TIMES DAILY
Qty: 42 TABLET | Refills: 0 | Status: SHIPPED | OUTPATIENT
Start: 2024-07-16 | End: 2024-07-20

## 2024-07-16 NOTE — ED PROVIDER NOTES
I performed a history and physical examination of the patient and discussed management with the resident. I reviewed the resident’s note and agree with the documented findings and plan of care. Any areas of disagreement are noted on the chart. I was personally present for the key portions of any procedures. I have documented in the chart those procedures where I was not present during the key portions. Unless noted in my documentation, I agree with the chief complaint, past medical history, past surgical history, allergies, medications, social and family history as documented. Documentation of the HPI, Physical Exam and Medical Decision Making performed by medical students or scribes is based on my personal performance of the HPI, PE and MDM.   For Phys Assistant/ Nurse Practitioner cases/documentation I have personally evaluated this patient and have completed at least one if not all key elements of the E/M (history, physical exam, and MDM). I find the patient's history and physical exam are consistent with the NP/PA documentation. I agree with the care provided, treatment rendered, disposition and followup plan.   Additional findings are as noted.    Juan David Fry MD  Attending Emergency  Physician        This patient presented to the emergency room with complaints of lower abdominal pain that have been ongoing since the birth of her child a month and a half ago.  Patient underwent a  at that time.  No nausea or vomiting.  No diarrhea.  No dysuria, hematuria, frequency, urgency.  No abnormal vaginal bleeding or discharge.  No fevers or chills.  Should be noted the patient actually saw her obstetricians at [facility] OB clinic approximately a month ago and she actually had no complaints of abdominal pain at the time.  When I inquired about this she indicated that the pain has been present since her delivery but only worse for the past 2 days.  No history of trauma or other obvious inciting activity.  On

## 2024-07-16 NOTE — ED PROVIDER NOTES
[facility] ED  Emergency Department Encounter  Emergency Medicine Resident     Pt Name:Marie Golden  MRN: [id number]  Birthdate 2000  Date of evaluation: 7/15/24  PCP:  No primary care provider on file.  Note Started: 10:05 PM EDT      CHIEF COMPLAINT       Chief Complaint   Patient presents with    Wound Check             HISTORY OF PRESENT ILLNESS  (Location/Symptom, Timing/Onset, Context/Setting, Quality, Duration, Modifying Factors, Severity.)      aMrie Golden is a 23 y.o. female who presents with   Wound check  Patient had  on 2024 at [facility]  No complications since  Abdominal pain and incisional pain over the lower abdomen  Concerned scar is \"too dark\"   No drainage from scar  No urinary or bowel sx, no vaginal bleeding or discharge  Ibuprofen wo relief  Has not taken home pain medication today  F/u scheduled on  with OB/GYN    PAST MEDICAL / SURGICAL / SOCIAL / FAMILY HISTORY      has a past medical history of Anxiety, ASCUS w/ High Risk HPV /, Autism, Bipolar 1 disorder (HCC), Chlamydia, cHTN, Deep vein thrombosis (HCC), Depression, Depression with suicidal ideation, Heart defect, Heart murmur, Migraine, PTSD (post-traumatic stress disorder), and Thyroid disease.       has a past surgical history that includes  section.      Social History     Socioeconomic History    Marital status: Single     Spouse name: Not on file    Number of children: Not on file    Years of education: Not on file    Highest education level: Not on file   Occupational History    Not on file   Tobacco Use    Smoking status: Never    Smokeless tobacco: Never   Vaping Use    Vaping Use: Former    Quit date: 2023    Substances: Nicotine, Flavoring   Substance and Sexual Activity    Alcohol use: Not Currently    Drug use: Not Currently    Sexual activity: Not Currently     Partners: Male   Other Topics Concern    Not on file   Social History Narrative    Not

## 2024-07-16 NOTE — ED NOTES
Pt to ED 48 via triage c/o generalized abdominal pain. Pt also c/o nausea and a headache. Pt states she had a  on . Pt denies any chest pain or SOB. Pt RR is even and non-labored on arrival. Pt VSS. Resident is at the bedside to assess, plan of care ongoing.

## 2024-07-16 NOTE — DISCHARGE INSTRUCTIONS
You were seen for evaluation of lower abdominal pain.  Your workup in the emergency department did not show any signs of infection.  You will be discharged home with Tylenol that you can take at home for pain.  You need to follow-up outpatient with a primary care physician in the next 24 to 48 hours for reevaluation.  Return the emergency department immediately for any worsening pain, vaginal bleeding, chest pain, shortness of breath, dizziness or loss of consciousness, other new or concerning symptoms

## 2024-07-17 LAB
MICROORGANISM SPEC CULT: NORMAL
SERVICE CMNT-IMP: NORMAL
SPECIMEN DESCRIPTION: NORMAL

## 2024-07-19 ENCOUNTER — APPOINTMENT (OUTPATIENT)
Dept: GENERAL RADIOLOGY | Age: 24
End: 2024-07-19
Payer: COMMERCIAL

## 2024-07-19 ENCOUNTER — HOSPITAL ENCOUNTER (EMERGENCY)
Age: 24
Discharge: HOME | End: 2024-07-19
Attending: EMERGENCY MEDICINE
Payer: COMMERCIAL

## 2024-07-19 VITALS
HEART RATE: 60 BPM | DIASTOLIC BLOOD PRESSURE: 72 MMHG | RESPIRATION RATE: 18 BRPM | TEMPERATURE: 97.2 F | SYSTOLIC BLOOD PRESSURE: 109 MMHG | OXYGEN SATURATION: 100 %

## 2024-07-19 DIAGNOSIS — S53.402A SPRAIN OF LEFT UPPER ARM, INITIAL ENCOUNTER: Primary | ICD-10-CM

## 2024-07-19 PROCEDURE — 73080 X-RAY EXAM OF ELBOW: CPT

## 2024-07-19 PROCEDURE — 99283 EMERGENCY DEPT VISIT LOW MDM: CPT

## 2024-07-19 PROCEDURE — 73030 X-RAY EXAM OF SHOULDER: CPT

## 2024-07-19 PROCEDURE — 6370000000 HC RX 637 (ALT 250 FOR IP)

## 2024-07-19 RX ORDER — IBUPROFEN 200 MG
400 TABLET ORAL EVERY 8 HOURS PRN
Qty: 30 TABLET | Refills: 0 | Status: SHIPPED | OUTPATIENT
Start: 2024-07-19

## 2024-07-19 RX ORDER — IBUPROFEN 200 MG
400 TABLET ORAL EVERY 8 HOURS PRN
Qty: 30 TABLET | Refills: 0 | Status: SHIPPED | OUTPATIENT
Start: 2024-07-19 | End: 2024-07-19

## 2024-07-19 RX ORDER — KETOROLAC TROMETHAMINE 30 MG/ML
30 INJECTION, SOLUTION INTRAMUSCULAR; INTRAVENOUS ONCE
Status: DISCONTINUED | OUTPATIENT
Start: 2024-07-19 | End: 2024-07-19 | Stop reason: HOSPADM

## 2024-07-19 RX ORDER — CYCLOBENZAPRINE HCL 10 MG
10 TABLET ORAL ONCE
Status: COMPLETED | OUTPATIENT
Start: 2024-07-19 | End: 2024-07-19

## 2024-07-19 RX ADMIN — CYCLOBENZAPRINE 10 MG: 10 TABLET, FILM COATED ORAL at 17:14

## 2024-07-19 ASSESSMENT — ENCOUNTER SYMPTOMS
NAUSEA: 0
ABDOMINAL PAIN: 0
VOMITING: 0
SHORTNESS OF BREATH: 0

## 2024-07-19 ASSESSMENT — PAIN - FUNCTIONAL ASSESSMENT
PAIN_FUNCTIONAL_ASSESSMENT: 0-10
PAIN_FUNCTIONAL_ASSESSMENT: NONE - DENIES PAIN

## 2024-07-19 ASSESSMENT — PAIN SCALES - GENERAL
PAINLEVEL_OUTOF10: 10
PAINLEVEL_OUTOF10: 10

## 2024-07-19 ASSESSMENT — PAIN DESCRIPTION - LOCATION
LOCATION: ARM
LOCATION: ARM

## 2024-07-19 ASSESSMENT — PAIN DESCRIPTION - DESCRIPTORS: DESCRIPTORS: BURNING

## 2024-07-19 ASSESSMENT — PAIN DESCRIPTION - ORIENTATION
ORIENTATION: LEFT
ORIENTATION: LEFT
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
## 2024-07-19 NOTE — ED TRIAGE NOTES
Pt presents to ED with c/o left arm pain.  Pt states at 2pm \"I moved my arm the wrong way\".  Pt states the pain starts at her shoulders and ends at her wrist.  Pt had a PE 2 years ago and is currently taking Eliquis but states she hasn't taken it since Wednesday.  Patient alert and oriented x4, talking in complete sentences. Respirations even and unlabored. Call light in reach, all needs met at this time.

## 2024-07-19 NOTE — DISCHARGE INSTRUCTIONS
You were seen in the emergency department for left arm and elbow pain.  You do have evidence of a muscle sprain.  Continue to use ibuprofen and Tylenol as needed for pain.  Return to the emergency department if you have any worsening pain, swelling of the arm, or any other acute medical concern.  You were given a sling for comfort.  Do not wear the sling for more than 5 days. Remove the sling twice daily and make circles with your arm as shown in the emergency department.    Schedule an appointment to be seen by your primary care doctor.  If you do not have a primary care doctor, schedule an appointment to be seen by the [facility] at the number below.

## 2024-07-20 ENCOUNTER — APPOINTMENT (OUTPATIENT)
Dept: GENERAL RADIOLOGY | Age: 24
End: 2024-07-20
Payer: COMMERCIAL

## 2024-07-20 ENCOUNTER — HOSPITAL ENCOUNTER (EMERGENCY)
Age: 24
Discharge: HOME | End: 2024-07-20
Attending: EMERGENCY MEDICINE
Payer: COMMERCIAL

## 2024-07-20 VITALS
RESPIRATION RATE: 15 BRPM | DIASTOLIC BLOOD PRESSURE: 90 MMHG | HEART RATE: 54 BPM | OXYGEN SATURATION: 100 % | SYSTOLIC BLOOD PRESSURE: 148 MMHG | TEMPERATURE: 98.2 F

## 2024-07-20 DIAGNOSIS — G44.201 ACUTE INTRACTABLE TENSION-TYPE HEADACHE: ICD-10-CM

## 2024-07-20 DIAGNOSIS — R07.9 CHEST PAIN, UNSPECIFIED TYPE: Primary | ICD-10-CM

## 2024-07-20 LAB
ANION GAP SERPL CALCULATED.3IONS-SCNC: 10 MMOL/L (ref 9–16)
BASOPHILS # BLD: <0.03 K/UL (ref 0–0.2)
BASOPHILS NFR BLD: 0 % (ref 0–2)
BUN SERPL-MCNC: 10 MG/DL (ref 6–20)
CALCIUM SERPL-MCNC: 11 MG/DL (ref 8.6–10.4)
CHLORIDE SERPL-SCNC: 104 MMOL/L (ref 98–107)
CO2 SERPL-SCNC: 25 MMOL/L (ref 20–31)
CREAT SERPL-MCNC: 0.6 MG/DL (ref 0.5–0.9)
D DIMER PPP FEU-MCNC: <0.27 UG/ML FEU (ref 0–0.57)
EOSINOPHIL # BLD: 0.09 K/UL (ref 0–0.44)
EOSINOPHILS RELATIVE PERCENT: 1 % (ref 1–4)
ERYTHROCYTE [DISTWIDTH] IN BLOOD BY AUTOMATED COUNT: 13.7 % (ref 11.8–14.4)
GFR, ESTIMATED: >90 ML/MIN/1.73M2
GLUCOSE SERPL-MCNC: 76 MG/DL (ref 74–99)
HCG SERPL QL: NEGATIVE
HCT VFR BLD AUTO: 38.7 % (ref 36.3–47.1)
HGB BLD-MCNC: 12.1 G/DL (ref 11.9–15.1)
IMM GRANULOCYTES # BLD AUTO: 0.03 K/UL (ref 0–0.3)
IMM GRANULOCYTES NFR BLD: 0 %
LYMPHOCYTES NFR BLD: 3.11 K/UL (ref 1.1–3.7)
LYMPHOCYTES RELATIVE PERCENT: 41 % (ref 25–45)
MAGNESIUM SERPL-MCNC: 2.2 MG/DL (ref 1.6–2.6)
MCH RBC QN AUTO: 26.7 PG (ref 25.2–33.5)
MCHC RBC AUTO-ENTMCNC: 31.3 G/DL (ref 28.4–34.8)
MCV RBC AUTO: 85.2 FL (ref 82.6–102.9)
MONOCYTES NFR BLD: 0.5 K/UL (ref 0.1–1.4)
MONOCYTES NFR BLD: 7 % (ref 2–8)
NEUTROPHILS NFR BLD: 51 % (ref 34–64)
NEUTS SEG NFR BLD: 3.79 K/UL (ref 1.5–8.1)
NRBC BLD-RTO: 0 PER 100 WBC
PLATELET # BLD AUTO: 304 K/UL (ref 138–453)
PMV BLD AUTO: 10.1 FL (ref 8.1–13.5)
POTASSIUM SERPL-SCNC: 3.7 MMOL/L (ref 3.7–5.3)
RBC # BLD AUTO: 4.54 M/UL (ref 3.95–5.11)
SODIUM SERPL-SCNC: 139 MMOL/L (ref 136–145)
TROPONIN I SERPL HS-MCNC: <6 NG/L (ref 0–14)
WBC OTHER # BLD: 7.5 K/UL (ref 4.5–13.5)

## 2024-07-20 PROCEDURE — 85379 FIBRIN DEGRADATION QUANT: CPT

## 2024-07-20 PROCEDURE — 84484 ASSAY OF TROPONIN QUANT: CPT

## 2024-07-20 PROCEDURE — 93005 ELECTROCARDIOGRAM TRACING: CPT | Performed by: EMERGENCY MEDICINE

## 2024-07-20 PROCEDURE — 85025 COMPLETE CBC W/AUTO DIFF WBC: CPT

## 2024-07-20 PROCEDURE — 80048 BASIC METABOLIC PNL TOTAL CA: CPT

## 2024-07-20 PROCEDURE — 84703 CHORIONIC GONADOTROPIN ASSAY: CPT

## 2024-07-20 PROCEDURE — 71046 X-RAY EXAM CHEST 2 VIEWS: CPT

## 2024-07-20 PROCEDURE — 99284 EMERGENCY DEPT VISIT MOD MDM: CPT | Performed by: EMERGENCY MEDICINE

## 2024-07-20 PROCEDURE — 83735 ASSAY OF MAGNESIUM: CPT

## 2024-07-20 RX ORDER — ACETAMINOPHEN 500 MG
500 TABLET ORAL 2 TIMES DAILY PRN
Qty: 30 TABLET | Refills: 0 | Status: SHIPPED | OUTPATIENT
Start: 2024-07-20

## 2024-07-21 NOTE — DISCHARGE INSTRUCTIONS
You been seen in the emergency department today due to concern for chest pain.  Your lab workup was unremarkable.  Your chest x-ray was clear.  Your symptoms are likely musculoskeletal in etiology.  Will be provided prescription for Tylenol to take for pain.  Monitor your symptoms closely at home.  Return to emergency room if you develop any new or concerning symptoms including but not limited to recurrent chest pain, shortness of breath, palpitations, fevers, chills or any other concerning evolution your symptoms.

## 2024-07-21 NOTE — ED PROVIDER NOTES
[facility]     Emergency Department     Faculty Attestation    I performed a history and physical examination of the patient and discussed management with the resident. I reviewed the resident’s note and agree with the documented findings and plan of care. Any areas of disagreement are noted on the chart. I was personally present for the key portions of any procedures. I have documented in the chart those procedures where I was not present during the key portions. I have reviewed the emergency nurses triage note. I agree with the chief complaint, past medical history, past surgical history, allergies, medications, social and family history as documented unless otherwise noted below. Documentation of the HPI, Physical Exam and Medical Decision Making performed by medical students or scribes is based on my personal performance of the HPI, PE and MDM. For Physician Assistant/ Nurse Practitioner cases/documentation I have personally evaluated this patient and have completed at least one if not all key elements of the E/M (history, physical exam, and MDM). Additional findings are as noted.    Vital signs:   Vitals:    24   BP: (!) 148/90   Pulse: 54   Resp: 15   Temp: 98.2 °F (36.8 °C)   SpO2: 100%      23-year-old female with past medical history significant for pulmonary embolism, DVT, and recent  presents with chest pain.  Patient states the pain started yesterday.  Is retrosternal and nonradiating.  Patient is also experiencing shortness of breath and a cough.  On exam, she is alert and afebrile.  Breath sounds clear and equal bilaterally.  Cardiac exam with a normal rate, regular rhythm.  Abdomen is soft and nontender.  Extremities with no edema or calf tenderness.    EKG Interpretation    Interpreted by me    Rhythm: Sinus bradycardia  Rate: normal  Axis: normal  Ectopy: none  Conduction: normal  ST Segments: no acute change  T Waves: no acute change  Q Waves:

## 2024-07-21 NOTE — ED PROVIDER NOTES
[facility] ED  Emergency Department Encounter  Emergency Medicine Resident     Pt Name:Marie Golden  MRN: [id number]  Birthdate 2000  Date of evaluation: 24  PCP:  No primary care provider on file.  Note Started: 8:19 PM EDT      CHIEF COMPLAINT       Chief Complaint   Patient presents with    Dizziness       HISTORY OF PRESENT ILLNESS  (Location/Symptom, Timing/Onset, Context/Setting, Quality, Duration, Modifying Factors, Severity.)      Marie Golden is a 23 y.o. female past medical history of pulmonary embolism, DVT, recent , presenting for assessment of chest pain.  Onset was yesterday.  It is substernal.  Nonradiating.  Associated with shortness of breath and cough.  Has taken ibuprofen for her symptoms with limited relief.  No recent travel.  No birth control    PAST MEDICAL / SURGICAL / SOCIAL / FAMILY HISTORY      has a past medical history of Anxiety, ASCUS w/ High Risk HPV 23, Autism, Bipolar 1 disorder (HCC), Chlamydia, cHTN, Deep vein thrombosis (HCC), Depression, Depression with suicidal ideation, Heart defect, Heart murmur, Migraine, PTSD (post-traumatic stress disorder), and Thyroid disease.       has a past surgical history that includes  section.      Social History     Socioeconomic History    Marital status: Single     Spouse name: Not on file    Number of children: Not on file    Years of education: Not on file    Highest education level: Not on file   Occupational History    Not on file   Tobacco Use    Smoking status: Never    Smokeless tobacco: Never   Vaping Use    Vaping Use: Former    Quit date: 2023    Substances: Nicotine, Flavoring   Substance and Sexual Activity    Alcohol use: Not Currently    Drug use: Not Currently    Sexual activity: Not Currently     Partners: Male   Other Topics Concern    Not on file   Social History Narrative    Not on file     Social Determinants of Health     Financial Resource Strain: Not on file

## 2024-07-24 LAB
EKG ATRIAL RATE: 55 BPM
EKG P AXIS: 18 DEGREES
EKG P-R INTERVAL: 126 MS
EKG Q-T INTERVAL: 408 MS
EKG QRS DURATION: 84 MS
EKG QTC CALCULATION (BAZETT): 390 MS
EKG R AXIS: 79 DEGREES
EKG T AXIS: 67 DEGREES
EKG VENTRICULAR RATE: 55 BPM

## 2024-08-03 ENCOUNTER — HOSPITAL ENCOUNTER (EMERGENCY)
Age: 24
Discharge: HOME | End: 2024-08-03
Attending: EMERGENCY MEDICINE
Payer: COMMERCIAL

## 2024-08-03 VITALS
HEIGHT: 66 IN | SYSTOLIC BLOOD PRESSURE: 101 MMHG | RESPIRATION RATE: 17 BRPM | DIASTOLIC BLOOD PRESSURE: 66 MMHG | TEMPERATURE: 98.6 F | HEART RATE: 77 BPM | WEIGHT: 137 LBS | OXYGEN SATURATION: 100 % | BODY MASS INDEX: 22.02 KG/M2

## 2024-08-03 DIAGNOSIS — S69.91XA INJURY OF RIGHT HAND, INITIAL ENCOUNTER: Primary | ICD-10-CM

## 2024-08-03 PROCEDURE — 99283 EMERGENCY DEPT VISIT LOW MDM: CPT

## 2024-08-03 RX ORDER — IBUPROFEN 600 MG/1
600 TABLET ORAL 3 TIMES DAILY PRN
Qty: 30 TABLET | Refills: 0 | Status: SHIPPED | OUTPATIENT
Start: 2024-08-03

## 2024-08-03 ASSESSMENT — PAIN DESCRIPTION - PAIN TYPE: TYPE: ACUTE PAIN

## 2024-08-03 ASSESSMENT — PAIN SCALES - GENERAL: PAINLEVEL_OUTOF10: 10

## 2024-08-03 ASSESSMENT — PAIN DESCRIPTION - FREQUENCY: FREQUENCY: INTERMITTENT

## 2024-08-03 ASSESSMENT — PAIN DESCRIPTION - LOCATION: LOCATION: HAND

## 2024-08-03 ASSESSMENT — PAIN DESCRIPTION - DESCRIPTORS: DESCRIPTORS: NUMBNESS;TINGLING

## 2024-08-03 ASSESSMENT — ENCOUNTER SYMPTOMS
COLOR CHANGE: 0
SHORTNESS OF BREATH: 0

## 2024-08-03 ASSESSMENT — PAIN DESCRIPTION - ORIENTATION: ORIENTATION: RIGHT

## 2024-08-03 NOTE — ED PROVIDER NOTES
I performed a history and physical examination of the patient and discussed management with the resident. I reviewed the resident’s note and agree with the documented findings and plan of care. Any areas of disagreement are noted on the chart. I was personally present for the key portions of any procedures. I have documented in the chart those procedures where I was not present during the key portions. Unless noted in my documentation, I agree with the chief complaint, past medical history, past surgical history, allergies, medications, social and family history as documented. Documentation of the HPI, Physical Exam and Medical Decision Making performed by medical students or scribes is based on my personal performance of the HPI, PE and MDM.   For Phys Assistant/ Nurse Practitioner cases/documentation I have personally evaluated this patient and have completed at least one if not all key elements of the E/M (history, physical exam, and MDM). I find the patient's history and physical exam are consistent with the NP/PA documentation. I agree with the care provided, treatment rendered, disposition and followup plan.   Additional findings are as noted.    Juan David Fry MD  Attending Emergency  Physician      This patient presented to the emerged part indicated she apparently tripped while walking and fell with her outstretched hand landing on \"great\".  She denies any wrist pain.  She also complains of mild discomfort of the left knee secondary to the fall.  She did not strike her head.  She did not lose consciousness.  There is no dizziness or lightheadedness.  Patient is right-hand dominant.  Tetanus is up-to-date.  On examination patient is awake and alert.  She is cooperative and responsive.  Examination of the right hand reveals superficial abrasions over the palmar aspect of the heel of the hand.  No deformity or crepitus.  No swelling.  Examination of the wrist reveals no swelling or tenderness.  There is no  snuffbox tenderness noted.  Patient has normal active range of motion of the hand on the wrist and the digits on the hand.  Distal sensation and capillary refill are intact.  Examination left knee reveals superficial abrasion over the anterior aspect of the knee.  There is no effusion.  No deformity or crepitus.  Patella is nontender.  Extensor mechanism is intact.  No ligamentous instability.  Radiographic evaluation is not indicated based on the patient's presentation.  Impression: Superficial abrasions over the palm of the right hand and superficial abrasion of the anterior aspect of the left knee.  Plan: The abrasions will be dressed and the patient be discharged with instructions to follow-up with her primary care provider at the next available opportunity.  She is advised to watch for signs of infection.  She is advised to utilize ibuprofen and/or acetaminophen as needed for control of her discomfort.  She is asked to return should her symptoms worsen or progress.        Juan David Fry MD  08/03/24 2034
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
## 2024-08-03 NOTE — ED TRIAGE NOTES
About 30 minutes ago was at [employer] running and tripped on a  metal grate and caught herself with her right hand and injured her right palm  Has a few abrasions , no bleeding noted and also reports she scrapped both her knees

## 2024-08-04 NOTE — DISCHARGE INSTRUCTIONS
You are seen in the emergency department for lacerations on your right hand.  Lacerations were cleaned with soap and water and they are superficial and do not require sutures.  We looked up the date of your last tetanus shot which was 2 years ago, so you are not given another tetanus shot in the emergency department today.  Recommend keeping your wound clean and dry.  You can clean with gentle soap and water.  You can cover the wound with a bandage if you are going to be outside or in a situation where the wound to get dirty.  Make sure you monitor for signs and symptoms of infection which include white or thick drainage from the site, redness around the site that is expanding or a significant increase in pain that is not relieved with over-the-counter Tylenol or ibuprofen.  I recommend making a follow-up appoint with your primary care doctor to let them know that you are seen in the emergency department.

## 2024-08-05 ENCOUNTER — OFFICE VISIT (OUTPATIENT)
Dept: NEUROLOGY | Age: 24
End: 2024-08-05

## 2024-08-05 VITALS
HEART RATE: 71 BPM | BODY MASS INDEX: 22.44 KG/M2 | SYSTOLIC BLOOD PRESSURE: 107 MMHG | DIASTOLIC BLOOD PRESSURE: 72 MMHG | WEIGHT: 139 LBS

## 2024-08-05 DIAGNOSIS — G44.201 ACUTE INTRACTABLE TENSION-TYPE HEADACHE: Primary | ICD-10-CM

## 2024-08-05 DIAGNOSIS — I26.99 PULMONARY EMBOLISM, UNSPECIFIED CHRONICITY, UNSPECIFIED PULMONARY EMBOLISM TYPE, UNSPECIFIED WHETHER ACUTE COR PULMONALE PRESENT (HCC): ICD-10-CM

## 2024-08-05 RX ORDER — FERROUS SULFATE 325(65) MG
325 TABLET ORAL
COMMUNITY
Start: 2024-07-03

## 2024-08-05 RX ORDER — AMITRIPTYLINE HYDROCHLORIDE 10 MG/1
10 TABLET, FILM COATED ORAL NIGHTLY
Qty: 50 TABLET | Refills: 2 | Status: SHIPPED | OUTPATIENT
Start: 2024-08-05

## 2024-08-05 RX ORDER — NARATRIPTAN 2.5 MG/1
2.5 TABLET ORAL 2 TIMES DAILY
Qty: 3 TABLET | Refills: 0 | Status: SHIPPED | OUTPATIENT
Start: 2024-08-05

## 2024-08-05 RX ORDER — SUMATRIPTAN 50 MG/1
50 TABLET, FILM COATED ORAL
Qty: 9 TABLET | Refills: 2 | Status: SHIPPED | OUTPATIENT
Start: 2024-08-05 | End: 2024-08-05

## 2024-08-05 RX ORDER — NARATRIPTAN 2.5 MG/1
2.5 TABLET ORAL DAILY
Qty: 3 TABLET | Refills: 0 | Status: SHIPPED | OUTPATIENT
Start: 2024-08-05 | End: 2024-08-05

## 2024-08-05 NOTE — PROGRESS NOTES
[address]  [place] [place] [address]  Dept: [phone]  Dept Fax: [phone]    NEUROLOGY FOLLOW UP NOTE                                              PATIENT NAME: Marie Golden   PATIENT MRN: [id number]  FOLLOW UP TODAY: 4/22/2024      INITIAL & INTERVAL HISTORY:     Marie Golden is a 23 y.o. female here with a history of a seizure-like episodes 1 year ago today-history of bipolar, PTSD, autism.  EEG at the time showed bihemispheric sharp waves and bursts of generalized slowing but no overt seizure activity.  Otherwise patient was discharged on no ASMs but did have instructions not to drive.    She was also admitted for psychiatric evaluation due to hallucinations and psychotic symptoms, currently on aripiprazole.    Today she presents with a throbbing headache that continues to occur daily for the last couple of weeks, she gave birth recently and is not breastfeeding but does not use any form of birth control due to history of PE and latex allergy      S- Center of forehead  O- this morning  C- tension, pulling like  R- radiates to L temple  A- dark room  T- Everyday for a couple of weeks ago  E- exacerbated by light, loud sounds  S- 8.5/10          PMH    has a past medical history of Anxiety, ASCUS w/ High Risk HPV 12/18/23, Autism, Bipolar 1 disorder (HCC), Chlamydia, cHTN, Deep vein thrombosis (HCC), Depression, Depression with suicidal ideation, Heart defect, Heart murmur, Migraine, PTSD (post-traumatic stress disorder), and Thyroid disease.     PSH/SH/FMH: Remain unchanged since last visit Visit date not found.    ALLERGIES:   Allergies   Allergen Reactions    Latex Hives    Penicillins Rash    Vyvanse [Lisdexamfetamine] Rash       MEDICATIONS:   Current Outpatient Medications   Medication Sig Dispense Refill    albuterol sulfate HFA (PROVENTIL;VENTOLIN;PROAIR) 108 (90 Base) MCG/ACT inhaler INHALE 2 PUFFS

## 2024-08-06 ENCOUNTER — TELEPHONE (OUTPATIENT)
Dept: ONCOLOGY | Age: 24
End: 2024-08-06

## 2024-08-11 ENCOUNTER — HOSPITAL ENCOUNTER (EMERGENCY)
Age: 24
Discharge: HOME | End: 2024-08-11
Attending: EMERGENCY MEDICINE
Payer: COMMERCIAL

## 2024-08-11 ENCOUNTER — APPOINTMENT (OUTPATIENT)
Dept: GENERAL RADIOLOGY | Age: 24
End: 2024-08-11
Payer: COMMERCIAL

## 2024-08-11 VITALS
DIASTOLIC BLOOD PRESSURE: 58 MMHG | RESPIRATION RATE: 18 BRPM | SYSTOLIC BLOOD PRESSURE: 105 MMHG | HEART RATE: 83 BPM | TEMPERATURE: 98.6 F | OXYGEN SATURATION: 97 %

## 2024-08-11 DIAGNOSIS — S39.012A STRAIN OF LUMBAR REGION, INITIAL ENCOUNTER: Primary | ICD-10-CM

## 2024-08-11 LAB — HCG UR QL: NEGATIVE

## 2024-08-11 PROCEDURE — 6370000000 HC RX 637 (ALT 250 FOR IP): Performed by: STUDENT IN AN ORGANIZED HEALTH CARE EDUCATION/TRAINING PROGRAM

## 2024-08-11 PROCEDURE — 72040 X-RAY EXAM NECK SPINE 2-3 VW: CPT

## 2024-08-11 PROCEDURE — 99284 EMERGENCY DEPT VISIT MOD MDM: CPT

## 2024-08-11 PROCEDURE — 72100 X-RAY EXAM L-S SPINE 2/3 VWS: CPT

## 2024-08-11 PROCEDURE — 72072 X-RAY EXAM THORAC SPINE 3VWS: CPT

## 2024-08-11 PROCEDURE — 81025 URINE PREGNANCY TEST: CPT

## 2024-08-11 RX ORDER — LIDOCAINE 50 MG/G
1 PATCH TOPICAL DAILY
Qty: 5 PATCH | Refills: 0 | Status: SHIPPED | OUTPATIENT
Start: 2024-08-11 | End: 2024-08-16

## 2024-08-11 RX ORDER — ACETAMINOPHEN 500 MG
1000 TABLET ORAL ONCE
Status: COMPLETED | OUTPATIENT
Start: 2024-08-11 | End: 2024-08-11

## 2024-08-11 RX ORDER — IBUPROFEN 600 MG/1
600 TABLET ORAL EVERY 8 HOURS PRN
Qty: 10 TABLET | Refills: 0 | Status: SHIPPED | OUTPATIENT
Start: 2024-08-11

## 2024-08-11 RX ORDER — ACETAMINOPHEN 500 MG
1000 TABLET ORAL EVERY 6 HOURS PRN
Qty: 10 TABLET | Refills: 0 | Status: SHIPPED | OUTPATIENT
Start: 2024-08-11

## 2024-08-11 RX ORDER — CYCLOBENZAPRINE HCL 10 MG
10 TABLET ORAL ONCE
Status: COMPLETED | OUTPATIENT
Start: 2024-08-11 | End: 2024-08-11

## 2024-08-11 RX ADMIN — CYCLOBENZAPRINE 10 MG: 10 TABLET, FILM COATED ORAL at 17:33

## 2024-08-11 RX ADMIN — ACETAMINOPHEN 1000 MG: 500 TABLET ORAL at 17:34

## 2024-08-11 ASSESSMENT — PAIN SCALES - GENERAL: PAINLEVEL_OUTOF10: 10

## 2024-08-11 ASSESSMENT — PAIN DESCRIPTION - ORIENTATION: ORIENTATION: MID

## 2024-08-11 ASSESSMENT — PAIN DESCRIPTION - DESCRIPTORS: DESCRIPTORS: ACHING

## 2024-08-11 ASSESSMENT — PAIN DESCRIPTION - LOCATION: LOCATION: BACK;NECK

## 2024-08-11 NOTE — PROGRESS NOTES
Spiritual Health Assessment/Progress Note  [facility]    Initial Encounter,  ,  ,      Name: Marie Golden MRN: [id number]    Age: 23 y.o.     Sex: female   Language: English   Uatsdin: None   <principal problem not specified>     Date: 8/11/2024            Total Time Calculated: 10 min              Spiritual Assessment began in [facility] ED        Referral/Consult From: Rounding   Encounter Overview/Reason: Initial Encounter  Service Provided For: Patient and family together    Funmi, Belief, Meaning:   Patient Other: Appears to be receptive to spiritual support and appears slightly anxious  Family/Friends Other: Appears receptive to spiritual support as well and remains bedside with the patient.       Importance and Influence:  Patient Other: Receptive to care  Family/Friends Other: Receptive to care    Community:  Patient Other: Has family support bedside  Family/Friends Other: Family appears attentive to patient.      Assessment and Plan of Care:     Patient Interventions include: Facilitated expression of thoughts and feelings and Affirmed coping skills/support systems  Family/Friends Interventions include: Provided hospitality     Patient Plan of Care: Spiritual Care available upon further referral  Family/Friends Plan of Care: Spiritual Care available upon further referral    Electronically signed by JOSE L Morgan on 8/11/2024 at 6:58 PM        08/11/24 1645   Encounter Summary   Encounter Overview/Reason Initial Encounter   Service Provided For Patient and family together   Referral/Consult From [facility]   Support System Family members   Last Encounter  08/11/24   Complexity of Encounter Low   Begin Time 1645   End Time  1655   Total Time Calculated 10 min   Assessment/Intervention/Outcome   Assessment Calm;Coping   Intervention Active listening;Discussed illness injury and it’s impact;Explored/Affirmed feelings, thoughts, concerns   Outcome Coping;Engaged in

## 2024-08-11 NOTE — ED NOTES
Pt resting in bed with personal items. No acute distress noted. Resp even and non labored. Will continue with plan of care.

## 2024-08-11 NOTE — DISCHARGE INSTRUCTIONS
Seen in the emergency department for a back strain.  X-ray images unremarkable for any acute fracture.  Given prescriptions for Tylenol, Motrin, Lidoderm patch.  Please use these as prescribed.  Please return to the emergency department if your symptoms worsen or you develop any numbness and tingling in your extremities, difficulty with bowel movements or urinating, episodes of passing out, significant abdominal pains, significant headaches, changes in vision, or any other concerning symptoms.  Please follow-up with your primary care within the next 1 to 2 days
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
## 2024-08-11 NOTE — ED NOTES
Pt up and ambulated to the bathroom without assistance. Steady gait observed. Urine sample to be provided.

## 2024-08-13 NOTE — ED PROVIDER NOTES
[facility]     Emergency Department     Faculty Attestation    I performed a history and physical examination of the patient and discussed management with the resident. I reviewed the resident’s note and agree with the documented findings and plan of care. Any areas of disagreement are noted on the chart. I was personally present for the key portions of any procedures. I have documented in the chart those procedures where I was not present during the key portions. I have reviewed the emergency nurses triage note. I agree with the chief complaint, past medical history, past surgical history, allergies, medications, social and family history as documented unless otherwise noted below. Documentation of the HPI, Physical Exam and Medical Decision Making performed by medical students or scribes is based on my personal performance of the HPI, PE and MDM. For Physician Assistant/ Nurse Practitioner cases/documentation I have personally evaluated this patient and have completed at least one if not all key elements of the E/M (history, physical exam, and MDM). Additional findings are as noted.    Vital signs:   Vitals:    08/11/24 1649   BP: (!) 105/58   Pulse:    Resp:    Temp:    SpO2:       Patient here with back pain. Cannot say if she fell or not due to short term memory loss associated with prior trauma. Ambulatory without issue. Tender in the midline over the thoracic and lumbar spine without crepitus or stepoffs. Strength is full and symmetrical. Sensation intact. Abdomen soft and non-tender.    XR THORACIC SPINE (3 VIEWS)    Result Date: 8/11/2024  EXAMINATION: THREE XRAY VIEWS OF THE THORACIC SPINE 8/11/2024 5:30 pm COMPARISON: 11/22/2022. HISTORY: ORDERING SYSTEM PROVIDED HISTORY: ? fall, pain TECHNOLOGIST PROVIDED HISTORY: ? fall, pain Reason for Exam: back pain FINDINGS: There is normal spinal alignment.  Vertebral body heights appear normal.  The pedicles are intact.  The paraspinal

## 2024-08-14 ENCOUNTER — HOSPITAL ENCOUNTER (OUTPATIENT)
Age: 24
Setting detail: OBSERVATION
Discharge: HOME | End: 2024-08-15
Attending: EMERGENCY MEDICINE | Admitting: PSYCHIATRY & NEUROLOGY
Payer: COMMERCIAL

## 2024-08-14 ENCOUNTER — APPOINTMENT (OUTPATIENT)
Dept: MRI IMAGING | Age: 24
End: 2024-08-14
Payer: COMMERCIAL

## 2024-08-14 DIAGNOSIS — R20.0 LOWER EXTREMITY NUMBNESS: ICD-10-CM

## 2024-08-14 DIAGNOSIS — M54.50 ACUTE EXACERBATION OF CHRONIC LOW BACK PAIN: Primary | ICD-10-CM

## 2024-08-14 DIAGNOSIS — G44.201 ACUTE INTRACTABLE TENSION-TYPE HEADACHE: ICD-10-CM

## 2024-08-14 DIAGNOSIS — G89.29 ACUTE EXACERBATION OF CHRONIC LOW BACK PAIN: Primary | ICD-10-CM

## 2024-08-14 PROBLEM — R29.898 BILATERAL LEG WEAKNESS: Status: ACTIVE | Noted: 2024-08-14

## 2024-08-14 LAB
BACTERIA URNS QL MICRO: ABNORMAL
BILIRUB UR QL STRIP: NEGATIVE
CASTS #/AREA URNS LPF: ABNORMAL /LPF (ref 0–8)
CLARITY UR: ABNORMAL
COLOR UR: YELLOW
EPI CELLS #/AREA URNS HPF: ABNORMAL /HPF (ref 0–5)
GLUCOSE UR STRIP-MCNC: NEGATIVE MG/DL
HGB UR QL STRIP.AUTO: NEGATIVE
KETONES UR STRIP-MCNC: NEGATIVE MG/DL
LEUKOCYTE ESTERASE UR QL STRIP: ABNORMAL
NITRITE UR QL STRIP: NEGATIVE
PH UR STRIP: 6 [PH] (ref 5–8)
PROT UR STRIP-MCNC: NEGATIVE MG/DL
RBC #/AREA URNS HPF: ABNORMAL /HPF (ref 0–2)
SP GR UR STRIP: 1.03 (ref 1–1.03)
UROBILINOGEN UR STRIP-ACNC: NORMAL EU/DL (ref 0–1)
WBC #/AREA URNS HPF: ABNORMAL /HPF (ref 0–5)

## 2024-08-14 PROCEDURE — 99223 1ST HOSP IP/OBS HIGH 75: CPT | Performed by: PSYCHIATRY & NEUROLOGY

## 2024-08-14 PROCEDURE — APPSS45 APP SPLIT SHARED TIME 31-45 MINUTES: Performed by: REGISTERED NURSE

## 2024-08-14 PROCEDURE — 80307 DRUG TEST PRSMV CHEM ANLYZR: CPT

## 2024-08-14 PROCEDURE — 6370000000 HC RX 637 (ALT 250 FOR IP): Performed by: STUDENT IN AN ORGANIZED HEALTH CARE EDUCATION/TRAINING PROGRAM

## 2024-08-14 PROCEDURE — 99285 EMERGENCY DEPT VISIT HI MDM: CPT

## 2024-08-14 PROCEDURE — 81001 URINALYSIS AUTO W/SCOPE: CPT

## 2024-08-14 PROCEDURE — 72146 MRI CHEST SPINE W/O DYE: CPT

## 2024-08-14 PROCEDURE — 6370000000 HC RX 637 (ALT 250 FOR IP)

## 2024-08-14 PROCEDURE — G0378 HOSPITAL OBSERVATION PER HR: HCPCS

## 2024-08-14 PROCEDURE — 72148 MRI LUMBAR SPINE W/O DYE: CPT

## 2024-08-14 RX ORDER — SODIUM CHLORIDE 0.9 % (FLUSH) 0.9 %
5-40 SYRINGE (ML) INJECTION PRN
Status: DISCONTINUED | OUTPATIENT
Start: 2024-08-14 | End: 2024-08-15 | Stop reason: HOSPADM

## 2024-08-14 RX ORDER — ONDANSETRON 2 MG/ML
4 INJECTION INTRAMUSCULAR; INTRAVENOUS EVERY 6 HOURS PRN
Status: DISCONTINUED | OUTPATIENT
Start: 2024-08-14 | End: 2024-08-15 | Stop reason: HOSPADM

## 2024-08-14 RX ORDER — SODIUM CHLORIDE 9 MG/ML
INJECTION, SOLUTION INTRAVENOUS PRN
Status: DISCONTINUED | OUTPATIENT
Start: 2024-08-14 | End: 2024-08-15 | Stop reason: HOSPADM

## 2024-08-14 RX ORDER — HYDROXYZINE HYDROCHLORIDE 10 MG/1
25 TABLET, FILM COATED ORAL 3 TIMES DAILY PRN
COMMUNITY

## 2024-08-14 RX ORDER — ACETAMINOPHEN 325 MG/1
650 TABLET ORAL EVERY 6 HOURS PRN
Status: DISCONTINUED | OUTPATIENT
Start: 2024-08-14 | End: 2024-08-15 | Stop reason: HOSPADM

## 2024-08-14 RX ORDER — POTASSIUM CHLORIDE 20 MEQ/1
40 TABLET, EXTENDED RELEASE ORAL PRN
Status: DISCONTINUED | OUTPATIENT
Start: 2024-08-14 | End: 2024-08-15 | Stop reason: HOSPADM

## 2024-08-14 RX ORDER — POLYETHYLENE GLYCOL 3350 17 G/17G
17 POWDER, FOR SOLUTION ORAL DAILY PRN
Status: DISCONTINUED | OUTPATIENT
Start: 2024-08-14 | End: 2024-08-15 | Stop reason: HOSPADM

## 2024-08-14 RX ORDER — ONDANSETRON 4 MG/1
4 TABLET, ORALLY DISINTEGRATING ORAL EVERY 8 HOURS PRN
Status: DISCONTINUED | OUTPATIENT
Start: 2024-08-14 | End: 2024-08-15 | Stop reason: HOSPADM

## 2024-08-14 RX ORDER — ACETAMINOPHEN 650 MG/1
650 SUPPOSITORY RECTAL EVERY 6 HOURS PRN
Status: DISCONTINUED | OUTPATIENT
Start: 2024-08-14 | End: 2024-08-15 | Stop reason: HOSPADM

## 2024-08-14 RX ORDER — CYCLOBENZAPRINE HCL 10 MG
10 TABLET ORAL ONCE
Status: COMPLETED | OUTPATIENT
Start: 2024-08-14 | End: 2024-08-14

## 2024-08-14 RX ORDER — SODIUM CHLORIDE 0.9 % (FLUSH) 0.9 %
5-40 SYRINGE (ML) INJECTION EVERY 12 HOURS SCHEDULED
Status: DISCONTINUED | OUTPATIENT
Start: 2024-08-14 | End: 2024-08-15 | Stop reason: HOSPADM

## 2024-08-14 RX ORDER — DIPHENHYDRAMINE HCL 25 MG
25 TABLET ORAL NIGHTLY PRN
Status: DISCONTINUED | OUTPATIENT
Start: 2024-08-14 | End: 2024-08-15 | Stop reason: HOSPADM

## 2024-08-14 RX ORDER — AMITRIPTYLINE HYDROCHLORIDE 10 MG/1
10 TABLET, FILM COATED ORAL NIGHTLY
Status: DISCONTINUED | OUTPATIENT
Start: 2024-08-14 | End: 2024-08-15 | Stop reason: HOSPADM

## 2024-08-14 RX ORDER — MAGNESIUM SULFATE IN WATER 40 MG/ML
2000 INJECTION, SOLUTION INTRAVENOUS PRN
Status: DISCONTINUED | OUTPATIENT
Start: 2024-08-14 | End: 2024-08-15 | Stop reason: HOSPADM

## 2024-08-14 RX ORDER — MIRTAZAPINE 15 MG/1
7.5 TABLET, FILM COATED ORAL NIGHTLY
Status: DISCONTINUED | OUTPATIENT
Start: 2024-08-14 | End: 2024-08-15 | Stop reason: HOSPADM

## 2024-08-14 RX ORDER — SUMATRIPTAN 50 MG/1
50 TABLET, FILM COATED ORAL
Status: DISCONTINUED | OUTPATIENT
Start: 2024-08-14 | End: 2024-08-15 | Stop reason: HOSPADM

## 2024-08-14 RX ORDER — POTASSIUM CHLORIDE 7.45 MG/ML
10 INJECTION INTRAVENOUS PRN
Status: DISCONTINUED | OUTPATIENT
Start: 2024-08-14 | End: 2024-08-15 | Stop reason: HOSPADM

## 2024-08-14 RX ORDER — LANOLIN ALCOHOL/MO/W.PET/CERES
400 CREAM (GRAM) TOPICAL NIGHTLY
Status: DISCONTINUED | OUTPATIENT
Start: 2024-08-14 | End: 2024-08-15 | Stop reason: HOSPADM

## 2024-08-14 RX ORDER — IBUPROFEN 400 MG/1
600 TABLET ORAL ONCE
Status: COMPLETED | OUTPATIENT
Start: 2024-08-14 | End: 2024-08-14

## 2024-08-14 RX ADMIN — IBUPROFEN 600 MG: 400 TABLET, FILM COATED ORAL at 12:53

## 2024-08-14 RX ADMIN — MIRTAZAPINE 7.5 MG: 15 TABLET, FILM COATED ORAL at 21:38

## 2024-08-14 RX ADMIN — CYCLOBENZAPRINE 10 MG: 10 TABLET, FILM COATED ORAL at 12:53

## 2024-08-14 ASSESSMENT — ENCOUNTER SYMPTOMS
CHEST TIGHTNESS: 0
DIARRHEA: 0
RHINORRHEA: 0
NAUSEA: 0
PHOTOPHOBIA: 0
BACK PAIN: 1
COUGH: 0
WHEEZING: 0
COLOR CHANGE: 0
VOMITING: 0
ABDOMINAL PAIN: 0
CONSTIPATION: 0
ABDOMINAL DISTENTION: 0
SHORTNESS OF BREATH: 0
CHOKING: 0
SORE THROAT: 0
BACK PAIN: 0

## 2024-08-14 ASSESSMENT — PAIN SCALES - GENERAL
PAINLEVEL_OUTOF10: 10
PAINLEVEL_OUTOF10: 6

## 2024-08-14 ASSESSMENT — PAIN DESCRIPTION - LOCATION: LOCATION: BACK

## 2024-08-14 ASSESSMENT — PAIN - FUNCTIONAL ASSESSMENT: PAIN_FUNCTIONAL_ASSESSMENT: 0-10

## 2024-08-14 NOTE — ED PROVIDER NOTES
[facility] ED     Emergency Department     Faculty Attestation    I performed a history and physical examination of the patient and discussed management with the resident. I reviewed the resident’s note and agree with the documented findings and plan of care. Any areas of disagreement are noted on the chart. I was personally present for the key portions of any procedures. I have documented in the chart those procedures where I was not present during the key portions. I have reviewed the emergency nurses triage note. I agree with the chief complaint, past medical history, past surgical history, allergies, medications, social and family history as documented unless otherwise noted below. For Physician Assistant/ Nurse Practitioner cases/documentation I have personally evaluated this patient and have completed at least one if not all key elements of the E/M (history, physical exam, and MDM). Additional findings are as noted.    1:15 PM EDT    Patient presents with inability to move her bilateral lower extremities and states that she is not able to feel anything to her lower extremities either.  Patient says she was riding in a car when the symptoms suddenly started.  Patient says she has a history of scoliosis and MS but am unable to find any documentation of either of those in the patient's chart.  Patient states that her primary care physician told her that if she would ever develop symptoms similar to this to come to the emergency room right away to make sure she is not paralyzed.  Patient says she has been having some increased back pain ever since she was seen here for an injury 2 weeks ago.  She denies any changes in bowel or bladder habits.  On my exam, patient was resting comfortably in the bed playing on an electronic device.  She appears well.  Patient is able to slightly move the right foot but was unable to move the left lower extremity.  The lower extremities appear normal without any  skin changes or wounds.  There is mild tenderness palpation of the lumbar midline.  Strength and sensation to the upper extremities is intact.  Will obtain MRI of the lumbar spine and have neurology see patient.      Jennifer Florian MD  Attending Emergency  Physician

## 2024-08-14 NOTE — ED NOTES
ED to inpatient nurses report      Chief Complaint:  Chief Complaint   Patient presents with    Back Pain     Present to ED from: home    MOA:     LOC: alert and orientated to name, place, date  Mobility: Independent  Oxygen Baseline: room air    Current needs required: room air   Pending ED orders: n/a  Present condition: a/ox4    Why did the patient come to the ED? Pt to ED via triage with c/o back pain and bilateral leg numbness. Pt reports history of paralysis below the waist and states her PCP told her to come to ED if she has numbness in lower extremities. Pt states she is unable to wiggle toes but did wiggle toes for writer. Per triage, pt ambulatory from car to triage. Pt yelled \"ow\" when writer attempted to take sock off. Pt is a/ox4, RR even and non labored on room air, call light in reach.   What is the plan? Neurosurg consult and recommendations   Any procedures or intervention occur? Labs, MRI  Any safety concerns?? Fall risk     Mental Status:  Level of Consciousness: Alert (0)    Psych Assessment:   Psychosocial  Psychosocial (WDL): Within Defined Limits  Vital signs   Vitals:    08/14/24 1228   BP: 111/70   Pulse: 88   Resp: 18   Temp: 98.1 °F (36.7 °C)   TempSrc: Oral   SpO2: 100%        Vitals:  Patient Vitals for the past 24 hrs:   BP Temp Temp src Pulse Resp SpO2   08/14/24 1228 111/70 98.1 °F (36.7 °C) Oral 88 18 100 %      Visit Vitals  /70   Pulse 88   Temp 98.1 °F (36.7 °C) (Oral)   Resp 18   SpO2 100%        LDAs:      Ambulatory Status:  Presents to emergency department  because of falls (Syncope, seizure, or loss of consciousness): No, Age > 70: No, Altered Mental Status, Intoxication with alcohol or substance confusion (Disorientation, impaired judgment, poor safety awaremess, or inability to follow instructions): No, Impaired Mobility: Ambulates or transfers with assistive devices or assistance; Unable to ambulate or transer.: No, Nursing Judgement: Yes    Diagnosis:  DISPOSITION  INHALE 2 PUFFS BY MOUTH EVERY 6 HOURS AS NEEDED FOR WHEEZING FOR SHORTNESS OF BREATH    AMITRIPTYLINE (ELAVIL) 10 MG TABLET    Take 1 tablet by mouth nightly    APIXABAN (ELIQUIS) 2.5 MG TABS TABLET    Take 1 tablet by mouth 2 times daily    ARIPIPRAZOLE ER (ABILIFY MAINTENA) 300 MG PRSY PREFILLED SYRINGE    Inject 1 Syringe into the muscle every 28 days    ASPIRIN 81 MG EC TABLET    Take 1 tablet by mouth daily    CYCLOBENZAPRINE (FLEXERIL) 5 MG TABLET    Take 1 tablet by mouth 2 times daily as needed for Muscle spasms    DIPHENHYDRAMINE (BENADRYL) 25 MG CAPSULE    Take 1 capsule by mouth nightly as needed    DIPHENHYDRAMINE (BENADRYL) 25 MG CAPSULE    Take 1 capsule by mouth every 4 hours as needed for Itching    ENOXAPARIN (LOVENOX) 40 MG/0.4ML    Inject 0.4 mLs into the skin daily    FEROSUL 325 (65 FE) MG TABLET    Take 1 tablet by mouth daily (with breakfast)    IBUPROFEN (ADVIL;MOTRIN) 600 MG TABLET    Take 1 tablet by mouth every 8 hours as needed for Pain    ISOPROPYL ALCOHOL (ALCOHOL WIPES) 70 % MISC    Apply 1 Units topically daily as needed (with Lovenox injections)    LIDOCAINE (LIDODERM) 5 %    Place 1 patch onto the skin daily for 5 days 12 hours on, 12 hours off.    MAGNESIUM OXIDE (MAG-OX) 400 MG TABLET    Take 1 tablet by mouth at bedtime    MIRTAZAPINE (REMERON) 7.5 MG TABLET    Take 1 tablet by mouth nightly    NARATRIPTAN (AMERGE) 2.5 MG TABLET    Take 1 tablet by mouth in the morning and at bedtime Take one pill every morning for 3 days    ONDANSETRON (ZOFRAN) 4 MG TABLET    Take 1 tablet by mouth daily as needed for Nausea or Vomiting    SUMATRIPTAN (IMITREX) 50 MG TABLET    Take 1 tablet by mouth once as needed for Migraine    VITAMIN B-6 (PYRIDOXINE) 50 MG TABLET    Take 0.5 tablets by mouth in the morning, at noon, and at bedtime for 180 doses     Orders Placed This Encounter   Medications    ibuprofen (ADVIL;MOTRIN) tablet 600 mg    cyclobenzaprine (FLEXERIL) tablet 10 mg       SURGICAL

## 2024-08-14 NOTE — ED NOTES
Pt to ED via triage with c/o back pain and bilateral leg numbness. Pt reports history of paralysis below the waist and states her PCP told her to come to ED if she has numbness in lower extremities. Pt states she is unable to wiggle toes but did wiggle toes for writer. Per triage, pt ambulatory from car to triage. Pt yelled \"ow\" when writer attempted to take sock off. Pt is a/ox4, RR even and non labored on room air, call light in reach.

## 2024-08-14 NOTE — ED NOTES
Pt calls out and states someone told her she can eat and is requesting food. Writer asked Dr. Knight is pt can eat, he states pt is to remain npo at this time. Pt informed of this, pt responds with \"what the hell.\"

## 2024-08-14 NOTE — ED PROVIDER NOTES
600 MG TABLET    Take 1 tablet by mouth every 8 hours as needed for Pain    ISOPROPYL ALCOHOL (ALCOHOL WIPES) 70 % MISC    Apply 1 Units topically daily as needed (with Lovenox injections)    LIDOCAINE (LIDODERM) 5 %    Place 1 patch onto the skin daily for 5 days 12 hours on, 12 hours off.    MAGNESIUM OXIDE (MAG-OX) 400 MG TABLET    Take 1 tablet by mouth at bedtime    MIRTAZAPINE (REMERON) 7.5 MG TABLET    Take 1 tablet by mouth nightly    NARATRIPTAN (AMERGE) 2.5 MG TABLET    Take 1 tablet by mouth in the morning and at bedtime Take one pill every morning for 3 days    ONDANSETRON (ZOFRAN) 4 MG TABLET    Take 1 tablet by mouth daily as needed for Nausea or Vomiting    SUMATRIPTAN (IMITREX) 50 MG TABLET    Take 1 tablet by mouth once as needed for Migraine    VITAMIN B-6 (PYRIDOXINE) 50 MG TABLET    Take 0.5 tablets by mouth in the morning, at noon, and at bedtime for 180 doses       Encounter Medications  Orders Placed This Encounter   Medications    ibuprofen (ADVIL;MOTRIN) tablet 600 mg    cyclobenzaprine (FLEXERIL) tablet 10 mg       ALLERGIES     is allergic to latex, penicillins, and vyvanse [lisdexamfetamine].      RECENT VITALS:   Temp: 98.1 °F (36.7 °C),  Pulse: 88, Respirations: 18, BP: 111/70    RADIOLOGY:   MRI THORACIC SPINE WO CONTRAST   Final Result   No fracture or subluxation in the thoracic spine.      No significant disc herniation, spinal canal or foraminal stenosis.         MRI LUMBAR SPINE WO CONTRAST   Preliminary Result   Minimal narrowing of the neural foramina at several levels as discussed   above.  No significant disc abnormality or stenosis of the thecal sac.             LABS:  Labs Reviewed - No data to display        PLAN/ TASKS OUTSTANDING     Pt with sudden loss of the use of her legs. Pt states hx of MS but undocumented. L spine shows some narrowing . Pending Neuro surgical evaluation and recommendations. Neuro has seen. Will follow their recs.         (Please note that portions  of this note were completed with a voice recognition program.  Efforts were made to edit the dictations but occasionally words are mis-transcribed.)    Philippe Woo MD, MD,   Attending Emergency Physician       Philippe Woo MD  08/14/24 2246
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
## 2024-08-15 ENCOUNTER — TELEPHONE (OUTPATIENT)
Dept: ONCOLOGY | Age: 24
End: 2024-08-15

## 2024-08-15 VITALS
RESPIRATION RATE: 16 BRPM | TEMPERATURE: 97.7 F | WEIGHT: 137 LBS | BODY MASS INDEX: 22.02 KG/M2 | SYSTOLIC BLOOD PRESSURE: 116 MMHG | DIASTOLIC BLOOD PRESSURE: 64 MMHG | OXYGEN SATURATION: 98 % | HEIGHT: 66 IN | HEART RATE: 68 BPM

## 2024-08-15 LAB
AMPHET UR QL SCN: NEGATIVE
BARBITURATES UR QL SCN: NEGATIVE
BENZODIAZ UR QL: NEGATIVE
CANNABINOIDS UR QL SCN: NEGATIVE
COCAINE UR QL SCN: NEGATIVE
FENTANYL UR QL: NEGATIVE
METHADONE UR QL: NEGATIVE
OPIATES UR QL SCN: NEGATIVE
OXYCODONE UR QL SCN: NEGATIVE
PCP UR QL SCN: NEGATIVE
TEST INFORMATION: NORMAL

## 2024-08-15 PROCEDURE — APPSS60 APP SPLIT SHARED TIME 46-60 MINUTES

## 2024-08-15 PROCEDURE — 99239 HOSP IP/OBS DSCHRG MGMT >30: CPT | Performed by: PSYCHIATRY & NEUROLOGY

## 2024-08-15 PROCEDURE — 87086 URINE CULTURE/COLONY COUNT: CPT

## 2024-08-15 PROCEDURE — 99222 1ST HOSP IP/OBS MODERATE 55: CPT | Performed by: PSYCHIATRY & NEUROLOGY

## 2024-08-15 PROCEDURE — G0378 HOSPITAL OBSERVATION PER HR: HCPCS

## 2024-08-15 PROCEDURE — 6370000000 HC RX 637 (ALT 250 FOR IP)

## 2024-08-15 PROCEDURE — 97530 THERAPEUTIC ACTIVITIES: CPT

## 2024-08-15 PROCEDURE — 97161 PT EVAL LOW COMPLEX 20 MIN: CPT

## 2024-08-15 RX ORDER — NITROFURANTOIN 25; 75 MG/1; MG/1
100 CAPSULE ORAL EVERY 12 HOURS SCHEDULED
Qty: 20 CAPSULE | Refills: 0 | Status: CANCELLED | OUTPATIENT
Start: 2024-08-15 | End: 2024-08-25

## 2024-08-15 RX ORDER — NITROFURANTOIN 25; 75 MG/1; MG/1
100 CAPSULE ORAL EVERY 12 HOURS SCHEDULED
Qty: 9 CAPSULE | Refills: 0 | Status: SHIPPED | OUTPATIENT
Start: 2024-08-15 | End: 2024-08-20

## 2024-08-15 RX ORDER — NITROFURANTOIN 25; 75 MG/1; MG/1
100 CAPSULE ORAL EVERY 12 HOURS SCHEDULED
Status: DISCONTINUED | OUTPATIENT
Start: 2024-08-15 | End: 2024-08-15 | Stop reason: HOSPADM

## 2024-08-15 RX ORDER — SUMATRIPTAN 50 MG/1
50 TABLET, FILM COATED ORAL
Qty: 9 TABLET | Refills: 2 | Status: SHIPPED | OUTPATIENT
Start: 2024-08-15 | End: 2024-08-15

## 2024-08-15 RX ORDER — LIDOCAINE 4 G/G
1 PATCH TOPICAL DAILY
Status: DISCONTINUED | OUTPATIENT
Start: 2024-08-15 | End: 2024-08-15 | Stop reason: HOSPADM

## 2024-08-15 RX ADMIN — NITROFURANTOIN MONOHYDRATE/MACROCRYSTALS 100 MG: 75; 25 CAPSULE ORAL at 11:29

## 2024-08-15 ASSESSMENT — ENCOUNTER SYMPTOMS
DIARRHEA: 0
SHORTNESS OF BREATH: 0
CHOKING: 0
RHINORRHEA: 0
COLOR CHANGE: 0
WHEEZING: 0
PHOTOPHOBIA: 0
ABDOMINAL DISTENTION: 0
ABDOMINAL PAIN: 0
COUGH: 0
SORE THROAT: 0
BACK PAIN: 0
CONSTIPATION: 0
VOMITING: 0
NAUSEA: 0

## 2024-08-15 NOTE — PROGRESS NOTES
Occupational Therapy    [facility]  Occupational Therapy Not Seen Note    DATE: 8/15/2024    NAME: Marie Golden  MRN: [id number]   : 2000      Patient not seen this date for Occupational Therapy due to:    Patient independent with ADLs and functional tasks with no acute OT needs. Will defer OT evaluation at this time. Please reorder OT if future needs arise.       Electronically signed by Dahiana Guerrero OT on 8/15/2024 at 3:03 PM

## 2024-08-15 NOTE — TELEPHONE ENCOUNTER
Called patient to reschedule today's appointment since she was still admitted at [facility]. Pt states she does not need a hematologist. Pt states \"her blood is fine she told her Neurologist she did not want to see a hematologist\". Cancelled 8/15 appointment and told pt to call back if she ever wants to schedule another consult appointment.

## 2024-08-15 NOTE — DISCHARGE SUMMARY
times daily      !! diphenhydrAMINE (BENADRYL) 25 MG capsule Take 1 capsule by mouth nightly as needed      magnesium oxide (MAG-OX) 400 MG tablet Take 1 tablet by mouth at bedtime  Qty: 30 tablet, Refills: 3    Associated Diagnoses: Acute intractable tension-type headache      cyclobenzaprine (FLEXERIL) 5 MG tablet Take 1 tablet by mouth 2 times daily as needed for Muscle spasms  Qty: 14 tablet, Refills: 0      ondansetron (ZOFRAN) 4 MG tablet Take 1 tablet by mouth daily as needed for Nausea or Vomiting  Qty: 30 tablet, Refills: 0      ARIPiprazole er (ABILIFY MAINTENA) 300 MG PRSY prefilled syringe Inject 1 Syringe into the muscle every 28 days      vitamin B-6 (PYRIDOXINE) 50 MG tablet Take 0.5 tablets by mouth in the morning, at noon, and at bedtime for 180 doses  Qty: 90 tablet, Refills: 0      enoxaparin (LOVENOX) 40 MG/0.4ML Inject 0.4 mLs into the skin daily  Qty: 12 mL, Refills: 5      Isopropyl Alcohol (ALCOHOL WIPES) 70 % MISC Apply 1 Units topically daily as needed (with Lovenox injections)  Qty: 100 each, Refills: 5       !! - Potential duplicate medications found. Please discuss with provider.          Activity: activity as tolerated    Restrictions: none    Diet: regular diet    Follow-up:    No follow-up provider specified.    Follow up labs: none    Follow up imaging: none    Note that over 30 minutes was spent in preparing discharge papers, discussing discharge with patient, medication review, etc.      Gayathri Garza MD,  Neurology Resident PGY-4  Department of Neurology  [place], [place]  8/15/2024, 10:17 AM

## 2024-08-15 NOTE — PROGRESS NOTES
rehabilitation services?: Yes  Response To Previous Treatment: Not applicable  Family / Caregiver Present: No  Follows Commands: Within Functional Limits  General Comment  Comments: RN and pt agreeable to PT. pt alert in bed upon arrival.  Subjective  Subjective: Pt denies pain, does endorse diminished sensation of BLE from waist down. Pt reports sensation is improving this admission, able to localize light touch PANCHITO.         Social/Functional History  Social/Functional History  Lives With: Friend(s) (Boyfriend and boyfriends friends)  Type of Home: House  Home Layout: One level  Home Access: Level entry  Bathroom Shower/Tub: Tub/Shower unit  Bathroom Equipment: None  Home Equipment: None  ADL Assistance: Independent  Homemaking Assistance: Independent  Homemaking Responsibilities: Yes  Ambulation Assistance: Independent  Transfer Assistance: Independent  Active : No  Occupation: Student: College  Type of Occupation: [employer] and [employer]. Going to school to learn for cooking.  Leisure & Hobbies: Music, coloring, baking  Additional Comments: Boyfriend is retired, able to assist as needed.  Vision/Hearing  Vision  Vision: Impaired  Vision Exceptions: Wears glasses for distance  Hearing  Hearing: Exceptions to WFL  Hearing Exceptions:  (Hard of hearing out of R ear)    Cognition   Orientation  Overall Orientation Status: Within Normal Limits  Cognition  Overall Cognitive Status: Exceptions  Attention Span: Attends with cues to redirect  Insights: Decreased awareness of deficits  Cognition Comment: Verbose     Objective   AROM RLE (degrees)  RLE AROM: WFL  AROM LLE (degrees)  LLE AROM : WFL  AROM RUE (degrees)  RUE AROM : WFL  AROM LUE (degrees)  LUE AROM : WFL  Strength RLE  Strength RLE: WFL  Comment: Grossly 5/5  Strength LLE  Strength LLE: WFL  Comment: Grossly 5/5  Strength RUE  Strength RUE: WFL  Comment: Grossly 5/5  Strength LUE  Strength LUE: WFL  Comment: Grossly 5/5  Strength Other  Other:   strength fair, symmetric.           Bed mobility  Supine to Sit: Independent  Bed Mobility Comments: HOB elevated ~30 degrees. Pt sitting EOB upon writers exit.  Transfers  Sit to Stand: Independent  Stand to Sit: Independent  Comment: STS performed from bed w/out AD.  Ambulation  Surface: Level tile  Device: No Device  Assistance: Independent  Quality of Gait: Pt amb w/ adequate gait speed, symmetric step length, no LOB.  Distance: 500'  Comments: Pt performed toe walking and heel walking w/out gait path deviation or instability.  Stairs/Curb  Stairs?: Yes  Stairs  # Steps : 20  Stairs Height: 6\"  Rails: Right ascending  Device: No Device  Assistance: Independent  Comment: Reciprocal stepping in ascent and descent w/ good eccentric control in descent.     Balance  Posture: Good  Sitting - Static: Good  Sitting - Dynamic: Good  Standing - Static: Good  Standing - Dynamic: Good  Comments: Standing Balance assessed w/out AD         AM-PAC - Mobility  AM-PAC Basic Mobility - Inpatient   How much help is needed turning from your back to your side while in a flat bed without using bedrails?: None  How much help is needed moving from lying on your back to sitting on the side of a flat bed without using bedrails?: None  How much help is needed moving to and from a bed to a chair?: None  How much help is needed standing up from a chair using your arms?: None  How much help is needed walking in hospital room?: None  How much help is needed climbing 3-5 steps with a railing?: None  AM-PAC Inpatient Mobility Raw Score : 24  AM-PAC Inpatient T-Scale Score : 61.14  Mobility Inpatient CMS 0-100% Score: 0  Mobility Inpatient CMS G-Code Modifier : CH       Goals  Short Term Goals  Time Frame for Short Term Goals: D/C PT       Education  Patient Education  Education Given To: Patient  Education Provided: Role of Therapy;Plan of Care  Education Method: Demonstration;Verbal  Barriers to Learning: None  Education Outcome: Verbalized

## 2024-08-15 NOTE — CARE COORDINATION
DISCHARGE PLANNING EVALUATION: OP/OBSERVATION        8/15/24, 2:18 PM EDT    Marie Golden         Location: OBS 17/17-1   Reason for hospitalization: Lower extremity numbness [R20.0]  Bilateral leg weakness [R29.898]  Acute exacerbation of chronic low back pain [M54.50, G89.29]     CM Services requested for transitional needs.     PCP: No primary care provider on file.    Transportation/Food Security/Housekeeping Addressed:  No issues identified.     Equipment needs: None     Transition plan: Home, has a ride. Plan to discharge today.

## 2024-08-15 NOTE — PLAN OF CARE
Problem: Discharge Planning  Goal: Discharge to home or other facility with appropriate resources  8/15/2024 1503 by Peterson James RN  Outcome: Adequate for Discharge  8/15/2024 0111 by Renetta Eddy RN  Outcome: Progressing     Problem: Pain  Goal: Verbalizes/displays adequate comfort level or baseline comfort level  8/15/2024 1503 by Peterson James RN  Outcome: Adequate for Discharge  8/15/2024 0111 by Renetta Eddy RN  Outcome: Progressing     Problem: Safety - Adult  Goal: Free from fall injury  8/15/2024 1503 by Peterson James RN  Outcome: Adequate for Discharge  8/15/2024 0111 by Renetta Eddy RN  Outcome: Progressing

## 2024-08-15 NOTE — CONSULTS
Department of Psychiatry  Consult Service   Psychiatric Assessment        REASON FOR CONSULT: Neurological disorder history of PTSD and bipolar    CONSULTING PHYSICIAN: EMILIA Garza    History obtained from: pt, RN, EMR    HISTORY OF PRESENT ILLNESS:          The patient is a 23 y.o. female with significant psychiatric history of PTSD, bipolar disorder who is admitted medically for bilateral leg weakness.  Patient was seen in the ED for complaints of bilateral lower extremity weakness and numbness for the past day.  She denies any recent injury.  She states she did fall while at the [facility] last on August 2 but did not hit her head or complaining of any neck or spinal injury.  She states that she had fallen on her wrist..  No loss of consciousness. Denies any bladder or bowel incontinence. Currently getting treatment for urinary tract infection with Macrobid.  She is alert and oriented x 4.  Patient states that she has no concerns with psychiatric care.  She appeared irritable and loud when speaking to this author.  She states \"I told them I do not need a psychiatrist.\"  Patient states she follows with \"Rei\" a psychiatrist at [facility].  She states she has an appointment in September.  She is denying any suicidal or homicidal ideation.  She denies any paranoia or delusions.  She does have a previous history of experiencing hallucinations but is adamantly denying this.    The patient does have a significant history of manic symptoms and is diagnosed with bipolar disorder.  She supposed to be on Abilify long-acting injectable but has not been on it for the past couple of months since having a baby 2 months ago.  She states that she is not breast-feeding.  She explains the baby is currently with her cousin and safe.  She explains that [facility] has been monitoring her because someone told them that she is homeless.  She states she is not homeless and she has been staying with a friend.  It is noted that the patient does have  tablet 40 mEq, 40 mEq, Oral, PRN **OR** potassium chloride 10 mEq/100 mL IVPB (Peripheral Line), 10 mEq, IntraVENous, PRN  magnesium sulfate 2000 mg in 50 mL IVPB premix, 2,000 mg, IntraVENous, PRN  ondansetron (ZOFRAN-ODT) disintegrating tablet 4 mg, 4 mg, Oral, Q8H PRN **OR** ondansetron (ZOFRAN) injection 4 mg, 4 mg, IntraVENous, Q6H PRN  polyethylene glycol (GLYCOLAX) packet 17 g, 17 g, Oral, Daily PRN  acetaminophen (TYLENOL) tablet 650 mg, 650 mg, Oral, Q6H PRN **OR** acetaminophen (TYLENOL) suppository 650 mg, 650 mg, Rectal, Q6H PRN     Physical:    Vitals:  /64   Pulse 68   Temp 97.7 °F (36.5 °C) (Oral)   Resp 16   Ht 1.676 m (5' 6\")   Wt 62.1 kg (137 lb) Comment: per pt  LMP 08/01/2023 (Exact Date)   SpO2 98%   BMI 22.11 kg/m²      Qtc: 390    Neuro Exam:   Muscle Strength & Tone: full ROM, normal    Involuntary Movements: No    Mental Status Examination:  Level of consciousness:  Awake and alert  Appearance: hospital attire, lying in bed, fair grooming  Behavior/Motor:  no abnormalities noted  Attitude toward examiner:  cooperative, somewhat irritable  Speech:  Spontaneous, loud  Mood: \"I am fine\"  Affect: mood congruent  Thought processes:  Linear, goal directed, coherent  Thought content: Denies suicidal ideations   Denies homicidal ideations    Denies hallucinations   Denies delusions  Cognition:  Oriented to self, situation, location, date  Concentration clinically adequate  Memory age appropriate  Insight & Judgment:  fair    DSM-5 DIAGNOSIS: Acute stress disorder  Rule out conversion disorder        Stressors     Severity of stressors is mild  Source of stressors include:  Other psychosocial and environmental stressors    Plan:      Patient does not meet criteria for admission to the [facility].  Once medically stable please have patient follow up with outpatient psychiatrist in the next couple weeks.  Would recommend patient restarts Abilify.  No Medication Changes Today  Additional

## 2024-08-15 NOTE — PROGRESS NOTES
[facility] Neurology   IN-PATIENT SERVICE   [facility]    Progress Note             Date:   8/15/2024  Patient name:  Marie Golden  Date of admission:  8/14/2024 12:23 PM  MRN:   [id number]  Account:  [id number]  YOB: 2000  PCP:    No primary care provider on file.  Room:   OBS [address] [place] [address]  Code Status:    Full Code    Chief Complaint:     Chief Complaint   Patient presents with    Back Pain       Interval hx:     The patient was seen and examined at bedside. Is vitally stable, alert and oriented x 3. No acute events overnight.  The patient stated that     UDS was negative   UA suggestive of UTI   Will start Rocephin   Urine cultures were sent     MRI L and T spine were negative for spinal cord pathology     Slight improvement of in strength   Able to feel light touch     Pending PT OT eval     Likely dc today     Not interested in seeing a psychiatrist     Brief History of Present Illness:     The patient is a 23 y.o. female with significant past medical history of seizure-like episodes about a year ago, bipolar, PTSD, autism, migraine, PE off AC who presents with sudden onset back pain with bilateral lower extremity weakness and numbness     Patient was in her usual state of health until she noticed that her back has locked and suddenly while she was in a car with a coworker, then noticed sudden onset gradually weakness and a descending pattern, followed by abrupt pins and needle sensation from back down to toes, feels the weakness and abnormal sensation mostly on the left lower extremity.  Reports having mild headache, not having visual disturbance at this moment, however does report having them in the past.  Patient did report having similar events in the past when she was in school few years ago.  States that she had an x-ray and was told that she might have MS.  Patient denies any visual disturbance, no weakness of upper extremity, no tingling or numbness of upper extremities,   -     Midline tongue, no atrophy    MOTOR FUNCTION: RUE: Significant for good strength of grade 5/5 in proximal and distal muscle groups   LUE: Significant for good strength of grade 5/5 in proximal and distal muscle groups   RLE: Significant for good strength of grade 5-/5 in proximal and distal muscle groups   LLE: Significant for good strength of grade 5-/5 in proximal and distal muscle groups      Normal bulk, normal tone and no involuntary movements, no tremor   SENSORY FUNCTION:  Decrease sensation to light touch bilateral lower extremities    CEREBELLAR FUNCTION:  Intact fine motor control over upper limbs and lower limbs   REFLEX FUNCTION:  Symmetric in upper and lower extremities, no Babinski sign   STATION and GAIT  Not assessed        Investigations:      Laboratory Testing:  Recent Results (from the past 24 hour(s))   DRUG SCREEN MULTI URINE    Collection Time: 08/14/24  8:59 PM   Result Value Ref Range    Amphetamine Screen, Ur NEGATIVE NEGATIVE    Barbiturate Screen, Ur NEGATIVE NEGATIVE    Benzodiazepine Screen, Urine NEGATIVE NEGATIVE    Cocaine Metabolite, Urine NEGATIVE NEGATIVE    Methadone Screen, Urine NEGATIVE NEGATIVE    Opiates, Urine NEGATIVE NEGATIVE    Phencyclidine, Urine NEGATIVE NEGATIVE    Cannabinoid Scrn, Ur NEGATIVE NEGATIVE    Oxycodone Screen, Ur NEGATIVE NEGATIVE    Fentanyl, Ur NEGATIVE NEGATIVE    Test Information       Assay provides rapid clinical screening only.  Presumptive positive results for legal purposes should be confirmed by another method.  To request confirmation, please call the lab within 7 days of sample submission.   Urinalysis with Microscopic    Collection Time: 08/14/24  8:59 PM   Result Value Ref Range    Color, UA Yellow Yellow    Turbidity UA Cloudy (A) Clear    Glucose, Ur NEGATIVE NEGATIVE mg/dL    Bilirubin, Urine NEGATIVE NEGATIVE    Ketones, Urine NEGATIVE NEGATIVE mg/dL    Specific Gravity, UA 1.028 1.005 - 1.030    Urine Hgb NEGATIVE NEGATIVE

## 2024-08-15 NOTE — PROGRESS NOTES
--  NO ACUTE NEUROSURGICAL INTERVENTION AT THIS TIME  No follow up required at discharge       NEUROSURGERY TO SIGN OFF     Please contact Neurosurgery with any questions.

## 2024-08-16 LAB
MICROORGANISM SPEC CULT: NORMAL
SERVICE CMNT-IMP: NORMAL
SPECIMEN DESCRIPTION: NORMAL

## 2024-08-18 ENCOUNTER — HOSPITAL ENCOUNTER (EMERGENCY)
Age: 24
Discharge: HOME | End: 2024-08-18
Attending: EMERGENCY MEDICINE
Payer: COMMERCIAL

## 2024-08-18 VITALS
RESPIRATION RATE: 18 BRPM | TEMPERATURE: 97.3 F | OXYGEN SATURATION: 96 % | SYSTOLIC BLOOD PRESSURE: 138 MMHG | DIASTOLIC BLOOD PRESSURE: 79 MMHG | HEART RATE: 74 BPM

## 2024-08-18 DIAGNOSIS — N30.00 ACUTE CYSTITIS WITHOUT HEMATURIA: Primary | ICD-10-CM

## 2024-08-18 LAB
ALBUMIN SERPL-MCNC: 4.8 G/DL (ref 3.5–5.2)
ALBUMIN/GLOB SERPL: 1 {RATIO} (ref 1–2.5)
ALP SERPL-CCNC: 91 U/L (ref 35–104)
ALT SERPL-CCNC: 32 U/L (ref 10–35)
ANION GAP SERPL CALCULATED.3IONS-SCNC: 13 MMOL/L (ref 9–16)
AST SERPL-CCNC: 35 U/L (ref 10–35)
BACTERIA URNS QL MICRO: ABNORMAL
BASOPHILS # BLD: <0.03 K/UL (ref 0–0.2)
BASOPHILS NFR BLD: 0 % (ref 0–2)
BILIRUB SERPL-MCNC: 0.7 MG/DL (ref 0–1.2)
BILIRUB UR QL STRIP: NEGATIVE
BUN SERPL-MCNC: 14 MG/DL (ref 6–20)
CALCIUM SERPL-MCNC: 10.9 MG/DL (ref 8.6–10.4)
CASTS #/AREA URNS LPF: ABNORMAL /LPF (ref 0–2)
CASTS #/AREA URNS LPF: ABNORMAL /LPF (ref 0–2)
CHLORIDE SERPL-SCNC: 103 MMOL/L (ref 98–107)
CLARITY UR: ABNORMAL
CO2 SERPL-SCNC: 23 MMOL/L (ref 20–31)
COLOR UR: ABNORMAL
CREAT SERPL-MCNC: 0.7 MG/DL (ref 0.5–0.9)
EOSINOPHIL # BLD: 0.07 K/UL (ref 0–0.44)
EOSINOPHILS RELATIVE PERCENT: 1 % (ref 1–4)
EPI CELLS #/AREA URNS HPF: ABNORMAL /HPF (ref 0–5)
ERYTHROCYTE [DISTWIDTH] IN BLOOD BY AUTOMATED COUNT: 13.2 % (ref 11.8–14.4)
GFR, ESTIMATED: >90 ML/MIN/1.73M2
GLUCOSE SERPL-MCNC: 83 MG/DL (ref 74–99)
GLUCOSE UR STRIP-MCNC: NEGATIVE MG/DL
HCG SERPL QL: NEGATIVE
HCT VFR BLD AUTO: 40.8 % (ref 36.3–47.1)
HGB BLD-MCNC: 13.3 G/DL (ref 11.9–15.1)
HGB UR QL STRIP.AUTO: NEGATIVE
IMM GRANULOCYTES # BLD AUTO: <0.03 K/UL (ref 0–0.3)
IMM GRANULOCYTES NFR BLD: 0 %
KETONES UR STRIP-MCNC: ABNORMAL MG/DL
LEUKOCYTE ESTERASE UR QL STRIP: ABNORMAL
LIPASE SERPL-CCNC: 21 U/L (ref 13–60)
LYMPHOCYTES NFR BLD: 2.13 K/UL (ref 1.1–3.7)
LYMPHOCYTES RELATIVE PERCENT: 34 % (ref 25–45)
MCH RBC QN AUTO: 26.5 PG (ref 25.2–33.5)
MCHC RBC AUTO-ENTMCNC: 32.6 G/DL (ref 28.4–34.8)
MCV RBC AUTO: 81.4 FL (ref 82.6–102.9)
MONOCYTES NFR BLD: 0.56 K/UL (ref 0.1–1.4)
MONOCYTES NFR BLD: 9 % (ref 2–8)
MUCOUS THREADS URNS QL MICRO: ABNORMAL
NEUTROPHILS NFR BLD: 56 % (ref 34–64)
NEUTS SEG NFR BLD: 3.43 K/UL (ref 1.5–8.1)
NITRITE UR QL STRIP: NEGATIVE
NRBC BLD-RTO: 0 PER 100 WBC
PH UR STRIP: 5.5 [PH] (ref 5–8)
PLATELET # BLD AUTO: 303 K/UL (ref 138–453)
PMV BLD AUTO: 9.6 FL (ref 8.1–13.5)
POTASSIUM SERPL-SCNC: 3.6 MMOL/L (ref 3.7–5.3)
PROT SERPL-MCNC: 8.1 G/DL (ref 6.6–8.7)
PROT UR STRIP-MCNC: ABNORMAL MG/DL
RBC # BLD AUTO: 5.01 M/UL (ref 3.95–5.11)
RBC # BLD: ABNORMAL 10*6/UL
RBC #/AREA URNS HPF: ABNORMAL /HPF (ref 0–2)
SODIUM SERPL-SCNC: 139 MMOL/L (ref 136–145)
SP GR UR STRIP: 1.04 (ref 1–1.03)
UROBILINOGEN UR STRIP-ACNC: NORMAL EU/DL (ref 0–1)
WBC #/AREA URNS HPF: ABNORMAL /HPF (ref 0–5)
WBC OTHER # BLD: 6.2 K/UL (ref 4.5–13.5)

## 2024-08-18 PROCEDURE — 81001 URINALYSIS AUTO W/SCOPE: CPT

## 2024-08-18 PROCEDURE — 6370000000 HC RX 637 (ALT 250 FOR IP): Performed by: STUDENT IN AN ORGANIZED HEALTH CARE EDUCATION/TRAINING PROGRAM

## 2024-08-18 PROCEDURE — 83690 ASSAY OF LIPASE: CPT

## 2024-08-18 PROCEDURE — 84703 CHORIONIC GONADOTROPIN ASSAY: CPT

## 2024-08-18 PROCEDURE — 80053 COMPREHEN METABOLIC PANEL: CPT

## 2024-08-18 PROCEDURE — 99283 EMERGENCY DEPT VISIT LOW MDM: CPT | Performed by: EMERGENCY MEDICINE

## 2024-08-18 PROCEDURE — 85025 COMPLETE CBC W/AUTO DIFF WBC: CPT

## 2024-08-18 RX ORDER — METHOCARBAMOL 500 MG/1
1000 TABLET, FILM COATED ORAL ONCE
Status: COMPLETED | OUTPATIENT
Start: 2024-08-18 | End: 2024-08-18

## 2024-08-18 RX ORDER — CEPHALEXIN 500 MG/1
500 CAPSULE ORAL ONCE
Status: COMPLETED | OUTPATIENT
Start: 2024-08-18 | End: 2024-08-18

## 2024-08-18 RX ORDER — CEPHALEXIN 500 MG/1
500 CAPSULE ORAL 2 TIMES DAILY
Qty: 14 CAPSULE | Refills: 0 | Status: SHIPPED | OUTPATIENT
Start: 2024-08-18 | End: 2024-08-25

## 2024-08-18 RX ADMIN — CEPHALEXIN 500 MG: 500 CAPSULE ORAL at 20:08

## 2024-08-18 RX ADMIN — METHOCARBAMOL 1000 MG: 500 TABLET ORAL at 16:36

## 2024-08-18 ASSESSMENT — PAIN SCALES - GENERAL
PAINLEVEL_OUTOF10: 8
PAINLEVEL_OUTOF10: 10

## 2024-08-18 ASSESSMENT — PAIN - FUNCTIONAL ASSESSMENT: PAIN_FUNCTIONAL_ASSESSMENT: 0-10

## 2024-08-18 NOTE — ED PROVIDER NOTES
[facility] ED  Emergency Department Encounter  Emergency Medicine Resident     Pt Name:Marie Golden  MRN: [id number]  Birthdate 2000  Date of evaluation: 24  PCP:  No primary care provider on file.  Note Started: 4:09 PM EDT      CHIEF COMPLAINT       Chief Complaint   Patient presents with    Spasms     ABD       HISTORY OF PRESENT ILLNESS  (Location/Symptom, Timing/Onset, Context/Setting, Quality, Duration, Modifying Factors, Severity.)      Marie Golden is a 23 y.o. female with a past medical history of seizure-like episodes about a year ago, bipolar, PTSD, autism, migraine, PE off AC who presents with abdominal and back cramping that started suddenly about 1.5 hours ago. She states she was on the phone arguing with someone about court when she felt L sided abdominal cramping. She was feeling very stressed out about it and the pain started to go around her back and towards her right side. She denies any injury to the area. She has not changed any activities. She denies fever, chills, change to stool frequency or caliber. She has not taken anything to help with the pain.   She reports she has not been taking prescribed antibiotic for UTI because it is at a friend's house that she does not currently have access to    PAST MEDICAL / SURGICAL / SOCIAL / FAMILY HISTORY      has a past medical history of Anxiety, ASCUS w/ High Risk HPV 23, Autism, Bipolar 1 disorder (HCC), Chlamydia, cHTN, Deep vein thrombosis (HCC), Depression, Depression with suicidal ideation, Heart defect, Heart murmur, Migraine, PTSD (post-traumatic stress disorder), and Thyroid disease.     has a past surgical history that includes  section.    Social History     Socioeconomic History    Marital status: Single     Spouse name: Not on file    Number of children: Not on file    Years of education: Not on file    Highest education level: Not on file   Occupational History    Not on file   Tobacco Use    
[facility]  FACULTY HANDOFF       Handoff taken on the following patient from prior Attending Physician:  Pt Name: Marie Golden  PCP:  No primary care provider on file.    Attestation  I was available and discussed any additional care issues that arose and coordinated the management plans with the resident(s) caring for the patient during my duty period. Any areas of disagreement with resident's documentation of care or procedures are noted on the chart. I was personally present for the key portions of any/all procedures during my duty period. I have documented in the chart those procedures where I was not present during the key portions.           Vinh Marie MD  08/18/24 3709    
[facility]  Attending Emergency Physician        David Owusu MD  08/18/24 1918

## 2024-08-19 NOTE — DISCHARGE INSTRUCTIONS
You came to the emergency department due to sided back pain.  We juve some labs and all of this came back normal.  We also gave you a muscle relaxer to help with the spasm you are experiencing.  We did a urine analysis to see if the bacterial infection that you are previously being treated for has resolved.  Based on the results it looks like there is still lingering infection.  We will give you a new prescription since he do not have access to-year-old 1.  It is important to keep the medication with you and finish the entire course to avoid antibiotic resistance or any bacteria spreading from your bladder up into your kidneys.  You should continue to take this medication until it is complete even if you start feeling better before it has been.  I recommend following up with a primary care doctor in the next couple of days to ensure you continue to feel better.  If you do not have a primary care there is a list of primary care below and you can call to make an appointment with them.      [facility] ED Clinic List    Healthcare Providers Services Day of Week/ Hours   [facility] Services  [address]  [phone] Pediatric Primary Care  Adult Primary Care  OB/GYN/Prenatal/Specialty Clinics Monday - Friday  8:00a - 4:30p   [address]  [phone] Adult Medicine, Pediatrics, OB/GYN Monday - Friday  8:30a - 4:30p   [facility] - Surgery  [address] [place] [address]  [phone]  Wednesday 8:30a - 11:00a   [facility]  [address] Adult Internal Medicine   ([facility])  [phone]  OB/GYN Clinic  [phone]    Pediatric Clinic  [phone]   Monday, Tuesday, Thursday, Friday  8:00a - 4:30p  Wednesday 1:00p - 4:30p  Monday, Tuesday, Thursday 8:00a - 5:00p;  Friday 8:00a - 12:30p  Wednesday 1p - 4p  Monday, Tuesday, Thursday, Friday  8:30a - 4:15p  Wednesday 12:30p - 4:15p   Health Department  [address]  ([phone])

## 2024-11-06 RX ORDER — LANOLIN ALCOHOL/MO/W.PET/CERES
400 CREAM (GRAM) TOPICAL NIGHTLY
Qty: 30 TABLET | Refills: 0 | Status: SHIPPED | OUTPATIENT
Start: 2024-11-06

## 2025-03-24 ENCOUNTER — APPOINTMENT (OUTPATIENT)
Dept: GENERAL RADIOLOGY | Age: 25
End: 2025-03-24
Payer: COMMERCIAL

## 2025-03-24 ENCOUNTER — HOSPITAL ENCOUNTER (EMERGENCY)
Age: 25
Discharge: HOME | End: 2025-03-24
Attending: EMERGENCY MEDICINE
Payer: COMMERCIAL

## 2025-03-24 VITALS
WEIGHT: 155 LBS | HEIGHT: 66 IN | HEART RATE: 92 BPM | BODY MASS INDEX: 24.91 KG/M2 | SYSTOLIC BLOOD PRESSURE: 128 MMHG | RESPIRATION RATE: 14 BRPM | DIASTOLIC BLOOD PRESSURE: 85 MMHG | OXYGEN SATURATION: 99 % | TEMPERATURE: 97.7 F

## 2025-03-24 DIAGNOSIS — R19.7 NAUSEA VOMITING AND DIARRHEA: Primary | ICD-10-CM

## 2025-03-24 DIAGNOSIS — R11.2 NAUSEA VOMITING AND DIARRHEA: Primary | ICD-10-CM

## 2025-03-24 LAB
ALBUMIN SERPL-MCNC: 4.6 G/DL (ref 3.5–5.2)
ALBUMIN/GLOB SERPL: 1.2 {RATIO} (ref 1–2.5)
ALP SERPL-CCNC: 99 U/L (ref 35–104)
ALT SERPL-CCNC: 21 U/L (ref 10–35)
AMORPH SED URNS QL MICRO: ABNORMAL
ANION GAP SERPL CALCULATED.3IONS-SCNC: 11 MMOL/L (ref 9–16)
AST SERPL-CCNC: 25 U/L (ref 10–35)
BACTERIA URNS QL MICRO: ABNORMAL
BASOPHILS # BLD: <0.03 K/UL (ref 0–0.2)
BASOPHILS NFR BLD: 0 % (ref 0–2)
BILIRUB SERPL-MCNC: 0.4 MG/DL (ref 0–1.2)
BILIRUB UR QL STRIP: NEGATIVE
BUN SERPL-MCNC: 13 MG/DL (ref 6–20)
CALCIUM SERPL-MCNC: 11.1 MG/DL (ref 8.6–10.4)
CHLORIDE SERPL-SCNC: 102 MMOL/L (ref 98–107)
CLARITY UR: ABNORMAL
CO2 SERPL-SCNC: 25 MMOL/L (ref 20–31)
COLOR UR: YELLOW
CREAT SERPL-MCNC: 0.5 MG/DL (ref 0.5–0.9)
EOSINOPHIL # BLD: 0.05 K/UL (ref 0–0.44)
EOSINOPHILS RELATIVE PERCENT: 1 % (ref 1–4)
EPI CELLS #/AREA URNS HPF: ABNORMAL /HPF (ref 0–5)
ERYTHROCYTE [DISTWIDTH] IN BLOOD BY AUTOMATED COUNT: 13 % (ref 11.8–14.4)
GFR, ESTIMATED: >90 ML/MIN/1.73M2
GLUCOSE SERPL-MCNC: 83 MG/DL (ref 74–99)
GLUCOSE UR STRIP-MCNC: NEGATIVE MG/DL
HCG UR QL: NEGATIVE
HCT VFR BLD AUTO: 44.2 % (ref 36.3–47.1)
HGB BLD-MCNC: 14.6 G/DL (ref 11.9–15.1)
HGB UR QL STRIP.AUTO: NEGATIVE
IMM GRANULOCYTES # BLD AUTO: 0.05 K/UL (ref 0–0.3)
IMM GRANULOCYTES NFR BLD: 1 %
KETONES UR STRIP-MCNC: NEGATIVE MG/DL
LEUKOCYTE ESTERASE UR QL STRIP: ABNORMAL
LIPASE SERPL-CCNC: 39 U/L (ref 13–60)
LYMPHOCYTES NFR BLD: 1.19 K/UL (ref 1.1–3.7)
LYMPHOCYTES RELATIVE PERCENT: 11 % (ref 24–43)
MAGNESIUM SERPL-MCNC: 1.9 MG/DL (ref 1.6–2.6)
MCH RBC QN AUTO: 27.2 PG (ref 25.2–33.5)
MCHC RBC AUTO-ENTMCNC: 33 G/DL (ref 28.4–34.8)
MCV RBC AUTO: 82.5 FL (ref 82.6–102.9)
MONOCYTES NFR BLD: 0.48 K/UL (ref 0.1–1.2)
MONOCYTES NFR BLD: 4 % (ref 3–12)
NEUTROPHILS NFR BLD: 83 % (ref 36–65)
NEUTS SEG NFR BLD: 9.24 K/UL (ref 1.5–8.1)
NITRITE UR QL STRIP: NEGATIVE
NRBC BLD-RTO: 0 PER 100 WBC
PH UR STRIP: 7 [PH] (ref 5–8)
PLATELET # BLD AUTO: 288 K/UL (ref 138–453)
PMV BLD AUTO: 9.7 FL (ref 8.1–13.5)
POTASSIUM SERPL-SCNC: 4.4 MMOL/L (ref 3.7–5.3)
PROT SERPL-MCNC: 8.5 G/DL (ref 6.6–8.7)
PROT UR STRIP-MCNC: ABNORMAL MG/DL
RBC # BLD AUTO: 5.36 M/UL (ref 3.95–5.11)
RBC # BLD: ABNORMAL 10*6/UL
RBC #/AREA URNS HPF: ABNORMAL /HPF (ref 0–2)
SODIUM SERPL-SCNC: 137 MMOL/L (ref 136–145)
SP GR UR STRIP: 1.02 (ref 1–1.03)
UROBILINOGEN UR STRIP-ACNC: NORMAL EU/DL (ref 0–1)
WBC #/AREA URNS HPF: ABNORMAL /HPF (ref 0–5)
WBC OTHER # BLD: 11 K/UL (ref 3.5–11.3)

## 2025-03-24 PROCEDURE — 83690 ASSAY OF LIPASE: CPT

## 2025-03-24 PROCEDURE — 99284 EMERGENCY DEPT VISIT MOD MDM: CPT

## 2025-03-24 PROCEDURE — 81001 URINALYSIS AUTO W/SCOPE: CPT

## 2025-03-24 PROCEDURE — 74022 RADEX COMPL AQT ABD SERIES: CPT

## 2025-03-24 PROCEDURE — 96375 TX/PRO/DX INJ NEW DRUG ADDON: CPT

## 2025-03-24 PROCEDURE — 6360000002 HC RX W HCPCS: Performed by: EMERGENCY MEDICINE

## 2025-03-24 PROCEDURE — 81025 URINE PREGNANCY TEST: CPT

## 2025-03-24 PROCEDURE — 85025 COMPLETE CBC W/AUTO DIFF WBC: CPT

## 2025-03-24 PROCEDURE — 2500000003 HC RX 250 WO HCPCS: Performed by: EMERGENCY MEDICINE

## 2025-03-24 PROCEDURE — 96361 HYDRATE IV INFUSION ADD-ON: CPT

## 2025-03-24 PROCEDURE — 80053 COMPREHEN METABOLIC PANEL: CPT

## 2025-03-24 PROCEDURE — 83735 ASSAY OF MAGNESIUM: CPT

## 2025-03-24 PROCEDURE — 2580000003 HC RX 258: Performed by: EMERGENCY MEDICINE

## 2025-03-24 PROCEDURE — 96374 THER/PROPH/DIAG INJ IV PUSH: CPT

## 2025-03-24 RX ORDER — ONDANSETRON 4 MG/1
4 TABLET, ORALLY DISINTEGRATING ORAL 3 TIMES DAILY PRN
Qty: 21 TABLET | Refills: 0 | Status: SHIPPED | OUTPATIENT
Start: 2025-03-24

## 2025-03-24 RX ORDER — ONDANSETRON 2 MG/ML
4 INJECTION INTRAMUSCULAR; INTRAVENOUS ONCE
Status: COMPLETED | OUTPATIENT
Start: 2025-03-24 | End: 2025-03-24

## 2025-03-24 RX ORDER — KETOROLAC TROMETHAMINE 15 MG/ML
15 INJECTION, SOLUTION INTRAMUSCULAR; INTRAVENOUS ONCE
Status: COMPLETED | OUTPATIENT
Start: 2025-03-24 | End: 2025-03-24

## 2025-03-24 RX ORDER — 0.9 % SODIUM CHLORIDE 0.9 %
1000 INTRAVENOUS SOLUTION INTRAVENOUS ONCE
Status: COMPLETED | OUTPATIENT
Start: 2025-03-24 | End: 2025-03-24

## 2025-03-24 RX ADMIN — ONDANSETRON 4 MG: 2 INJECTION, SOLUTION INTRAMUSCULAR; INTRAVENOUS at 11:51

## 2025-03-24 RX ADMIN — FAMOTIDINE 20 MG: 10 INJECTION, SOLUTION INTRAVENOUS at 11:50

## 2025-03-24 RX ADMIN — KETOROLAC TROMETHAMINE 15 MG: 15 INJECTION, SOLUTION INTRAMUSCULAR; INTRAVENOUS at 11:50

## 2025-03-24 RX ADMIN — SODIUM CHLORIDE 1000 ML: 0.9 INJECTION, SOLUTION INTRAVENOUS at 11:51

## 2025-03-24 ASSESSMENT — PAIN DESCRIPTION - PAIN TYPE: TYPE: ACUTE PAIN

## 2025-03-24 ASSESSMENT — PAIN - FUNCTIONAL ASSESSMENT: PAIN_FUNCTIONAL_ASSESSMENT: 0-10

## 2025-03-24 ASSESSMENT — PAIN DESCRIPTION - LOCATION: LOCATION: HEAD

## 2025-03-24 ASSESSMENT — PAIN DESCRIPTION - FREQUENCY: FREQUENCY: CONTINUOUS

## 2025-03-24 ASSESSMENT — PAIN SCALES - GENERAL: PAINLEVEL_OUTOF10: 8

## 2025-03-24 ASSESSMENT — PAIN DESCRIPTION - DESCRIPTORS: DESCRIPTORS: ACHING

## 2025-03-24 NOTE — ED NOTES
Pt demanded writer attempt IV in LAC, #20 g inserted without difficulty and blood return noted, however vein blew when flushed with NSS. Writer attempting to look for other sites, prep for LFA for insertion when pt yelled \"you can't go there I'm not letting you do it there, you need to go where I said\". Writer explained this site looked promising and area in AC already swollen at this time. Pt continues to yell that writer cannot insert IV anywhere else. Pt continuing to tell, writer exited room at this time. Dr. Lundberg made aware.    Initial (On Arrival)

## 2025-03-24 NOTE — ED PROVIDER NOTES
[facility] EMERGENCY DEPARTMENT  eMERGENCY dEPARTMENT eNCOUnter    Pt Name: Marie Golden  MRN: [id number]  Birthdate 2000  Date of evaluation: 3/24/25  CHIEF COMPLAINT       Chief Complaint   Patient presents with    Vomiting    Nausea    Headache     STARTED THIS MORNING     HISTORY OF PRESENT ILLNESS   HPI  Patient is a 24-year-old female presents emergency room complaints of nausea vomiting diarrhea that all started this morning.  Patient admits to chills abdominal pain and headache.  Patient denies any chest pain.  Patient denies any new shortness of breath.  REVIEW OF SYSTEMS     Constitutional: No fever, positive  Eye: No visual changes  Ear/Nose/Mouth/Throat: No sore throat  Respiratory: No shortness of breath  Cardiovascular: No chest pain  Gastrointestinal: As above  Genitourinary: No dysuria  Musculoskeletal: No joint pain  Integumentary: No rash  Neurologic: No dizziness, positive headache  Psychiatric: No anxiety, no depression  All systems otherwise negative.        PAST MEDICAL HISTORY     Past Medical History:   Diagnosis Date    Anxiety     ASCUS w/ High Risk HPV 23    Autism     Bipolar 1 disorder (HCC)     Chlamydia 2024    cHTN 2023    Deep vein thrombosis (HCC)     Depression     Depression with suicidal ideation 2023    Heart defect     Heart murmur     Migraine     PTSD (post-traumatic stress disorder)     Thyroid disease     pt unsure hyper or hypo     SURGICAL HISTORY       Past Surgical History:   Procedure Laterality Date     SECTION       CURRENT MEDICATIONS       Previous Medications    ACETAMINOPHEN (TYLENOL) 500 MG TABLET    Take 2 tablets by mouth every 6 hours as needed for Pain    ALBUTEROL SULFATE HFA (PROVENTIL;VENTOLIN;PROAIR) 108 (90 BASE) MCG/ACT INHALER    INHALE 2 PUFFS BY MOUTH EVERY 6 HOURS AS NEEDED FOR WHEEZING FOR SHORTNESS OF BREATH    AMITRIPTYLINE (ELAVIL) 10 MG TABLET    Take 1 tablet by mouth nightly    APIXABAN

## 2025-03-27 ENCOUNTER — CLINICAL SUPPORT (OUTPATIENT)
Dept: OBGYN | Age: 25
End: 2025-03-27
Payer: COMMERCIAL

## 2025-03-27 VITALS — BODY MASS INDEX: 24.11 KG/M2 | WEIGHT: 149.4 LBS

## 2025-03-27 DIAGNOSIS — Z32.00 ENCOUNTER FOR PREGNANCY TEST, RESULT UNKNOWN: Primary | ICD-10-CM

## 2025-03-27 LAB
CONTROL: PRESENT
PREGNANCY TEST URINE, POC: NEGATIVE

## 2025-03-27 PROCEDURE — 81025 URINE PREGNANCY TEST: CPT | Performed by: OBSTETRICS & GYNECOLOGY

## 2025-03-27 NOTE — PROGRESS NOTES
Patient presents for UPT, LMP unknown, \"sometime in February.\" UPT results negative; results given to patient.    Patient was scheduled for repeat UPT in two weeks.

## 2025-04-03 ENCOUNTER — HOSPITAL ENCOUNTER (EMERGENCY)
Age: 25
Discharge: LEFT BEFORE DISCHARGE | End: 2025-04-03
Attending: STUDENT IN AN ORGANIZED HEALTH CARE EDUCATION/TRAINING PROGRAM
Payer: COMMERCIAL

## 2025-04-03 ENCOUNTER — APPOINTMENT (OUTPATIENT)
Dept: GENERAL RADIOLOGY | Age: 25
End: 2025-04-03
Payer: COMMERCIAL

## 2025-04-03 VITALS
RESPIRATION RATE: 18 BRPM | SYSTOLIC BLOOD PRESSURE: 134 MMHG | DIASTOLIC BLOOD PRESSURE: 86 MMHG | HEART RATE: 124 BPM | OXYGEN SATURATION: 100 % | TEMPERATURE: 98.3 F
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
## 2025-04-03 PROCEDURE — 99281 EMR DPT VST MAYX REQ PHY/QHP: CPT

## 2025-04-03 PROCEDURE — 93005 ELECTROCARDIOGRAM TRACING: CPT | Performed by: STUDENT IN AN ORGANIZED HEALTH CARE EDUCATION/TRAINING PROGRAM

## 2025-04-03 RX ORDER — ACETAMINOPHEN 500 MG
1000 TABLET ORAL ONCE
Status: DISCONTINUED | OUTPATIENT
Start: 2025-04-03 | End: 2025-04-03 | Stop reason: HOSPADM

## 2025-04-03 ASSESSMENT — PAIN - FUNCTIONAL ASSESSMENT: PAIN_FUNCTIONAL_ASSESSMENT: 0-10

## 2025-04-03 ASSESSMENT — LIFESTYLE VARIABLES
HOW OFTEN DO YOU HAVE A DRINK CONTAINING ALCOHOL: PATIENT DECLINED
HOW MANY STANDARD DRINKS CONTAINING ALCOHOL DO YOU HAVE ON A TYPICAL DAY: PATIENT DECLINED

## 2025-04-03 ASSESSMENT — PAIN SCALES - GENERAL: PAINLEVEL_OUTOF10: 10

## 2025-04-03 NOTE — ED NOTES
Pt refusing IV  Has emotional support dog barking at staff   PT states she wants to lave yulia  Physician notified
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
## 2025-04-05 LAB
EKG ATRIAL RATE: 122 BPM
EKG P AXIS: 70 DEGREES
EKG P-R INTERVAL: 196 MS
EKG Q-T INTERVAL: 254 MS
EKG QRS DURATION: 84 MS
EKG QTC CALCULATION (BAZETT): 361 MS
EKG R AXIS: 74 DEGREES
EKG T AXIS: -77 DEGREES
EKG VENTRICULAR RATE: 122 BPM

## 2025-04-05 PROCEDURE — 93010 ELECTROCARDIOGRAM REPORT: CPT | Performed by: INTERNAL MEDICINE

## 2025-05-04 DIAGNOSIS — G44.201 ACUTE INTRACTABLE TENSION-TYPE HEADACHE: ICD-10-CM

## 2025-05-06 DIAGNOSIS — G44.201 ACUTE INTRACTABLE TENSION-TYPE HEADACHE: ICD-10-CM

## 2025-05-06 NOTE — TELEPHONE ENCOUNTER
Pt is requesting refill of:     Medication: Imitrex 50 mg     Active on Med List:      Last Office Visit: 24    Next Office Visit: 25    Please refill is request is acceptable. Thank you

## 2025-05-12 RX ORDER — SUMATRIPTAN 50 MG/1
50 TABLET, FILM COATED ORAL
Qty: 9 TABLET | Refills: 2 | Status: SHIPPED | OUTPATIENT
Start: 2025-05-12

## 2025-05-12 RX ORDER — PSEUDOEPHED/ACETAMINOPH/DIPHEN 30MG-500MG
1 TABLET ORAL 2 TIMES DAILY PRN
Refills: 0 | OUTPATIENT
Start: 2025-05-12

## 2025-05-12 RX ORDER — SUMATRIPTAN 50 MG/1
50 TABLET, FILM COATED ORAL
Qty: 9 TABLET | Refills: 1 | Status: SHIPPED | OUTPATIENT
Start: 2025-05-12

## 2025-07-30 ENCOUNTER — APPOINTMENT (OUTPATIENT)
Dept: GENERAL RADIOLOGY | Age: 25
End: 2025-07-30
Payer: COMMERCIAL

## 2025-07-30 ENCOUNTER — HOSPITAL ENCOUNTER (EMERGENCY)
Age: 25
Discharge: HOME | End: 2025-07-30
Attending: EMERGENCY MEDICINE
Payer: COMMERCIAL

## 2025-07-30 VITALS
WEIGHT: 158 LBS | RESPIRATION RATE: 16 BRPM | HEART RATE: 96 BPM | BODY MASS INDEX: 25.39 KG/M2 | TEMPERATURE: 98.4 F | OXYGEN SATURATION: 98 % | HEIGHT: 66 IN | SYSTOLIC BLOOD PRESSURE: 115 MMHG | DIASTOLIC BLOOD PRESSURE: 71 MMHG

## 2025-07-30 DIAGNOSIS — M79.671 RIGHT FOOT PAIN: Primary | ICD-10-CM

## 2025-07-30 PROCEDURE — 99283 EMERGENCY DEPT VISIT LOW MDM: CPT

## 2025-07-30 PROCEDURE — 73630 X-RAY EXAM OF FOOT: CPT

## 2025-07-30 PROCEDURE — 73610 X-RAY EXAM OF ANKLE: CPT

## 2025-07-30 ASSESSMENT — ENCOUNTER SYMPTOMS
SHORTNESS OF BREATH: 0
COLOR CHANGE: 0
BACK PAIN: 0

## 2025-07-30 ASSESSMENT — PAIN DESCRIPTION - LOCATION: LOCATION: ANKLE

## 2025-07-30 ASSESSMENT — PAIN DESCRIPTION - ORIENTATION: ORIENTATION: RIGHT

## 2025-07-30 ASSESSMENT — PAIN - FUNCTIONAL ASSESSMENT: PAIN_FUNCTIONAL_ASSESSMENT: 0-10

## 2025-07-30 ASSESSMENT — PAIN SCALES - GENERAL: PAINLEVEL_OUTOF10: 10

## 2025-07-31 NOTE — ED PROVIDER NOTES
[facility] EMERGENCY DEPARTMENT  eMERGENCY dEPARTMENT eNCOUnter      Pt Name: Marie Golden  MRN: [id number]  Birthdate 2000  Date of evaluation: 7/30/2025  Provider: Chico Corrales PA-C    CHIEF COMPLAINT       Chief Complaint   Patient presents with    Ankle Pain     C/o right ankle pain starting today, denies injury except dog jumping on it after the pain initially started         HISTORY OF PRESENT ILLNESS  (Location/Symptom, Timing/Onset, Context/Setting, Quality, Duration, Modifying Factors, Severity.)   Marie Golden is a 24 y.o. female who presents to the emergency department with right foot pain.  Patient states her pain started earlier today.  No known injury.  She rates her pain 10 out of 10.  She has not taken any medications at home for the pain.  She denies numbness, tingling, weakness, decreased range of motion, inability to bear weight.    Nursing Notes were reviewed.    ALLERGIES     Latex, Penicillins, and Vyvanse [lisdexamfetamine]    CURRENT MEDICATIONS       Previous Medications    ACETAMINOPHEN (TYLENOL) 500 MG TABLET    Take 2 tablets by mouth every 6 hours as needed for Pain    ALBUTEROL SULFATE HFA (PROVENTIL;VENTOLIN;PROAIR) 108 (90 BASE) MCG/ACT INHALER    INHALE 2 PUFFS BY MOUTH EVERY 6 HOURS AS NEEDED FOR WHEEZING FOR SHORTNESS OF BREATH    AMITRIPTYLINE (ELAVIL) 10 MG TABLET    Take 1 tablet by mouth nightly    APIXABAN (ELIQUIS) 2.5 MG TABS TABLET    Take 1 tablet by mouth 2 times daily    ARIPIPRAZOLE ER (ABILIFY MAINTENA) 300 MG PRSY PREFILLED SYRINGE    Inject 1 Syringe into the muscle every 28 days    ASPIRIN 81 MG EC TABLET    Take 1 tablet by mouth daily    CYCLOBENZAPRINE (FLEXERIL) 5 MG TABLET    Take 1 tablet by mouth 2 times daily as needed for Muscle spasms    DIPHENHYDRAMINE (BENADRYL) 25 MG CAPSULE    Take 1 capsule by mouth nightly as needed    DIPHENHYDRAMINE (BENADRYL) 25 MG CAPSULE    Take 1 capsule by mouth every 4 hours as needed for Itching

## 2025-07-31 NOTE — ED NOTES
Pt arrived to ed with c/o right ankle pain that started after she was jumped on by a dog. No deformity or swelling noted. Pt ambulates with limp. Respirations non labored. Pt A&Ox4.

## 2025-07-31 NOTE — DISCHARGE INSTRUCTIONS
Call [phone] ([phone]) to establish care for follow up. You can also call [facility] at [phone] to establish care.    Apply a compressive ACE bandage. Rest and elevate the affected painful area.  Apply cold compresses intermittently as needed.  As pain recedes, begin normal activities slowly as tolerated.      Recommended patient follow up with PCP for further evaluation and management. Return to ED if patient develops worsening pain, numbness, tingling, weakness, inability to bear weight, decreased range of motion.

## 2025-08-05 ENCOUNTER — OFFICE VISIT (OUTPATIENT)
Dept: NEUROLOGY | Age: 25
End: 2025-08-05
Payer: MEDICAID

## 2025-08-05 VITALS
SYSTOLIC BLOOD PRESSURE: 118 MMHG | HEART RATE: 100 BPM | HEIGHT: 66 IN | WEIGHT: 155.4 LBS | BODY MASS INDEX: 24.98 KG/M2 | DIASTOLIC BLOOD PRESSURE: 75 MMHG

## 2025-08-05 DIAGNOSIS — G44.219 EPISODIC TENSION-TYPE HEADACHE, NOT INTRACTABLE: ICD-10-CM

## 2025-08-05 DIAGNOSIS — G43.119 INTRACTABLE MIGRAINE WITH AURA WITHOUT STATUS MIGRAINOSUS: Primary | ICD-10-CM

## 2025-08-05 PROCEDURE — 99214 OFFICE O/P EST MOD 30 MIN: CPT

## 2025-08-05 RX ORDER — ESCITALOPRAM OXALATE 10 MG/1
10 TABLET ORAL DAILY
COMMUNITY
Start: 2025-03-27

## 2025-08-05 RX ORDER — SUMATRIPTAN SUCCINATE 100 MG/1
50 TABLET ORAL
Qty: 9 TABLET | Refills: 3 | Status: SHIPPED | OUTPATIENT
Start: 2025-08-05

## 2025-08-05 RX ORDER — MELOXICAM 15 MG/1
TABLET ORAL
COMMUNITY
Start: 2025-08-04

## 2025-08-05 ASSESSMENT — ENCOUNTER SYMPTOMS
GASTROINTESTINAL NEGATIVE: 1
RESPIRATORY NEGATIVE: 1

## 2025-08-12 ENCOUNTER — HOSPITAL ENCOUNTER (EMERGENCY)
Age: 25
Discharge: HOME | End: 2025-08-12
Attending: EMERGENCY MEDICINE
Payer: MEDICAID

## 2025-08-12 VITALS
RESPIRATION RATE: 16 BRPM | HEART RATE: 83 BPM | WEIGHT: 155 LBS | SYSTOLIC BLOOD PRESSURE: 121 MMHG | HEIGHT: 66 IN | TEMPERATURE: 98.1 F | DIASTOLIC BLOOD PRESSURE: 76 MMHG | BODY MASS INDEX: 24.91 KG/M2 | OXYGEN SATURATION: 98 %

## 2025-08-12 DIAGNOSIS — Z79.899 MEDICATION MANAGEMENT: Primary | ICD-10-CM

## 2025-08-12 DIAGNOSIS — G47.00 INSOMNIA, UNSPECIFIED TYPE: ICD-10-CM

## 2025-08-12 PROCEDURE — 99282 EMERGENCY DEPT VISIT SF MDM: CPT
